# Patient Record
Sex: FEMALE | Race: WHITE | NOT HISPANIC OR LATINO | Employment: OTHER | URBAN - METROPOLITAN AREA
[De-identification: names, ages, dates, MRNs, and addresses within clinical notes are randomized per-mention and may not be internally consistent; named-entity substitution may affect disease eponyms.]

---

## 2017-01-13 RX ORDER — ATORVASTATIN CALCIUM 10 MG/1
TABLET, FILM COATED ORAL
Qty: 90 TABLET | Refills: 1 | Status: SHIPPED | OUTPATIENT
Start: 2017-01-13 | End: 2017-07-08 | Stop reason: SDUPTHER

## 2017-05-17 DIAGNOSIS — E03.9 ACQUIRED HYPOTHYROIDISM: ICD-10-CM

## 2017-05-17 RX ORDER — LEVOTHYROXINE SODIUM 0.07 MG/1
TABLET ORAL
Qty: 90 TABLET | Refills: 3 | Status: SHIPPED | OUTPATIENT
Start: 2017-05-17

## 2017-06-06 RX ORDER — ATORVASTATIN CALCIUM 10 MG/1
TABLET, FILM COATED ORAL
Qty: 90 TABLET | Refills: 1 | OUTPATIENT
Start: 2017-06-06

## 2017-06-27 ENCOUNTER — OFFICE VISIT (OUTPATIENT)
Dept: INTERNAL MEDICINE | Facility: CLINIC | Age: 77
End: 2017-06-27

## 2017-06-27 VITALS
RESPIRATION RATE: 16 BRPM | HEART RATE: 92 BPM | WEIGHT: 114 LBS | SYSTOLIC BLOOD PRESSURE: 140 MMHG | HEIGHT: 62 IN | BODY MASS INDEX: 20.98 KG/M2 | TEMPERATURE: 98.1 F | DIASTOLIC BLOOD PRESSURE: 72 MMHG

## 2017-06-27 DIAGNOSIS — I10 ESSENTIAL HYPERTENSION: ICD-10-CM

## 2017-06-27 DIAGNOSIS — Z79.899 DRUG THERAPY: ICD-10-CM

## 2017-06-27 DIAGNOSIS — K21.9 GASTROESOPHAGEAL REFLUX DISEASE WITHOUT ESOPHAGITIS: Primary | ICD-10-CM

## 2017-06-27 DIAGNOSIS — M81.0 OSTEOPOROSIS: ICD-10-CM

## 2017-06-27 DIAGNOSIS — E03.9 ACQUIRED HYPOTHYROIDISM: ICD-10-CM

## 2017-06-27 DIAGNOSIS — E78.2 MIXED HYPERLIPIDEMIA: ICD-10-CM

## 2017-06-27 LAB
ALBUMIN SERPL-MCNC: 4.5 G/DL (ref 3.2–4.8)
ALBUMIN/GLOB SERPL: 1.8 G/DL (ref 1.5–2.5)
ALP SERPL-CCNC: 83 U/L (ref 25–100)
ALT SERPL W P-5'-P-CCNC: 19 U/L (ref 7–40)
ANION GAP SERPL CALCULATED.3IONS-SCNC: 16 MMOL/L (ref 3–11)
ARTICHOKE IGE QN: 95 MG/DL (ref 0–130)
AST SERPL-CCNC: 28 U/L (ref 0–33)
BILIRUB SERPL-MCNC: 0.4 MG/DL (ref 0.3–1.2)
BUN BLD-MCNC: 13 MG/DL (ref 9–23)
BUN/CREAT SERPL: 16.3 (ref 7–25)
CALCIUM SPEC-SCNC: 10.3 MG/DL (ref 8.7–10.4)
CHLORIDE SERPL-SCNC: 107 MMOL/L (ref 99–109)
CHOLEST SERPL-MCNC: 179 MG/DL (ref 0–200)
CO2 SERPL-SCNC: 19 MMOL/L (ref 20–31)
CREAT BLD-MCNC: 0.8 MG/DL (ref 0.6–1.3)
DEPRECATED RDW RBC AUTO: 47 FL (ref 37–54)
ERYTHROCYTE [DISTWIDTH] IN BLOOD BY AUTOMATED COUNT: 13 % (ref 11.3–14.5)
GFR SERPL CREATININE-BSD FRML MDRD: 70 ML/MIN/1.73
GLOBULIN UR ELPH-MCNC: 2.5 GM/DL
GLUCOSE BLD-MCNC: 66 MG/DL (ref 70–100)
HCT VFR BLD AUTO: 39.1 % (ref 34.5–44)
HDLC SERPL-MCNC: 58 MG/DL (ref 40–60)
HGB BLD-MCNC: 12.9 G/DL (ref 11.5–15.5)
MCH RBC QN AUTO: 33.1 PG (ref 27–31)
MCHC RBC AUTO-ENTMCNC: 33 G/DL (ref 32–36)
MCV RBC AUTO: 100.3 FL (ref 80–99)
PLATELET # BLD AUTO: 299 10*3/MM3 (ref 150–450)
PMV BLD AUTO: 12.3 FL (ref 6–12)
POTASSIUM BLD-SCNC: 4.6 MMOL/L (ref 3.5–5.5)
PROT SERPL-MCNC: 7 G/DL (ref 5.7–8.2)
RBC # BLD AUTO: 3.9 10*6/MM3 (ref 3.89–5.14)
SODIUM BLD-SCNC: 142 MMOL/L (ref 132–146)
T4 FREE SERPL-MCNC: 1.43 NG/DL (ref 0.89–1.76)
TRIGL SERPL-MCNC: 113 MG/DL (ref 0–150)
TSH SERPL DL<=0.05 MIU/L-ACNC: 0.19 MIU/ML (ref 0.35–5.35)
WBC NRBC COR # BLD: 8.19 10*3/MM3 (ref 3.5–10.8)

## 2017-06-27 PROCEDURE — 80061 LIPID PANEL: CPT | Performed by: INTERNAL MEDICINE

## 2017-06-27 PROCEDURE — 36415 COLL VENOUS BLD VENIPUNCTURE: CPT | Performed by: INTERNAL MEDICINE

## 2017-06-27 PROCEDURE — 80053 COMPREHEN METABOLIC PANEL: CPT | Performed by: INTERNAL MEDICINE

## 2017-06-27 PROCEDURE — 99214 OFFICE O/P EST MOD 30 MIN: CPT | Performed by: INTERNAL MEDICINE

## 2017-06-27 PROCEDURE — 85027 COMPLETE CBC AUTOMATED: CPT | Performed by: INTERNAL MEDICINE

## 2017-06-27 PROCEDURE — 84443 ASSAY THYROID STIM HORMONE: CPT | Performed by: INTERNAL MEDICINE

## 2017-06-27 PROCEDURE — 84439 ASSAY OF FREE THYROXINE: CPT | Performed by: INTERNAL MEDICINE

## 2017-06-27 NOTE — PROGRESS NOTES
Subjective   Aura Fischer is a 77 y.o. female.     History of Present Illness   For follow up of  HTN on meds.  She has some sinus headaches but are about the same.  Hypothyroid on replacement.  Hyperlipidemia on meds no muscle aches.  Osteoporosis on meds.  She has been taking for 2 years, has had a bone density last year via GYN.      The following portions of the patient's history were reviewed and updated as appropriate: allergies, current medications, past family history, past medical history, past social history, past surgical history and problem list.    Review of Systems   Constitutional: Negative.  Negative for appetite change, fatigue and fever.   HENT: Positive for ear pain (mild left canal pain for several days.). Negative for congestion, hearing loss, rhinorrhea, sinus pressure, sore throat, tinnitus and trouble swallowing.    Eyes: Negative.  Negative for visual disturbance.   Respiratory: Negative.  Negative for cough, chest tightness, shortness of breath and wheezing.    Cardiovascular: Negative.  Negative for chest pain, palpitations and leg swelling.   Gastrointestinal: Negative.  Negative for abdominal distention, abdominal pain, blood in stool, constipation, diarrhea, nausea and vomiting.   Endocrine: Negative.  Negative for polydipsia, polyphagia and polyuria.   Genitourinary: Negative.  Negative for difficulty urinating, dysuria, flank pain, frequency, menstrual problem, pelvic pain, vaginal bleeding and vaginal discharge.   Musculoskeletal: Negative.  Negative for arthralgias, back pain, gait problem, joint swelling and neck pain.   Skin: Negative for rash.   Allergic/Immunologic: Negative.  Negative for environmental allergies.   Neurological: Positive for headaches. Negative for dizziness, tremors, seizures, weakness and numbness.   Hematological: Negative.  Negative for adenopathy.   Psychiatric/Behavioral: Negative.  Negative for agitation, confusion, dysphoric mood and sleep  disturbance. The patient is not nervous/anxious.        Objective   Physical Exam   Constitutional: She is oriented to person, place, and time. She appears well-developed and well-nourished.   HENT:   Head: Normocephalic and atraumatic.   Right Ear: External ear normal.   Left Ear: External ear normal.   Nose: Nose normal.   Mouth/Throat: Oropharynx is clear and moist.   Ear looks normal on left maybe some slight flakiness in canal.   Eyes: Conjunctivae and EOM are normal. Pupils are equal, round, and reactive to light.   Fundoscopic exam:       The right eye shows no AV nicking and no hemorrhage.        The left eye shows no AV nicking and no hemorrhage.   Neck: Normal range of motion. Neck supple. No thyromegaly present.   Cardiovascular: Normal rate, regular rhythm, normal heart sounds and intact distal pulses.  Exam reveals no gallop and no friction rub.    No murmur heard.  Carotids normal.   Pulmonary/Chest: Effort normal and breath sounds normal. No respiratory distress. She has no wheezes. She has no rales. She exhibits no tenderness. Right breast exhibits no inverted nipple, no mass, no skin change and no tenderness. Left breast exhibits no inverted nipple, no mass, no skin change and no tenderness. Breasts are symmetrical.   Abdominal: Soft. Bowel sounds are normal. She exhibits no distension and no mass. There is no tenderness.   Musculoskeletal: Normal range of motion.   Lymphadenopathy:     She has no cervical adenopathy.   Neurological: She is alert and oriented to person, place, and time. She has normal reflexes. No cranial nerve deficit.   Skin: Skin is warm and dry.   Psychiatric: She has a normal mood and affect. Her behavior is normal. Judgment and thought content normal.   Nursing note and vitals reviewed.      Assessment/Plan   Aura was seen today for follow-up.    Diagnoses and all orders for this visit:    Gastroesophageal reflux disease without esophagitis    Acquired hypothyroidism  -      T4, Free  -     TSH    Osteoporosis    Mixed hyperlipidemia  -     Comprehensive Metabolic Panel  -     Lipid Panel    Essential hypertension    Drug therapy  -     CBC (No Diff)    Will try otc cortaid on q tip for ear.  All problems are stable  Labs as noted.  Same meds.  Recheck 6 months.

## 2017-07-10 RX ORDER — TRANDOLAPRIL TABLETS 2 MG/1
TABLET ORAL
Qty: 90 TABLET | Refills: 5 | Status: SHIPPED | OUTPATIENT
Start: 2017-07-10 | End: 2019-11-19 | Stop reason: HOSPADM

## 2017-07-10 RX ORDER — ATORVASTATIN CALCIUM 10 MG/1
TABLET, FILM COATED ORAL
Qty: 90 TABLET | Refills: 1 | Status: SHIPPED | OUTPATIENT
Start: 2017-07-10 | End: 2019-11-19 | Stop reason: HOSPADM

## 2019-11-13 ENCOUNTER — HOSPITAL ENCOUNTER (INPATIENT)
Facility: HOSPITAL | Age: 79
LOS: 6 days | Discharge: NURSING, REHAB OR LONG-TERM CARE | End: 2019-11-19
Attending: FAMILY MEDICINE | Admitting: INTERNAL MEDICINE

## 2019-11-13 ENCOUNTER — APPOINTMENT (OUTPATIENT)
Dept: GENERAL RADIOLOGY | Facility: HOSPITAL | Age: 79
End: 2019-11-13

## 2019-11-13 ENCOUNTER — APPOINTMENT (OUTPATIENT)
Dept: CT IMAGING | Facility: HOSPITAL | Age: 79
End: 2019-11-13

## 2019-11-13 ENCOUNTER — APPOINTMENT (OUTPATIENT)
Dept: MRI IMAGING | Facility: HOSPITAL | Age: 79
End: 2019-11-13

## 2019-11-13 ENCOUNTER — APPOINTMENT (OUTPATIENT)
Dept: NEUROLOGY | Facility: HOSPITAL | Age: 79
End: 2019-11-13

## 2019-11-13 ENCOUNTER — APPOINTMENT (OUTPATIENT)
Dept: CARDIOLOGY | Facility: HOSPITAL | Age: 79
End: 2019-11-13

## 2019-11-13 DIAGNOSIS — R41.841 COGNITIVE COMMUNICATION DEFICIT: ICD-10-CM

## 2019-11-13 DIAGNOSIS — Z74.09 IMPAIRED MOBILITY AND ADLS: ICD-10-CM

## 2019-11-13 DIAGNOSIS — G93.41 ACUTE METABOLIC ENCEPHALOPATHY: Primary | ICD-10-CM

## 2019-11-13 DIAGNOSIS — Z78.9 IMPAIRED MOBILITY AND ADLS: ICD-10-CM

## 2019-11-13 PROBLEM — D84.9 IMMUNOSUPPRESSED STATUS (HCC): Status: ACTIVE | Noted: 2019-11-13

## 2019-11-13 PROBLEM — N39.0 ACUTE UTI (URINARY TRACT INFECTION): Status: ACTIVE | Noted: 2019-11-13

## 2019-11-13 PROBLEM — G93.40 ENCEPHALOPATHY: Status: ACTIVE | Noted: 2019-11-13

## 2019-11-13 PROBLEM — A41.9 SEPSIS, UNSPECIFIED ORGANISM (HCC): Status: ACTIVE | Noted: 2019-11-13

## 2019-11-13 PROBLEM — E87.20 LACTIC ACIDOSIS: Status: ACTIVE | Noted: 2019-11-13

## 2019-11-13 LAB
ALBUMIN SERPL-MCNC: 4.2 G/DL (ref 3.5–5.2)
ALBUMIN/GLOB SERPL: 1.4 G/DL
ALP SERPL-CCNC: 106 U/L (ref 39–117)
ALT SERPL W P-5'-P-CCNC: 31 U/L (ref 1–33)
ANION GAP SERPL CALCULATED.3IONS-SCNC: 16 MMOL/L (ref 5–15)
APPEARANCE CSF: CLEAR
AST SERPL-CCNC: 29 U/L (ref 1–32)
BACTERIA UR QL AUTO: ABNORMAL /HPF
BH CV ECHO MEAS - AO ROOT AREA (BSA CORRECTED): 1.6
BH CV ECHO MEAS - AO ROOT AREA: 4.8 CM^2
BH CV ECHO MEAS - AO ROOT DIAM: 2.5 CM
BH CV ECHO MEAS - BSA(HAYCOCK): 1.6 M^2
BH CV ECHO MEAS - BSA: 1.6 M^2
BH CV ECHO MEAS - BZI_BMI: 23 KILOGRAMS/M^2
BH CV ECHO MEAS - BZI_METRIC_HEIGHT: 157.5 CM
BH CV ECHO MEAS - BZI_METRIC_WEIGHT: 57.2 KG
BH CV ECHO MEAS - EDV(CUBED): 86.6 ML
BH CV ECHO MEAS - EDV(TEICH): 88.8 ML
BH CV ECHO MEAS - EF(CUBED): 80.6 %
BH CV ECHO MEAS - EF(TEICH): 73.3 %
BH CV ECHO MEAS - ESV(CUBED): 16.8 ML
BH CV ECHO MEAS - ESV(TEICH): 23.7 ML
BH CV ECHO MEAS - FS: 42.1 %
BH CV ECHO MEAS - IVS/LVPW: 0.94
BH CV ECHO MEAS - IVSD: 0.91 CM
BH CV ECHO MEAS - LA DIMENSION: 4 CM
BH CV ECHO MEAS - LA/AO: 1.6
BH CV ECHO MEAS - LAD MAJOR: 5.1 CM
BH CV ECHO MEAS - LAT PEAK E' VEL: 6 CM/SEC
BH CV ECHO MEAS - LATERAL E/E' RATIO: 16.9
BH CV ECHO MEAS - LV MASS(C)D: 136.6 GRAMS
BH CV ECHO MEAS - LV MASS(C)DI: 86.9 GRAMS/M^2
BH CV ECHO MEAS - LVIDD: 4.4 CM
BH CV ECHO MEAS - LVIDS: 2.6 CM
BH CV ECHO MEAS - LVPWD: 0.97 CM
BH CV ECHO MEAS - MED PEAK E' VEL: 5.3 CM/SEC
BH CV ECHO MEAS - MEDIAL E/E' RATIO: 19.4
BH CV ECHO MEAS - MV A MAX VEL: 124.1 CM/SEC
BH CV ECHO MEAS - MV DEC SLOPE: 325 CM/SEC^2
BH CV ECHO MEAS - MV DEC TIME: 0.22 SEC
BH CV ECHO MEAS - MV E MAX VEL: 104.9 CM/SEC
BH CV ECHO MEAS - MV E/A: 0.85
BH CV ECHO MEAS - MV P1/2T MAX VEL: 84.2 CM/SEC
BH CV ECHO MEAS - MV P1/2T: 75.8 MSEC
BH CV ECHO MEAS - MVA P1/2T LCG: 2.6 CM^2
BH CV ECHO MEAS - MVA(P1/2T): 2.9 CM^2
BH CV ECHO MEAS - PA ACC SLOPE: 905.5 CM/SEC^2
BH CV ECHO MEAS - PA ACC TIME: 0.13 SEC
BH CV ECHO MEAS - PA PR(ACCEL): 20.4 MMHG
BH CV ECHO MEAS - RAP SYSTOLE: 3 MMHG
BH CV ECHO MEAS - RV MAX PG: 5.7 MMHG
BH CV ECHO MEAS - RV V1 MAX: 119.4 CM/SEC
BH CV ECHO MEAS - RVSP: 27 MMHG
BH CV ECHO MEAS - SI(CUBED): 44.4 ML/M^2
BH CV ECHO MEAS - SI(TEICH): 41.4 ML/M^2
BH CV ECHO MEAS - SV(CUBED): 69.8 ML
BH CV ECHO MEAS - SV(TEICH): 65.1 ML
BH CV ECHO MEAS - TAPSE (>1.6): 1.6 CM2
BH CV ECHO MEAS - TR MAX PG: 24 MMHG
BH CV ECHO MEAS - TR MAX VEL: 244.3 CM/SEC
BH CV ECHO MEASUREMENTS AVERAGE E/E' RATIO: 18.57
BH CV XLRA - RV BASE: 3.4 CM
BH CV XLRA - RV LENGTH: 5.7 CM
BH CV XLRA - RV MID: 2.9 CM
BH CV XLRA - TDI S': 13 CM/SEC
BILIRUB SERPL-MCNC: 0.7 MG/DL (ref 0.2–1.2)
BILIRUB UR QL STRIP: NEGATIVE
BUN BLD-MCNC: 15 MG/DL (ref 8–23)
BUN/CREAT SERPL: 19 (ref 7–25)
C GATTII+NEOFOR DNA CSF QL NAA+NON-PROBE: NOT DETECTED
CALCIUM SPEC-SCNC: 8.7 MG/DL (ref 8.6–10.5)
CHLORIDE SERPL-SCNC: 99 MMOL/L (ref 98–107)
CHOLEST SERPL-MCNC: 139 MG/DL (ref 0–200)
CLARITY UR: ABNORMAL
CMV DNA CSF QL NAA+PROBE: NOT DETECTED
CO2 SERPL-SCNC: 22 MMOL/L (ref 22–29)
COLOR CSF: COLORLESS
COLOR SPUN CSF: COLORLESS
COLOR UR: ABNORMAL
CREAT BLD-MCNC: 0.79 MG/DL (ref 0.57–1)
D-LACTATE SERPL-SCNC: 1.7 MMOL/L (ref 0.5–2)
DEPRECATED RDW RBC AUTO: 47.3 FL (ref 37–54)
E COLI K1 DNA CSF QL NAA+NON-PROBE: NOT DETECTED
ERYTHROCYTE [DISTWIDTH] IN BLOOD BY AUTOMATED COUNT: 13.2 % (ref 12.3–15.4)
EV RNA CSF QL NAA+PROBE: NOT DETECTED
FLUAV SUBTYP SPEC NAA+PROBE: NOT DETECTED
FLUBV RNA ISLT QL NAA+PROBE: NOT DETECTED
GFR SERPL CREATININE-BSD FRML MDRD: 70 ML/MIN/1.73
GLOBULIN UR ELPH-MCNC: 3 GM/DL
GLUCOSE BLD-MCNC: 184 MG/DL (ref 65–99)
GLUCOSE BLDC GLUCOMTR-MCNC: 107 MG/DL (ref 70–130)
GLUCOSE BLDC GLUCOMTR-MCNC: 172 MG/DL (ref 70–130)
GLUCOSE BLDC GLUCOMTR-MCNC: 178 MG/DL (ref 70–130)
GLUCOSE BLDC GLUCOMTR-MCNC: 186 MG/DL (ref 70–130)
GLUCOSE BLDC GLUCOMTR-MCNC: 197 MG/DL (ref 70–130)
GLUCOSE CSF-MCNC: 92 MG/DL (ref 40–70)
GLUCOSE UR STRIP-MCNC: NEGATIVE MG/DL
GP B STREP DNA SPEC QL NAA+PROBE: NOT DETECTED
HAEM INFLU SEROTYP DNA SPEC NAA+PROBE: NOT DETECTED
HBA1C MFR BLD: 5.9 % (ref 4.8–5.6)
HCT VFR BLD AUTO: 38.3 % (ref 34–46.6)
HDLC SERPL-MCNC: 58 MG/DL (ref 40–60)
HGB BLD-MCNC: 12.4 G/DL (ref 12–15.9)
HGB UR QL STRIP.AUTO: ABNORMAL
HHV6 DNA CSF QL NAA+PROBE: NOT DETECTED
HSV1 DNA CSF QL NAA+PROBE: NOT DETECTED
HSV2 DNA CSF QL NAA+PROBE: NOT DETECTED
HYALINE CASTS UR QL AUTO: ABNORMAL /LPF
KETONES UR QL STRIP: ABNORMAL
L MONOCYTOG RRNA SPEC QL PROBE: NOT DETECTED
LDLC SERPL CALC-MCNC: 67 MG/DL (ref 0–100)
LDLC/HDLC SERPL: 1.15 {RATIO}
LEFT ATRIUM VOLUME INDEX: 29.9 ML/M^2
LEFT ATRIUM VOLUME: 47 ML
LEUKOCYTE ESTERASE UR QL STRIP.AUTO: ABNORMAL
LYMPHOCYTES NFR CSF MANUAL: 3 % (ref 62–100)
MCH RBC QN AUTO: 31.4 PG (ref 26.6–33)
MCHC RBC AUTO-ENTMCNC: 32.4 G/DL (ref 31.5–35.7)
MCV RBC AUTO: 97 FL (ref 79–97)
MONOCYTES NFR CSF MANUAL: 3 % (ref 0–36)
N MEN DNA SPEC QL NAA+PROBE: NOT DETECTED
NEUTROPHILS NFR CSF MICRO: 94 % (ref 0–6)
NITRITE UR QL STRIP: NEGATIVE
PARECHOVIRUS A RNA CSF QL NAA+NON-PROBE: NOT DETECTED
PH UR STRIP.AUTO: 6.5 [PH] (ref 5–8)
PLATELET # BLD AUTO: 293 10*3/MM3 (ref 140–450)
PMV BLD AUTO: 12.5 FL (ref 6–12)
POTASSIUM BLD-SCNC: 3.7 MMOL/L (ref 3.5–5.2)
PROT CSF-MCNC: 43.4 MG/DL (ref 15–45)
PROT SERPL-MCNC: 7.2 G/DL (ref 6–8.5)
PROT UR QL STRIP: ABNORMAL
RBC # BLD AUTO: 3.95 10*6/MM3 (ref 3.77–5.28)
RBC # CSF MANUAL: 27 /MM3 (ref 0–5)
RBC # UR: ABNORMAL /HPF
REF LAB TEST METHOD: ABNORMAL
S PNEUM DNA CSF QL NAA+NON-PROBE: NOT DETECTED
SODIUM BLD-SCNC: 137 MMOL/L (ref 136–145)
SP GR UR STRIP: 1.01 (ref 1–1.03)
SPECIMEN VOL CSF: 8 ML
SQUAMOUS #/AREA URNS HPF: ABNORMAL /HPF
TRIGL SERPL-MCNC: 72 MG/DL (ref 0–150)
TROPONIN T SERPL-MCNC: <0.01 NG/ML (ref 0–0.03)
TSH SERPL DL<=0.05 MIU/L-ACNC: 0.51 UIU/ML (ref 0.27–4.2)
TUBE # CSF: 4
UROBILINOGEN UR QL STRIP: ABNORMAL
VLDLC SERPL-MCNC: 14.4 MG/DL
VZV DNA CSF QL NAA+PROBE: NOT DETECTED
WBC # CSF MANUAL: 49 /MM3 (ref 0–5)
WBC NRBC COR # BLD: 12.97 10*3/MM3 (ref 3.4–10.8)
WBC UR QL AUTO: ABNORMAL /HPF

## 2019-11-13 PROCEDURE — 25010000002 ONDANSETRON PER 1 MG: Performed by: NURSE PRACTITIONER

## 2019-11-13 PROCEDURE — 009U3ZX DRAINAGE OF SPINAL CANAL, PERCUTANEOUS APPROACH, DIAGNOSTIC: ICD-10-PCS | Performed by: PHYSICIAN ASSISTANT

## 2019-11-13 PROCEDURE — 93306 TTE W/DOPPLER COMPLETE: CPT

## 2019-11-13 PROCEDURE — B01B1ZZ FLUOROSCOPY OF SPINAL CORD USING LOW OSMOLAR CONTRAST: ICD-10-PCS | Performed by: PHYSICIAN ASSISTANT

## 2019-11-13 PROCEDURE — 82945 GLUCOSE OTHER FLUID: CPT | Performed by: INTERNAL MEDICINE

## 2019-11-13 PROCEDURE — 83605 ASSAY OF LACTIC ACID: CPT | Performed by: INTERNAL MEDICINE

## 2019-11-13 PROCEDURE — 25010000002 CEFEPIME PER 500 MG: Performed by: INTERNAL MEDICINE

## 2019-11-13 PROCEDURE — 87483 CNS DNA AMP PROBE TYPE 12-25: CPT | Performed by: INTERNAL MEDICINE

## 2019-11-13 PROCEDURE — 99223 1ST HOSP IP/OBS HIGH 75: CPT | Performed by: PSYCHIATRY & NEUROLOGY

## 2019-11-13 PROCEDURE — 87070 CULTURE OTHR SPECIMN AEROBIC: CPT | Performed by: INTERNAL MEDICINE

## 2019-11-13 PROCEDURE — 25010000002 VANCOMYCIN 10 G RECONSTITUTED SOLUTION

## 2019-11-13 PROCEDURE — 87015 SPECIMEN INFECT AGNT CONCNTJ: CPT | Performed by: INTERNAL MEDICINE

## 2019-11-13 PROCEDURE — 0042T HC CT CEREBRAL PERFUSION W/WO CONTRAST: CPT

## 2019-11-13 PROCEDURE — 93306 TTE W/DOPPLER COMPLETE: CPT | Performed by: INTERNAL MEDICINE

## 2019-11-13 PROCEDURE — 89051 BODY FLUID CELL COUNT: CPT | Performed by: INTERNAL MEDICINE

## 2019-11-13 PROCEDURE — 84443 ASSAY THYROID STIM HORMONE: CPT | Performed by: INTERNAL MEDICINE

## 2019-11-13 PROCEDURE — 80053 COMPREHEN METABOLIC PANEL: CPT | Performed by: INTERNAL MEDICINE

## 2019-11-13 PROCEDURE — 0 IOPAMIDOL PER 1 ML: Performed by: INTERNAL MEDICINE

## 2019-11-13 PROCEDURE — 92523 SPEECH SOUND LANG COMPREHEN: CPT

## 2019-11-13 PROCEDURE — 93010 ELECTROCARDIOGRAM REPORT: CPT | Performed by: INTERNAL MEDICINE

## 2019-11-13 PROCEDURE — 87205 SMEAR GRAM STAIN: CPT | Performed by: INTERNAL MEDICINE

## 2019-11-13 PROCEDURE — 84484 ASSAY OF TROPONIN QUANT: CPT | Performed by: INTERNAL MEDICINE

## 2019-11-13 PROCEDURE — 80061 LIPID PANEL: CPT | Performed by: INTERNAL MEDICINE

## 2019-11-13 PROCEDURE — 71045 X-RAY EXAM CHEST 1 VIEW: CPT

## 2019-11-13 PROCEDURE — 99291 CRITICAL CARE FIRST HOUR: CPT | Performed by: INTERNAL MEDICINE

## 2019-11-13 PROCEDURE — 85027 COMPLETE CBC AUTOMATED: CPT | Performed by: INTERNAL MEDICINE

## 2019-11-13 PROCEDURE — 63710000001 INSULIN LISPRO (HUMAN) PER 5 UNITS: Performed by: INTERNAL MEDICINE

## 2019-11-13 PROCEDURE — 82962 GLUCOSE BLOOD TEST: CPT

## 2019-11-13 PROCEDURE — 83036 HEMOGLOBIN GLYCOSYLATED A1C: CPT | Performed by: INTERNAL MEDICINE

## 2019-11-13 PROCEDURE — 87040 BLOOD CULTURE FOR BACTERIA: CPT | Performed by: INTERNAL MEDICINE

## 2019-11-13 PROCEDURE — 25010000002 ACYCLOVIR PER 5 MG: Performed by: INTERNAL MEDICINE

## 2019-11-13 PROCEDURE — 70498 CT ANGIOGRAPHY NECK: CPT

## 2019-11-13 PROCEDURE — 25010000002 VANCOMYCIN PER 500 MG

## 2019-11-13 PROCEDURE — 87502 INFLUENZA DNA AMP PROBE: CPT | Performed by: INTERNAL MEDICINE

## 2019-11-13 PROCEDURE — 77003 FLUOROGUIDE FOR SPINE INJECT: CPT

## 2019-11-13 PROCEDURE — 87086 URINE CULTURE/COLONY COUNT: CPT | Performed by: INTERNAL MEDICINE

## 2019-11-13 PROCEDURE — 70496 CT ANGIOGRAPHY HEAD: CPT

## 2019-11-13 PROCEDURE — 93005 ELECTROCARDIOGRAM TRACING: CPT | Performed by: INTERNAL MEDICINE

## 2019-11-13 PROCEDURE — 81001 URINALYSIS AUTO W/SCOPE: CPT | Performed by: INTERNAL MEDICINE

## 2019-11-13 PROCEDURE — 84157 ASSAY OF PROTEIN OTHER: CPT | Performed by: INTERNAL MEDICINE

## 2019-11-13 PROCEDURE — 70551 MRI BRAIN STEM W/O DYE: CPT

## 2019-11-13 PROCEDURE — 95819 EEG AWAKE AND ASLEEP: CPT

## 2019-11-13 PROCEDURE — 25010000002 AMPICILLIN PER 500 MG: Performed by: INTERNAL MEDICINE

## 2019-11-13 RX ORDER — NICOTINE POLACRILEX 4 MG
15 LOZENGE BUCCAL
Status: DISCONTINUED | OUTPATIENT
Start: 2019-11-13 | End: 2019-11-19 | Stop reason: HOSPADM

## 2019-11-13 RX ORDER — SODIUM CHLORIDE 9 MG/ML
100 INJECTION, SOLUTION INTRAVENOUS CONTINUOUS
Status: DISCONTINUED | OUTPATIENT
Start: 2019-11-13 | End: 2019-11-17

## 2019-11-13 RX ORDER — ASPIRIN 81 MG/1
81 TABLET, CHEWABLE ORAL DAILY
Status: DISCONTINUED | OUTPATIENT
Start: 2019-11-13 | End: 2019-11-19 | Stop reason: HOSPADM

## 2019-11-13 RX ORDER — SODIUM CHLORIDE 0.9 % (FLUSH) 0.9 %
10 SYRINGE (ML) INJECTION EVERY 12 HOURS SCHEDULED
Status: DISCONTINUED | OUTPATIENT
Start: 2019-11-13 | End: 2019-11-19 | Stop reason: HOSPADM

## 2019-11-13 RX ORDER — ATORVASTATIN CALCIUM 40 MG/1
80 TABLET, FILM COATED ORAL NIGHTLY
Status: DISCONTINUED | OUTPATIENT
Start: 2019-11-13 | End: 2019-11-19 | Stop reason: HOSPADM

## 2019-11-13 RX ORDER — LIDOCAINE HYDROCHLORIDE 10 MG/ML
5 INJECTION, SOLUTION INFILTRATION; PERINEURAL ONCE
Status: DISCONTINUED | OUTPATIENT
Start: 2019-11-13 | End: 2019-11-19 | Stop reason: HOSPADM

## 2019-11-13 RX ORDER — DEXTROSE MONOHYDRATE 25 G/50ML
25 INJECTION, SOLUTION INTRAVENOUS
Status: DISCONTINUED | OUTPATIENT
Start: 2019-11-13 | End: 2019-11-19 | Stop reason: HOSPADM

## 2019-11-13 RX ORDER — ONDANSETRON 2 MG/ML
4 INJECTION INTRAMUSCULAR; INTRAVENOUS EVERY 6 HOURS PRN
Status: DISCONTINUED | OUTPATIENT
Start: 2019-11-13 | End: 2019-11-19 | Stop reason: HOSPADM

## 2019-11-13 RX ORDER — ONDANSETRON 4 MG/1
4 TABLET, FILM COATED ORAL ONCE AS NEEDED
Status: DISCONTINUED | OUTPATIENT
Start: 2019-11-13 | End: 2019-11-19 | Stop reason: HOSPADM

## 2019-11-13 RX ORDER — SODIUM CHLORIDE 0.9 % (FLUSH) 0.9 %
10 SYRINGE (ML) INJECTION AS NEEDED
Status: DISCONTINUED | OUTPATIENT
Start: 2019-11-13 | End: 2019-11-19 | Stop reason: HOSPADM

## 2019-11-13 RX ORDER — LEVOTHYROXINE SODIUM 0.07 MG/1
75 TABLET ORAL DAILY
Status: DISCONTINUED | OUTPATIENT
Start: 2019-11-13 | End: 2019-11-19 | Stop reason: HOSPADM

## 2019-11-13 RX ORDER — ACETAMINOPHEN 325 MG/1
650 TABLET ORAL EVERY 4 HOURS PRN
Status: DISCONTINUED | OUTPATIENT
Start: 2019-11-13 | End: 2019-11-19 | Stop reason: HOSPADM

## 2019-11-13 RX ORDER — ACETAMINOPHEN 650 MG/1
650 SUPPOSITORY RECTAL EVERY 4 HOURS PRN
Status: DISCONTINUED | OUTPATIENT
Start: 2019-11-13 | End: 2019-11-19 | Stop reason: HOSPADM

## 2019-11-13 RX ADMIN — SODIUM CHLORIDE 100 ML/HR: 9 INJECTION, SOLUTION INTRAVENOUS at 07:17

## 2019-11-13 RX ADMIN — ACETAMINOPHEN 650 MG: 325 TABLET ORAL at 18:24

## 2019-11-13 RX ADMIN — INSULIN LISPRO 2 UNITS: 100 INJECTION, SOLUTION INTRAVENOUS; SUBCUTANEOUS at 09:42

## 2019-11-13 RX ADMIN — ONDANSETRON 4 MG: 2 INJECTION INTRAMUSCULAR; INTRAVENOUS at 04:59

## 2019-11-13 RX ADMIN — VANCOMYCIN HYDROCHLORIDE 1500 MG: 10 INJECTION, POWDER, LYOPHILIZED, FOR SOLUTION INTRAVENOUS at 08:18

## 2019-11-13 RX ADMIN — ACYCLOVIR SODIUM 500 MG: 50 INJECTION, SOLUTION INTRAVENOUS at 09:42

## 2019-11-13 RX ADMIN — INSULIN LISPRO 2 UNITS: 100 INJECTION, SOLUTION INTRAVENOUS; SUBCUTANEOUS at 22:37

## 2019-11-13 RX ADMIN — ACYCLOVIR SODIUM 500 MG: 50 INJECTION, SOLUTION INTRAVENOUS at 18:24

## 2019-11-13 RX ADMIN — CEFEPIME 2 G: 2 INJECTION, POWDER, FOR SOLUTION INTRAVENOUS at 07:34

## 2019-11-13 RX ADMIN — AMPICILLIN SODIUM 2 G: 2 INJECTION, POWDER, FOR SOLUTION INTRAMUSCULAR; INTRAVENOUS at 07:34

## 2019-11-13 RX ADMIN — INSULIN LISPRO 2 UNITS: 100 INJECTION, SOLUTION INTRAVENOUS; SUBCUTANEOUS at 11:54

## 2019-11-13 RX ADMIN — ASPIRIN 81 MG 81 MG: 81 TABLET ORAL at 08:58

## 2019-11-13 RX ADMIN — CEFEPIME 2 G: 2 INJECTION, POWDER, FOR SOLUTION INTRAVENOUS at 11:52

## 2019-11-13 RX ADMIN — AMPICILLIN SODIUM 2 G: 2 INJECTION, POWDER, FOR SOLUTION INTRAMUSCULAR; INTRAVENOUS at 22:09

## 2019-11-13 RX ADMIN — ACETAMINOPHEN 650 MG: 325 TABLET ORAL at 22:43

## 2019-11-13 RX ADMIN — AMPICILLIN SODIUM 2 G: 2 INJECTION, POWDER, FOR SOLUTION INTRAMUSCULAR; INTRAVENOUS at 16:47

## 2019-11-13 RX ADMIN — AMPICILLIN SODIUM 2 G: 2 INJECTION, POWDER, FOR SOLUTION INTRAMUSCULAR; INTRAVENOUS at 11:52

## 2019-11-13 RX ADMIN — IOPAMIDOL 115 ML: 755 INJECTION, SOLUTION INTRAVENOUS at 08:00

## 2019-11-13 RX ADMIN — VANCOMYCIN HYDROCHLORIDE 750 MG: 750 INJECTION, SOLUTION INTRAVENOUS at 22:57

## 2019-11-13 RX ADMIN — ATORVASTATIN CALCIUM 80 MG: 40 TABLET, FILM COATED ORAL at 22:06

## 2019-11-13 RX ADMIN — AMPICILLIN SODIUM 2 G: 2 INJECTION, POWDER, FOR SOLUTION INTRAMUSCULAR; INTRAVENOUS at 08:58

## 2019-11-13 RX ADMIN — ACETAMINOPHEN 650 MG: 325 TABLET ORAL at 10:09

## 2019-11-13 RX ADMIN — ONDANSETRON 4 MG: 2 INJECTION INTRAMUSCULAR; INTRAVENOUS at 22:53

## 2019-11-13 RX ADMIN — CEFEPIME 2 G: 2 INJECTION, POWDER, FOR SOLUTION INTRAVENOUS at 23:00

## 2019-11-13 RX ADMIN — SODIUM CHLORIDE, PRESERVATIVE FREE 10 ML: 5 INJECTION INTRAVENOUS at 22:38

## 2019-11-13 NOTE — H&P
[facility] Medicine Services  HISTORY AND PHYSICAL    Patient Name: Aura Fischer  : 1940  MRN: [id number]  Primary Care Physician: Jose Manuel Cabrales MD  Date of admission: 2019      Subjective   Subjective     Chief Complaint:  confusion    HPI:  Aura Fischer is a 79 y.o. female who was transferred here from [place] for confusion was last known well on  pm and was found down in home  evening.  Son states he had spoken w/ her the night before and that she was doing well and was in her normal state of health living alone and very independent.  Pt was found unresponsive in bed w/ fecal incontinence.  Per son, pt has not improved at all since that time and has garbled speech and stares off.  Reportedly pt was able to tell nurse her name and answer simple questions but not always correctly.  Fevers started today as far as son knows.  No prior hx of anything like this.      Review of Systems    unable to obtain    All other systems reviewed and are negative.     Personal History     Past Medical History:   Diagnosis Date   • Anemia    • Diverticulosis    • Hyperlipidemia    • Hypertension    • Internal hemorrhoid    • Mood disorder (CMS/HCC)    • Thyroid disease        Past Surgical History:   Procedure Laterality Date   • CHOLECYSTECTOMY     • HEMORRHOIDECTOMY     • PANCREATECTOMY     • SPLENECTOMY         Family History: family history is not on file. Otherwise pertinent FHx was reviewed and unremarkable.     Social History:  reports that she has never smoked. She does not have any smokeless tobacco history on file. She reports that she does not drink alcohol or use drugs.  Social History     Social History Narrative   • Not on file       Medications:  Available home medication information reviewed.  Medications Prior to Admission   Medication Sig Dispense Refill Last Dose   • alendronate (FOSAMAX) 70 MG tablet Take 1 tablet by mouth 1 (one) time per week.    Unknown at Unknown time   • atorvastatin (LIPITOR) 10 MG tablet TAKE 1 TABLET AT BEDTIME 90 tablet 1 Unknown at Unknown time   • FLUoxetine (PROzac) 10 MG capsule Take 1 capsule by mouth daily.   Unknown at Unknown time   • levothyroxine (SYNTHROID, LEVOTHROID) 75 MCG tablet TAKE 1 TABLET EVERY DAY (NEED MD APPOINTMENT) 90 tablet 3 Unknown at Unknown time   • promethazine (PHENERGAN) 25 MG tablet Take  by mouth.   Unknown at Unknown time   • trandolapril (MAVIK) 2 MG tablet TAKE 1 TABLET EVERY DAY 90 tablet 5 Unknown at Unknown time   • vitamin d (RA VITAMIN D-3) 5000 UNITS capsule Take 1 tablet by mouth daily.   Unknown at Unknown time       Allergies   Allergen Reactions   • Sulfa Antibiotics        Objective   Objective     Vital Signs:   Temp:  [100.4 °F (38 °C)-101.2 °F (38.4 °C)] 100.4 °F (38 °C)  Heart Rate:  [87] 87  Resp:  [16] 16  BP: (177)/(83) 177/83   Total (NIH Stroke Scale): 22    Physical Exam    gen; will open eyes to painful stimuli and gazes mostly to the left w/ very mumbled quiet speech  Heent; wandering eyes, pasty mm  Cv; rrr, no mrg  L; ctab, poor inspir effort  Abd; soft, +bs, ?suprapubic ttp  Ext; no cce, 2+ pulses  Skin; pale, cdi, warm  Neuro; pt able to  w/ left>right; keeps right arm contracted for most part; spontaneously moves ble but not purposeful.  No obvious facial .  Tends to gaze to left.  Psych; unable to asses    Results Reviewed:  I have personally reviewed current lab and radiology data.              Invalid input(s):  ALKPHOS  CrCl cannot be calculated (Patient's most recent lab result is older than the maximum 30 days allowed.).  Brief Urine Lab Results     None        Imaging Results (Last 24 Hours)     ** No results found for the last 24 hours. **             Assessment/Plan   Assessment / Plan     Active Hospital Problems    Diagnosis POA   • Encephalopathy [G93.40] Yes   • Acute UTI (urinary tract infection) [N39.0] Yes   • Sepsis, unspecified organism  (CMS/HCC) [A41.9] Yes   • Immunosuppressed status (CMS/HCC) [D89.9] Yes   • Lactic acidosis [E87.2] Yes   • Mixed hyperlipidemia [E78.2] Yes   • Essential hypertension [I10] Yes   • Acquired hypothyroidism [E03.9] Yes     78 y/o immunocompromised (splenectomy) female who was found down nearly 20 hours after last known well;  1. Encephalopathy;  -?infectious vs sz vs stroke  a. Sepsis;  -pt w/ fever, tachycardia, lactic acidosis, leukocytosis  -UA positive but not significant enough for her level of acuity so do not think this is primary culprit  -check blood cultures, cxr; may need LP pending remainder of eval and will place on vanc, cefepime, and ampicillin to cover for meningitis given immunocompromised w/ altered level of consciousness and fever.  b. ?stroke w/ nih16-->22 now  -pt w/ garbled speech and tends to gaze to left w/ ?rue contracted  -stat mri, stat cta h/n, stat ct perfusion  -neurology notified by osh  c. ?sz; pt found incontinent of feces  -plan eeg today; neurology notified prior to transfer  2. Lactic acidosis; ns and repeat.  Related to above likely but ?abd pain.  May  Need CT pending results of above  3. Hypothyroidism; check tsh; cont synthroid  4. Htn; permissive htn for now  5. Hyperlipidemia; high dose statin ordered; flp    DVT prophylaxis:  scd's    CODE STATUS:    Code Status and Medical Interventions:   Ordered at: 11/13/19 0315     Code Status:    CPR     Medical Interventions (Level of Support Prior to Arrest):    Full       Admission Status:  I believe this patient meets  INPATIENT status due to acute encephalopathy w/ sepsis and ?stroke.  I feel patient’s risk for adverse outcomes and need for care warrant INPATIENT evaluation and I predict the patient’s care encounter to likely last beyond 2 midnights.    Electronically signed by Adelita Lua MD, 11/13/19, 3:17 AM.  75 minutes of critical care provided. This time excludes other billable procedures. Time does include preparation of  documents, medical consultations, review of old records, and direct bedside care. Patient was at high risk for life-threatening deterioration due to acute encephalopathy of uncertain etiology w/ sepsis and ?stroke.

## 2019-11-13 NOTE — CONSULTS
Infectious Disease Consult  Aura Fischer  1940  [id number]    Date of Consult: 11/13/2019  Admission Date: 11/13/2019    Requesting Provider: Enoch Davis,*  Evaluating Physician: Jose Kee MD    Chief Complaint: confusion    Reason for Consultation: fever, confusion    History of present illness:   Patient is a 79 y.o.  Yr old female with history of HTN, HLD, thyroid disease.  She was taken to the emergency department and [place] after being found down by her family, nearly unresponsive.  She was last seen well about 20 hours prior and at that time complained of sinusitis and sore throat.  She had not taken any antibiotics.  She had been near her sister who is sick with similar symptoms.  Nobody with known influenza.  No recent travel.  No recent procedures, injections etc.  She was worked up initially in the [place] emergency department and transferred to [facility] on 11/13 for further work-up.  Febrile on arrival here up to 101.  Mental status is improved but she remains significantly confused.  She is oriented to name but answers all other questions including location and date with her son's name.  Son and daughter-in-law are at bedside who provided the history. Influenza PCR negative. WBC 12. UA pending. Started on empiric antibiotics for meningitis. Per family mental status is better in the past 24 hrs.     Review of Systems  Unable to obtain due to confusion.     Past Medical History:   Diagnosis Date   • Anemia    • Diverticulosis    • Hyperlipidemia    • Hypertension    • Internal hemorrhoid    • Mood disorder (CMS/HCC)    • Thyroid disease        Past Surgical History:   Procedure Laterality Date   • BREAST SURGERY     • CHOLECYSTECTOMY     • HEMORRHOIDECTOMY     • HYSTERECTOMY     • PANCREATECTOMY     • SPLENECTOMY         Social History     Socioeconomic History   • Marital status:      Spouse name: Not on file   • Number of children: Not on file   • Years of  education: Not on file   • Highest education level: Not on file   Tobacco Use   • Smoking status: Never Smoker   Substance and Sexual Activity   • Alcohol use: No   • Drug use: No     Family history reviewed: non-contributory    Allergies   Allergen Reactions   • Sulfa Antibiotics        Antibiotics:  Anti-Infectives (From admission, onward)    Ordered     Dose/Rate Route Frequency Start Stop    11/13/19 0702  vancomycin in dextrose 5% 150 mL (VANCOCIN) IVPB 750 mg     Ordering Provider:  Jose Lopez RPH    15 mg/kg × 57.4 kg  over 60 Minutes Intravenous Every 12 Hours 11/13/19 2100 11/16/19 0859    11/13/19 0315  cefepime (MAXIPIME) 2 g/100 mL 0.9% NS (mbp)     Ordering Provider:  Adelita Lua MD    2 g  over 4 Hours Intravenous Every 8 Hours 11/13/19 1200 11/16/19 1159    11/13/19 0815  acyclovir (ZOVIRAX) 500 mg in sodium chloride 0.9 % 100 mL IVPB     Ordering Provider:  Bruno Ocampo MD    10 mg/kg × 50.1 kg (Ideal)  over 60 Minutes Intravenous Once 11/13/19 0900      11/13/19 0612  vancomycin 1500 mg/500 mL 0.9% NS IVPB ([facility])     Ordering Provider:  Jose Lopez RPH    25 mg/kg × 57.4 kg  over 90 Minutes Intravenous Once 11/13/19 0700 11/13/19 0948    11/13/19 0315  cefepime (MAXIPIME) 2 g/100 mL 0.9% NS (mbp)     Ordering Provider:  Adelita Lua MD    2 g  200 mL/hr over 30 Minutes Intravenous Once 11/13/19 0415 11/13/19 0804    11/13/19 0315  ampicillin 2 g/100 mL 0.9% NS (MBP)     Ordering Provider:  Adelita Lua MD    2 g  over 30 Minutes Intravenous Every 4 Hours Scheduled 11/13/19 0330 11/16/19 0359    11/13/19 0315  Pharmacy to dose vancomycin     Ordering Provider:  Adelita Lua MD     Does not apply Continuous PRN 11/13/19 0312 11/16/19 0311          Other Medications:  Current Facility-Administered Medications   Medication Dose Route Frequency Provider Last Rate Last Dose   • [START ON 11/15/2019] ! Vancomycin trough 11/15 at 0830.  Please hold vancomycin dose 11/15 at 0900  until pharmacy can evaluate level   Does not apply Once Jose Lopez, [facility]       • acetaminophen (TYLENOL) tablet 650 mg  650 mg Oral Q4H PRN Day, Adelita PINEDA MD   650 mg at 11/13/19 1009    Or   • acetaminophen (TYLENOL) suppository 650 mg  650 mg Rectal Q4H PRN Day, Adelita PINEDA MD       • acyclovir (ZOVIRAX) 500 mg in sodium chloride 0.9 % 100 mL IVPB  10 mg/kg (Ideal) Intravenous Once Dossett, Bruno PANG MD   500 mg at 11/13/19 0942   • ampicillin 2 g/100 mL 0.9% NS (MBP)  2 g Intravenous Q4H Day, Adelita PINEDA MD   2 g at 11/13/19 0858   • aspirin chewable tablet 81 mg  81 mg Oral Daily Day, Adelita PINEDA MD   81 mg at 11/13/19 0858   • atorvastatin (LIPITOR) tablet 80 mg  80 mg Oral Nightly Day, Adelita PINEDA MD       • cefepime (MAXIPIME) 2 g/100 mL 0.9% NS (mbp)  2 g Intravenous Q8H Day, Adelita PINEDA MD       • dextrose (D50W) 25 g/ 50mL Intravenous Solution 25 g  25 g Intravenous Q15 Min PRN Dossett, Bruno PANG MD       • dextrose (GLUTOSE) oral gel 15 g  15 g Oral Q15 Min PRN Dossett, Bruno PANG MD       • glucagon (human recombinant) (GLUCAGEN DIAGNOSTIC) injection 1 mg  1 mg Subcutaneous Q15 Min PRN Dossetradha, Bruno PANG MD       • insulin lispro (humaLOG) injection 0-7 Units  0-7 Units Subcutaneous Q6H Dossett, Bruno PANG MD   2 Units at 11/13/19 0942   • levothyroxine (SYNTHROID, LEVOTHROID) tablet 75 mcg  75 mcg Oral Daily Day, Adelita PINEDA MD       • ondansetron (ZOFRAN) injection 4 mg  4 mg Intravenous Q6H PRN Susanne Allen APRN   4 mg at 11/13/19 0459   • Pharmacy to dose vancomycin   Does not apply Continuous PRN Day, Adelita PINEDA MD       • sodium chloride 0.9 % flush 10 mL  10 mL Intravenous Q12H Day, Adelita PINEDA MD       • sodium chloride 0.9 % flush 10 mL  10 mL Intravenous PRN Day, Adelita PINEDA MD       • sodium chloride 0.9 % infusion  100 mL/hr Intravenous Continuous Day, Adelita PINEDA  mL/hr at 11/13/19 0717 100 mL/hr at 11/13/19 0717   • vancomycin in dextrose 5% 150 mL (VANCOCIN) IVPB 750 mg  15 mg/kg Intravenous Q12H  "Jose Lopez NAVIN, [facility]           Physical Exam:   Vital Signs   /77 (Patient Position: Lying)   Pulse 97   Temp 98.4 °F (36.9 °C) (Oral)   Resp 18   Ht 157.5 cm (62.01\")   Wt 57.4 kg (126 lb 8 oz)   SpO2 90%   BMI 23.13 kg/m²     GENERAL: Awake and alert, confused, oriented to name only.   HEENT: Normocephalic, atraumatic.  PERRL. EOMI. No conjunctival injection. No icterus. Oropharynx clear without evidence of thrush or exudate or fever blister.   NECK: Supple without nuchal rigidity or meningismus. No mass.  LYMPH: No cervical lymphadenopathy.  HEART: RRR; No murmur.  LUNGS: Clear to auscultation bilaterally without wheezing, rales, rhonchi. Normal respiratory effort. Nonlabored.  ABDOMEN: Soft, mild RLQ pain, nondistended. Positive bowel sounds. No rebound or guarding.  EXT:  No cyanosis, clubbing or edema.  : Without Whitehead catheter.  MSK: FROM without joint effusions noted arms/legs.    SKIN: Warm and dry without cutaneous eruptions on Inspection/palpation.    NEURO: Oriented to person only. No focal deficits on motor/sensory exam at arms/legs.  PSYCHIATRIC: confused but cooperative.     Laboratory Data    Results from last 7 days   Lab Units 11/13/19  0524   WBC 10*3/mm3 12.97*   HEMOGLOBIN g/dL 12.4   HEMATOCRIT % 38.3   PLATELETS 10*3/mm3 293     Results from last 7 days   Lab Units 11/13/19  0524   SODIUM mmol/L 137   POTASSIUM mmol/L 3.7   CHLORIDE mmol/L 99   CO2 mmol/L 22.0   BUN mg/dL 15   CREATININE mg/dL 0.79   GLUCOSE mg/dL 184*   CALCIUM mg/dL 8.7     Estimated Creatinine Clearance: 52.5 mL/min (by C-G formula based on SCr of 0.79 mg/dL).  Results from last 7 days   Lab Units 11/13/19  0524   ALK PHOS U/L 106   BILIRUBIN mg/dL 0.7   ALT (SGPT) U/L 31   AST (SGOT) U/L 29               Microbiology:        11/13 blood cultures pending  Influenza PCR negative        Radiology:  Ct Angiogram Neck    Result Date: 11/13/2019  1. Densely calcified plaque at the bilateral carotid " bifurcations. By NASCET criteria there is 0% diameter stenosis on the right and approximately 30% diameter stenosis on the left. (Degree of estimated stenosis on the left is a change from the preliminary report; please note the degree of stenosis is difficult to accurately estimate given the density of calcified plaque.) 2. Both vertebral arteries are patent and codominant. 3. No intracranial vascular cutoff or focal central stenosis allowing for hypoplastic right A1 vessel. 4. 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 Signer Name: Avelina Freitas MD  Signed: 11/13/2019 8:55 AM  Workstation Name: [id number]  [facility]    Mri Brain Without Contrast    Result Date: 11/13/2019  1. Incomplete study limited to only diffusion-weighted imaging. 2. No gross evidence of acute ischemia or other restricted diffusion. 3. Recommend CT imaging if not already performed. Signer Name: Pascual Sandy MD  Signed: 11/13/2019 5:02 AM  Workstation Name: [facility]  [facility]    Xr Chest 1 View    Result Date: 11/13/2019  No active disease. Cardiomegaly. Signer Name: Pascual Sandy MD  Signed: 11/13/2019 4:11 AM  Workstation Name: [facility]  [facility]    Ct Angiogram Head    Result Date: 11/13/2019  1. Densely calcified plaque at the bilateral carotid bifurcations. By NASCET criteria there is 0% diameter stenosis on the right and approximately 30% diameter stenosis on the left. (Degree of estimated stenosis on the left is a change from the preliminary report; please note the degree of stenosis is difficult to accurately estimate given the density of calcified plaque.) 2. Both vertebral arteries are patent and codominant. 3. No intracranial vascular cutoff or focal central stenosis allowing for hypoplastic right A1 vessel. 4. 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 Signer Name: Avelina Freitas MD   Signed: 11/13/2019 8:55 AM  Workstation Name: [id number]  [facility]    Ct Cerebral Perfusion With & Without Contrast    Result Date: 11/13/2019  No focal area of acute ischemia identified. Signer Name: Rodger Muniz MD  Signed: 11/13/2019 7:16 AM  Workstation Name: [email or web address]  [facility]     I personally reviewed the above CXR      Impression:   1. Fever, sepsis: source not entirely clear yet. complicated by acute severe encephalopathy. Blood cx pending. UA pending. CXR clear. influenza PCR negative. + sick contact with viral URI suggests URI, but usually that does not make patients this confused, so I suspect something else is going on. . No recent procedures, wounds, etc. Meningitis is on differential; lower suspicion for bacterial meningitis but will need LP rule it out. On empiric antibiotics in the meantime.     2. Acute encephalopathy: found down. Likely due to infection. Head imaging negative for CVA so far. Neurology consult pending. Improving over past 24 hrs per family  3. Leukocytosis, no differential on CBC yet  4. RLQ discomfort: mild on palpation. UTI, Diverticulitis, appendicitis?  5. Fecal incontinence when found down: loose stools but no diarrhea per RN  6. 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  7. HTN  8. HLD  9. Hx of thyroid disease: TSH ok    PLAN:    - f/u any pending cultures done in [place]  - f/u pending blood cx done at [facility]  - UA sent. Will add culture     - LP ordered. Seen for cell count, diff, protein, glucose, culture, meningitis/encephalitis panel    - consider CT abdomen/pelvis if UA is benign. To investigate RLQ pain    - empiric antibiotics to cover possible bacterial and viral meningitis. Also cover other potential sources: UTI, etc.    IV vancomycin   IV cefepime   IV ampicillin   IV acyclovir    Pharmacy to assist with vancomycin dosing and medication monitoring to limit risk of toxicity    Will  de-escalate based on pending workup. Discussed with RN and Dr Ocampo.    Jose Kee MD  11/13/2019

## 2019-11-13 NOTE — PLAN OF CARE
Problem: Patient Care Overview  Goal: Plan of Care Review  Outcome: Ongoing (interventions implemented as appropriate)   11/13/19 1993   Coping/Psychosocial   Plan of Care Reviewed With patient   OTHER   Outcome Summary Pt alert to self only, alert most of shift, family visiting today, pt had lots of tests/procedures done. NIH 6 (was 22 on night shift). ID and neuro following now. IV abx infusing all day. VSS with permissive HTN, tele NSR. Pt primafit in place, but can ask for bedpan PRN. Pt placed on THEODORE bed. Pt tolerating regular diet well, SLP following. PT/OT hasnt seen yet r/t patients busy day. Skin intact, bruising noted. Will continue to monitior.    Plan of Care Review   Progress no change

## 2019-11-13 NOTE — POST-PROCEDURE NOTE
Radiology Procedure    Pre-procedure: informed consent obtained from the patient's son     Procedure Performed: lumbar puncture     IV Sedation and/or Anesthesia:  No    Complications: none    Preliminary Findings: pending    Specimen Removed: 6cc clear, colorless CSF    Estimated Blood Loss:  0ml    Post-Procedure Diagnosis: pending    Post-Procedure Plan: encourage fluids, bed rest x 2 hours    Standard Discharge Instructions Given:yes     BERENICE Spear  11/13/19  2:16 PM

## 2019-11-13 NOTE — PLAN OF CARE
Problem: Patient Care Overview  Goal: Plan of Care Review  Outcome: Ongoing (interventions implemented as appropriate)   11/13/19 1030   Coping/Psychosocial   Plan of Care Reviewed With patient;son;other (see comments)  (daughter-in-law)   SLP evaluation completed. Will address cognitive-communication deficit during treatment. Pt passed repeat RN dysphagia screen, consult if any concerns arise. Please see note for further details and recommendations.

## 2019-11-13 NOTE — NURSING NOTE
Per RN April request Pt with low shanthi score, impaired mobility and at risk for skin breakdown.  Low air loss mattress and regular bed frame ordered from [employer] [phone].

## 2019-11-13 NOTE — PROGRESS NOTES
Please refer to the history and physical dated today for full details.  I seen and examined the patient.  She is a relatively healthy 79-year-old female who was last seen normal the evening of 11/11.  Last night on 11/12 she was not answering her phone and so family went over and she was found lethargic in her bed.  Initial work-up at outside emergency room was generally unremarkable other than a low-grade temperature, white blood cell count of 13,000.  Head CT is negative.  Due to concern for stroke she was transferred to [place] for further evaluation.  MRI has been done however was very poor study, but grossly there is no evidence of stroke.  CTA and CT perfusion study done.  Perfusion study was negative, CTA showed left internal carotid artery 50 to 70% stenosis.  Temperature here is 101.2, she is tachycardic, she is also hypertensive.  Son is at bedside and says that she has gradually improved since initial presentation.  She is following commands and has no obvious neuro deficits other than speech at this time.  She makes some sounds but no formed words.  On my exam she did not have any meningeal signs.  At this time presentation seems more metabolic than acute neuro event, but neurology be consulted.  Give consideration to repeat MRI once able to be more cooperative.  Consider EEG.  This time she is also on broad-spectrum antibiotics including empiric coverage for meningitis.  However on exam she has no meningeal signs and she is improving without treatment so that seems less likely as well.  At this time hold off on LP but have a low threshold.  Given the severity of her presentation I am and ask infectious disease to consult to  assist with antibiotic management.    I did discuss with the son at the bedside and she would wish to be a DO NOT RESUSCITATE.    Electronically signed by Bruno Ocampo MD, 11/13/19, 8:03 AM.

## 2019-11-13 NOTE — PROGRESS NOTES
"Pharmacokinetic Consult - Vancomycin Dosing  Aura Fischer is a 79 y.o. female who is receiving vancomycin for meningitis and sepsis    Ordering Provider: hospitalist  Infectious Disease Consult: No  Goal trough: 15-20 mcg/mL    Ht - 157.5 cm (62.01\")  Wt - 57.4 kg (126 lb 8 oz)    Current Antimicrobial Therapy  Ampicillin 2 gm IV q4h  Cefepime 2 gm IV q8h (extended infusion)  Vancomycin 750 mg IV q12h    Allergies  Sulfa antibiotics    Microbiology and Radiology  Microbiology Results (last 10 days)       ** No results found for the last 240 hours. **          Relevant clinical data and objective history reviewed:  Results from last 7 days   Lab Units 11/13/19  0524   BUN mg/dL 15   CREATININE mg/dL 0.79    Estimated Creatinine Clearance: 52.5 mL/min (by C-G formula based on SCr of 0.79 mg/dL).   Intake & Output (last 3 days)         11/10 0701 - 11/11 0700 11/11 0701 - 11/12 0700 11/12 0701 - 11/13 0700           Urine Unmeasured Occurrence   1 x    Stool Unmeasured Occurrence   2 x          Asessment/Plan  1. Will give vancomycin 1500 mg IV once                                                followed by      Vancomycin 750 mg IV q 12 hours    2. Vancomycin trough is scheduled 11/15 at 0830 prior to the 5th dose    Jose Lopez, PharmD, [facility]  11/13/2019  7:00 AM   "

## 2019-11-13 NOTE — CONSULTS
Consult received for DM education. A1c was 5.9%. No history of diabetes per chart review. Please reconsult if educational needs change. Thank you

## 2019-11-13 NOTE — PROGRESS NOTES
Discharge Planning Assessment  [facility]     Patient Name: Aura Fischer  MRN: [id number]  Today's Date: 11/13/2019    Admit Date: 11/13/2019    Discharge Needs Assessment     Row Name 11/13/19 0812       Living Environment    Lives With  alone    Current Living Arrangements  home/apartment/condo    Primary Care Provided by  self    Provides Primary Care For  no one    Family Caregiver if Needed  child(mariaa), adult    Family Caregiver Names  Nicolas Fischer (son) [phone]    Quality of Family Relationships  helpful;supportive;involved    Able to Return to Prior Arrangements  no       Resource/Environmental Concerns    Resource/Environmental Concerns  none    Transportation Concerns  car, none       Transition Planning    Patient/Family Anticipates Transition to  home    Patient/Family Anticipated Services at Transition  none    Transportation Anticipated  family or friend will provide       Discharge Needs Assessment    Readmission Within the Last 30 Days  no previous admission in last 30 days    Concerns to be Addressed  denies needs/concerns at this time    Equipment Currently Used at Home  none    Anticipated Changes Related to Illness  none    Equipment Needed After Discharge  none    Discharge Facility/Level of Care Needs  nursing facility, skilled    Offered/Gave Vendor List  yes        Discharge Plan     Row Name 11/13/19 0813       Plan    Plan  TBD    Patient/Family in Agreement with Plan  yes    Plan Comments  Spoke with patient and son at bedside. Lives alone in [place]. Caregiver is Nicolas Fischer (son) [phone]. Is independent with ADL's. No problems with insurance or medications. Has a no DME at home. Plan is TBD. CM will continue to follow.        Destination      No service coordination in this encounter.      Durable Medical Equipment      No service coordination in this encounter.      Dialysis/Infusion      No service coordination in this encounter.      Home Medical Care      No  service coordination in this encounter.      Therapy      No service coordination in this encounter.      Community Resources      No service coordination in this encounter.          Demographic Summary     Row Name 11/13/19 0811       General Information    Admission Type  inpatient    Arrived From  hospital    Referral Source  admission list    Reason for Consult  discharge planning    Preferred Language  English     Used During This Interaction  no       Contact Information    Permission Granted to Share Info With      Contact Information Obtained for      Contact Information Comments  PCP is Jose Manuel Cabrales MD        Functional Status     Row Name 11/13/19 0811       Functional Status    Usual Activity Tolerance  good    Current Activity Tolerance  good       Functional Status, IADL    Medications  independent    Meal Preparation  independent    Housekeeping  independent    Laundry  independent    Shopping  independent       Mental Status    General Appearance WDL  WDL       Mental Status Summary    Recent Changes in Mental Status/Cognitive Functioning  unable to assess       Employment/    Employment Status  retired        Psychosocial    No documentation.       Abuse/Neglect    No documentation.       Legal    No documentation.       Substance Abuse    No documentation.       Patient Forms    No documentation.           Pascual Lucio RN

## 2019-11-13 NOTE — PLAN OF CARE
Problem: Patient Care Overview  Goal: Plan of Care Review  Outcome: Ongoing (interventions implemented as appropriate)   11/13/19 2620   Coping/Psychosocial   Plan of Care Reviewed With patient;son;family   OTHER   Outcome Summary Pt arrived to floor around 0145. NIH 22. Disoriented x4. Family at bedside. Seizure precautions in place. Pt is incontinent and is on room air. Patient failed bedside dysphagia screening and is NPO. IV in left AC from an outside facility, but there is an order to keep it. A new 18g was started. Patient is awaiting CT/CTA and EEG. Pt NSR on tele. Plan is for patient to stay for more medical testing to rule out stroke. Will continue to monitior.

## 2019-11-13 NOTE — THERAPY EVALUATION
Acute Care - Speech Language Pathology Initial Evaluation  [facility]   Cognitive-Communication Evaluation     Patient Name: Aura Fischer  : 1940  MRN: [id number]  Today's Date: 2019               Admit Date: 2019     Visit Dx:    ICD-10-CM ICD-9-CM   1. Cognitive communication deficit R41.841 799.52     Patient Active Problem List   Diagnosis   • Rectal bleeding   • Healthcare maintenance   • Anemia   • Anxiety disorder   • Gastroesophageal reflux disease without esophagitis   • Mixed hyperlipidemia   • Essential hypertension   • Acquired hypothyroidism   • Recurrent pancreatitis   • Depression   • Osteoporosis   • Encephalopathy   • Acute UTI (urinary tract infection)   • Sepsis, unspecified organism (CMS/HCC)   • Lactic acidosis     Past Medical History:   Diagnosis Date   • Anemia    • Diverticulosis    • Hyperlipidemia    • Hypertension    • Internal hemorrhoid    • Mood disorder (CMS/HCC)    • Thyroid disease      Past Surgical History:   Procedure Laterality Date   • BREAST SURGERY     • CHOLECYSTECTOMY     • HEMORRHOIDECTOMY     • HYSTERECTOMY     • PANCREATECTOMY     • SPLENECTOMY          SLP EVALUATION (last 72 hours)      SLP SLC Evaluation     Row Name 19 1030                   Communication Assessment/Intervention    Document Type  evaluation  -RD        Subjective Information  no complaints  -RD        Patient Observations  alert;cooperative  -RD        Patient/Family Observations  Son and daughter-in-law present  -RD        Patient Effort  adequate  -RD           General Information    Patient Profile Reviewed  yes  -RD        Pertinent History Of Current Problem  Adm w/ Encephalopathy, R/o CVA. Passed repeat RN dysphagia screen. mixed HLD, HTN, UTI, sepsis, GERD, osteoporosis.   -RD        Precautions/Limitations, Vision  WFL with corrective lenses  -RD        Precautions/Limitations, Hearing  WFL;for purposes of eval  -RD        Patient Level of Education  high school  education  -RD        Prior Level of Function-Communication  WFL  -RD        Plans/Goals Discussed with  patient;family;agreed upon  -RD        Barriers to Rehab  none identified  -RD        Patient's Goals for Discharge  patient did not state  -RD        Family Goals for Discharge  patient able to return to previous activities/roles;functional communication  -RD           Pain Assessment    Additional Documentation  Pain Scale: FACES Pre/Post-Treatment (Group)  -RD           Pain Scale: FACES Pre/Post-Treatment    Pain: FACES Scale, Pretreatment  0-->no hurt  -RD        Pain: FACES Scale, Post-Treatment  0-->no hurt  -RD           Comprehension Assessment/Intervention    Comprehension Assessment/Intervention  Auditory Comprehension;Reading Comprehension  -RD           Auditory Comprehension Assessment/Intervention    Auditory Comprehension (Communication)  moderate impairment;severe impairment;unable/difficult to assess  -RD        Able to Identify Objects/Pictures (Communication)  severe impairment;familiar objects  -RD        Answers Questions (Communication)  moderate impairment;severe impairment;simple;yes/no;other (see comments) inconsistent via head nod  -RD        Able to Follow Commands (Communication)  moderate impairment;severe impairment;1-step  -RD        Narrative Discourse  unable/difficult to assess  -RD        Successful Auditory Strategies (Communication)  repetition;visual cues;decrease environmental distractions  -RD        Auditory Comprehension Communication, Comment  Difficult to fully assess 2' pt perseverating on commands and verbalizations. Inconsistently able to head nod for correct simple Y/N questions, but only w/ cues. Able to follow some oral commands w/ a model, but pt perseverating on tongue protrusion when asked further questions  -RD           Reading Comprehension Assessment/Intervention    Reading Comprehension (Communication)  unable/difficult to assess;other (see comments) 2'  severity of communication deficits  -RD           Expression Assessment/Intervention    Expression Assessment/Intervention  verbal expression;graphic expression  -RD           Verbal Expression Assessment/Intervention    Verbal Expression  moderate impairment;severe impairment  -RD        Automatic Speech (Communication)  moderate impairment;response to greeting  -RD        Repetition  unable/difficult to assess  -RD        Phrase Completion  unable/difficult to assess  -RD        Responsive Naming  moderate impairment;severe impairment;simple;perseverations  -RD        Confrontational Naming  severe impairment  -RD        Spontaneous/Functional Words  moderate impairment;severe impairment;perseverations  -RD        Sentence Formulation  severe impairment  -RD        Conversational Discourse/Fluency  moderate impairment;severe impairment;perseverations  -RD        Verbal Expression, Comment  Pt perseverating on verbalizations (word/short phrase responses) as well as oral/physical commands.   -RD           Graphic Expression Assessment/Intervention    Graphic Expression  unable/difficult to assess;other (see comments) 2' severity of communication deficits  -RD           Oral Musculature and Cranial Nerve Assessment    Oral Motor General Assessment  WFL  -RD        Oral Motor, Comment  Pt able to follow some oral commands. No immediate wkns noted, however perseverating on tongue protrusion tasks.   -RD           Motor Speech Assessment/Intervention    Motor Speech Function  unable/difficult to assess  -RD        Motor Speech, Comment  Difficult to fully assess 2' perseverations and unable to assess at conversational level. No immediate dysarthria noted w/ word/short phrase responses during eval.   -RD           Cognitive Assessment Intervention- SLP    Cognitive Function (Cognition)  unable/difficult to assess;other (see comments) 2' perseverations  -RD        Orientation Status (Cognition)  moderate  "impairment;person;unable/difficult to assess;other (see comments) pt perseverating on birthday for all cognitive ?'s  -RD        Cognition, Comment  Difficult to fully assess 2' pt perseverating on birth date \"[birth year]\" and oral commands/head nodding for all questions asked.   -RD           SLP Clinical Impressions    SLP Diagnosis  Moderate-severe communication deficit. Continue to address during tx/tx  -RD        Rehab Potential/Prognosis  good  -RD        SLC Criteria for Skilled Therapy Interventions Met  yes  -RD        Functional Impact  difficulty communicating wants, needs;difficulty communicating in an emergency;difficulty in expressing complex messages;functional impact in ADLs  -RD           Recommendations    Therapy Frequency (SLP SLC)  5 days per week  -RD        Predicted Duration Therapy Intervention (Days)  until discharge  -RD        Anticipated Dischage Disposition  unknown;anticipate therapy at next level of care  -RD           Communication Treatment Objective and Progress Goals (SLP)    Auditory Comprehension Treatment Objectives  Auditory Comprehension Treatment Objectives (Group)  -RD        Verbal Expression Treatment Objectives  Verbal Expression Treatment Objectives (Group)  -RD        Additional Goals  Additional Goals (Group)  -RD           Auditory Comprehension Treatment Objectives    Words/Phrases/Sentences Selection  words/phrases/sentences, SLP goal 1  -RD        Comprehend Questions Selection  comprehend questions, SLP goal 1  -RD        Follow Directions Selection  follow directions, SLP goal 1  -RD           Words/Phrases/Sentences Goal 1 (SLP)    Improve Ability to Comprehend Words/Phrases/Sentences Through: Goal 1 (SLP)  identify objects, field of;identify body part;80%;with minimal cues (75-90%) 2  -RD        Time Frame (Identify Objects and Pictures Goal 1, SLP)  short term goal (STG);by discharge  -RD           Comprehend Questions Goal 1 (SLP)    Improve Ability to " Comprehend Questions Goal 1 (SLP)  simple yes/no questions;simple wh questions;80%;with minimal cues (75-90%)  -RD        Time Frame (Comprehend Questions Goal 1, SLP)  short term goal (STG);by discharge  -RD           Follow Directions Goal 2 (SLP)    Improve Ability to Follow Directions Goal 1 (SLP)  1 step direction with objects;1 step direction without objects;80%;with minimal cues (75-90%)  -RD        Time Frame (Follow Directions Goal 1, SLP)  short term goal (STG);by discharge  -RD           Verbal Expression Treatment Objectives    Word Retrieval Skills Selection  word retrieval, SLP goal 1  -RD           Word Retrieval Skills Goal 1 (SLP)    Improve Word Retrieval Skills By Goal 1 (SLP)  completing automatic speech task, counting;completing automatic speech task, alphabet;completing automatic speech task, days of the week;responsive naming task;simple;answer WH question with one word;80%  -RD        Time Frame (Word Retrieval Goal 1, SLP)  short term goal (STG);by discharge  -RD           Additional Goals    Additional Goal Selection  additional goal, SLP goal 1  -RD           Additional Goal 1 (SLP)    Additional Goal 1, SLP  LTG: Pt will improve cognitive-communication skills to actively participate in care w/ 100% accuracy w/o cues  -RD        Time Frame (Additional Goal 1, SLP)  by discharge  -RD          User Key  (r) = Recorded By, (t) = Taken By, (c) = Cosigned By    Initials Name Effective Dates    Fara Vitale MS CCC-SLP 04/03/18 -              EDUCATION  The patient has been educated in the following areas:     Cognitive Impairment Communication Impairment.    SLP Recommendation and Plan  SLP Diagnosis: Moderate-severe communication deficit. Continue to address during tx/tx     SLC Criteria for Skilled Therapy Interventions Met: yes  Anticipated Dischage Disposition: unknown, anticipate therapy at next level of care     Predicted Duration Therapy Intervention (Days): until  discharge    Plan of Care Reviewed With: patient, son, other (see comments)(daughter-in-law)      SLP GOALS     Row Name 11/13/19 1030             Words/Phrases/Sentences Goal 1 (SLP)    Improve Ability to Comprehend Words/Phrases/Sentences Through: Goal 1 (SLP)  identify objects, field of;identify body part;80%;with minimal cues (75-90%) 2  -RD      Time Frame (Identify Objects and Pictures Goal 1, SLP)  short term goal (STG);by discharge  -RD         Comprehend Questions Goal 1 (SLP)    Improve Ability to Comprehend Questions Goal 1 (SLP)  simple yes/no questions;simple wh questions;80%;with minimal cues (75-90%)  -RD      Time Frame (Comprehend Questions Goal 1, SLP)  short term goal (STG);by discharge  -RD         Follow Directions Goal 2 (SLP)    Improve Ability to Follow Directions Goal 1 (SLP)  1 step direction with objects;1 step direction without objects;80%;with minimal cues (75-90%)  -RD      Time Frame (Follow Directions Goal 1, SLP)  short term goal (STG);by discharge  -RD         Word Retrieval Skills Goal 1 (SLP)    Improve Word Retrieval Skills By Goal 1 (SLP)  completing automatic speech task, counting;completing automatic speech task, alphabet;completing automatic speech task, days of the week;responsive naming task;simple;answer WH question with one word;80%  -RD      Time Frame (Word Retrieval Goal 1, SLP)  short term goal (STG);by discharge  -RD         Additional Goal 1 (SLP)    Additional Goal 1, SLP  LTG: Pt will improve cognitive-communication skills to actively participate in care w/ 100% accuracy w/o cues  -RD      Time Frame (Additional Goal 1, SLP)  by discharge  -RD        User Key  (r) = Recorded By, (t) = Taken By, (c) = Cosigned By    Initials Name Provider Type    Fara Vitale MS CCC-SLP Speech and Language Pathologist                  Time Calculation:     Time Calculation- SLP     Row Name 11/13/19 1536             Time Calculation- SLP    SLP Start Time  1030  -RD       SLP Received On  11/13/19  -JACKIE        User Key  (r) = Recorded By, (t) = Taken By, (c) = Cosigned By    Initials Name Provider Type    Fara Vitale MS CCC-SLP Speech and Language Pathologist          Therapy Charges for Today     Code Description Service Date Service Provider Modifiers Qty    [id number]  ST EVAL SPEECH AND PROD W LANG  4 11/13/2019 Fara Mccarthy MS CCC-SLP GN 1                     Fara Mccarthy MS CCC-SLP  11/13/2019

## 2019-11-13 NOTE — CONSULTS
Referring Provider: Dr. Ocampo  Reason for Consultation: Encephalopathy,     Patient Care Team:  Jose Manuel Cabrales MD as PCP - General  Jose Manuel Cabrales MD as PCP - Family Medicine  HyampomEnoch MD as PCP - Claims Attributed    Chief complaint encephalopathy    Subjective .     History of present illness: 79-yo RH woman with PMH notable for HLD, HTN, diverticulosis transferred here last night from [facility] for confusion.  She was last known well on the evening of 11/11 and found down in her home yesterday evening.  Son had spoken with her on the phone the night before, at which time she was talking normally.  Per report did complain of sore throat and sinus congestion.  At baseline living alone and independent.  Yesterday she was found nearly unresponsive in bed with fecal incontinence and after that was noted to have garbled speech and was staring off.  She was able to say her name and answer some simple questions.  She also developed a fever.  At the outside ER she had a low-grade temperature and white cell count of 13,000 with negative head CT.  On transfer here she underwent limited brain MRI with motion artifact, but diffusion-weighted images showed no stroke.  CTA and CT perfusion overall unremarkable except for 2 mm aneurysm on a tiny left P-comm.  Temperature here was up to 101.2 and she improved to the point of following commands, although she was not speaking.  She was placed on broad-spectrum antibiotics including empiric coverage for meningitis.  No meningeal signs noted at that time.  Son at her bedside now notes significant improvement in her mental status, although she is not at baseline. States yesterday she could not talk and did not recognize him.   Pt denies any pain, and headache in particular. Denies chest or abdo pain, nausea or shortness of breath. Cannot answer regarding vision, including blurred or double vision.  Very limited historian but appears to have no specific  complaints.  Acknowledges no memory of recent events.    Review of Systems  Pertinent items are noted in HPI, all other systems reviewed and negative to the extent that she is an accurate historian    History  Past Medical History:   Diagnosis Date   • Anemia    • Diverticulosis    • Hyperlipidemia    • Hypertension    • Internal hemorrhoid    • Mood disorder (CMS/HCC)    • Thyroid disease    ,   Past Surgical History:   Procedure Laterality Date   • BREAST SURGERY     • CHOLECYSTECTOMY     • HEMORRHOIDECTOMY     • HYSTERECTOMY     • PANCREATECTOMY     • SPLENECTOMY     , History reviewed. No pertinent family history.,   Social History     Tobacco Use   • Smoking status: Never Smoker   Substance Use Topics   • Alcohol use: No   • Drug use: No   ,   Medications Prior to Admission   Medication Sig Dispense Refill Last Dose   • alendronate (FOSAMAX) 70 MG tablet Take 1 tablet by mouth 1 (one) time per week.   Unknown at Unknown time   • atorvastatin (LIPITOR) 10 MG tablet TAKE 1 TABLET AT BEDTIME 90 tablet 1 Unknown at Unknown time   • FLUoxetine (PROzac) 10 MG capsule Take 1 capsule by mouth daily.   Unknown at Unknown time   • levothyroxine (SYNTHROID, LEVOTHROID) 75 MCG tablet TAKE 1 TABLET EVERY DAY (NEED MD APPOINTMENT) 90 tablet 3 Unknown at Unknown time   • promethazine (PHENERGAN) 25 MG tablet Take  by mouth.   Unknown at Unknown time   • trandolapril (MAVIK) 2 MG tablet TAKE 1 TABLET EVERY DAY 90 tablet 5 Unknown at Unknown time   • vitamin d (RA VITAMIN D-3) 5000 UNITS capsule Take 1 tablet by mouth daily.   Unknown at Unknown time   , Scheduled Meds:    [START ON 11/15/2019] Pharmacy Consult  Does not apply Once   acyclovir 10 mg/kg (Ideal) Intravenous Once   ampicillin 2 g Intravenous Q4H   aspirin 81 mg Oral Daily   atorvastatin 80 mg Oral Nightly   cefepime 2 g Intravenous Q8H   insulin lispro 0-7 Units Subcutaneous Q6H   levothyroxine 75 mcg Oral Daily   sodium chloride 10 mL Intravenous Q12H  "  vancomycin 25 mg/kg Intravenous Once   vancomycin 15 mg/kg Intravenous Q12H   , Continuous Infusions:    Pharmacy to dose vancomycin     sodium chloride 100 mL/hr Last Rate: 100 mL/hr (11/13/19 1717)   , PRN Meds:  acetaminophen **OR** acetaminophen  •  dextrose  •  dextrose  •  glucagon (human recombinant)  •  ondansetron  •  Pharmacy to dose vancomycin  •  sodium chloride and Allergies:  Sulfa antibiotics    Objective     Vital Signs   Blood pressure 173/77, pulse 97, temperature 98.4 °F (36.9 °C), temperature source Oral, resp. rate 18, height 157.5 cm (62.01\"), weight 57.4 kg (126 lb 8 oz), SpO2 90 %.    Physical Exam:   Elderly white woman lying in the hospital bed, ill-appearing, sleeping, but wakes to conversation in the room.  Regards and is appropriate.  Speech is fluent but comprehension limited and attention is impaired.  She is oriented to hospital and [place] but not to date.  She names daughter-in-law at bedside but calls her son her son-in-law.  Unable to state where she lives.  No dysarthria.  Pupils 3 mm and reactive, visual fields full to confrontation as far as can be assessed, EOMI, normal facial sensation and movement, palate and shoulders elevate symmetrically and tongue protrudes midline  Motor: No obvious focal or lateralized weakness but patient has limited capacity to follow instructions for details manual muscle testing.  Appears apraxic at least with the right upper extremity when asked to perform finger-nose-finger and unable to cooperate with heel-knee-shin.  No reflex asymmetry, bilateral toes withdrawal versus upgoing on plantar stim  Muscle tone is normal; light touch appears symmetric  Neck is supple, no meningismus; no bruits appreciated  Heart RRR no murmur appreciated  Lungs clear anteriorly, normal respiratory effort, symmetric chest wall expansion  Abdomen soft, NT, ND with positive bowel sounds  Extremities without cyanosis or edema  Skin warm and dry, no rashes " appreciated      Results Review:   I reviewed the patient's new clinical results.  I reviewed the patient's new imaging results and agree with the interpretation.  I reviewed the patient's other test results and agree with the interpretation  Labs reviewed, lipid panel: 139/72/58/67  CMP with glucose 184 otherwise normal  Troponin less than 0.01  White cell count 12.97 H&H 12 and 38, platelets 293  TSH normal at 0.508  Brain MRI, CT perfusion and CTA images reviewed, no evidence of stroke,   2 mm left P-comm aneurysm, no significant stenoses  EEG shows mild to moderate generalized slowing, no epileptiform activity    Assessment/Plan       Mixed hyperlipidemia    Essential hypertension    Acquired hypothyroidism    Encephalopathy    Acute UTI (urinary tract infection)    Sepsis, unspecified organism (CMS/HCC)    Lactic acidosis      Encephalopathy in setting of acute febrile illness-unclear whether systemic or CNS infection.  ID following and has ordered LP.  Agree with plan including coverage with empiric antibiotics for meningitis.  Initial more marked encephalopathy with fecal incontinence raises question of seizure at onset which may have complicated either systemic or CNS infection.  EEG unrevealing but does not show periodic discharges and anticonvulsants not warranted without better evidence of seizure.  Improvement in mentation could be either due to clearing of postictal state or treatment of underlying infection with empiric antibiotics.   Will follow.    I discussed the patients findings and my recommendations with patient and family    Zina Delcid MD  11/13/19  9:34 AM

## 2019-11-14 PROBLEM — G93.41 ACUTE METABOLIC ENCEPHALOPATHY: Status: ACTIVE | Noted: 2019-11-13

## 2019-11-14 LAB
BACTERIA SPEC AEROBE CULT: NO GROWTH
GLUCOSE BLDC GLUCOMTR-MCNC: 104 MG/DL (ref 70–130)
GLUCOSE BLDC GLUCOMTR-MCNC: 124 MG/DL (ref 70–130)
GLUCOSE BLDC GLUCOMTR-MCNC: 133 MG/DL (ref 70–130)
GLUCOSE BLDC GLUCOMTR-MCNC: 152 MG/DL (ref 70–130)

## 2019-11-14 PROCEDURE — 25010000002 CEFEPIME PER 500 MG: Performed by: INTERNAL MEDICINE

## 2019-11-14 PROCEDURE — 25010000002 HYDRALAZINE PER 20 MG: Performed by: NURSE PRACTITIONER

## 2019-11-14 PROCEDURE — 25010000002 ACYCLOVIR PER 5 MG: Performed by: INTERNAL MEDICINE

## 2019-11-14 PROCEDURE — 25010000002 PROMETHAZINE PER 50 MG: Performed by: INTERNAL MEDICINE

## 2019-11-14 PROCEDURE — 25010000002 VANCOMYCIN PER 500 MG

## 2019-11-14 PROCEDURE — 25010000002 ONDANSETRON PER 1 MG: Performed by: NURSE PRACTITIONER

## 2019-11-14 PROCEDURE — 99233 SBSQ HOSP IP/OBS HIGH 50: CPT | Performed by: INTERNAL MEDICINE

## 2019-11-14 PROCEDURE — 99232 SBSQ HOSP IP/OBS MODERATE 35: CPT | Performed by: PSYCHIATRY & NEUROLOGY

## 2019-11-14 PROCEDURE — 25010000002 AMPICILLIN PER 500 MG: Performed by: INTERNAL MEDICINE

## 2019-11-14 PROCEDURE — 97166 OT EVAL MOD COMPLEX 45 MIN: CPT | Performed by: OCCUPATIONAL THERAPIST

## 2019-11-14 PROCEDURE — 25010000002 CEFTRIAXONE PER 250 MG: Performed by: INTERNAL MEDICINE

## 2019-11-14 PROCEDURE — 92507 TX SP LANG VOICE COMM INDIV: CPT

## 2019-11-14 PROCEDURE — 82962 GLUCOSE BLOOD TEST: CPT

## 2019-11-14 PROCEDURE — 25010000002 HYDRALAZINE PER 20 MG: Performed by: INTERNAL MEDICINE

## 2019-11-14 RX ORDER — HYDRALAZINE HYDROCHLORIDE 20 MG/ML
10 INJECTION INTRAMUSCULAR; INTRAVENOUS ONCE
Status: COMPLETED | OUTPATIENT
Start: 2019-11-14 | End: 2019-11-14

## 2019-11-14 RX ORDER — HYDRALAZINE HYDROCHLORIDE 20 MG/ML
10 INJECTION INTRAMUSCULAR; INTRAVENOUS EVERY 6 HOURS PRN
Status: DISCONTINUED | OUTPATIENT
Start: 2019-11-14 | End: 2019-11-19 | Stop reason: HOSPADM

## 2019-11-14 RX ORDER — QUETIAPINE FUMARATE 25 MG/1
25 TABLET, FILM COATED ORAL NIGHTLY PRN
Status: DISCONTINUED | OUTPATIENT
Start: 2019-11-14 | End: 2019-11-19 | Stop reason: HOSPADM

## 2019-11-14 RX ORDER — PROMETHAZINE HYDROCHLORIDE 12.5 MG/1
12.5 SUPPOSITORY RECTAL EVERY 6 HOURS PRN
Status: DISCONTINUED | OUTPATIENT
Start: 2019-11-14 | End: 2019-11-19 | Stop reason: HOSPADM

## 2019-11-14 RX ORDER — PROMETHAZINE HYDROCHLORIDE 12.5 MG/1
12.5 TABLET ORAL EVERY 6 HOURS PRN
Status: DISCONTINUED | OUTPATIENT
Start: 2019-11-14 | End: 2019-11-19 | Stop reason: HOSPADM

## 2019-11-14 RX ORDER — LISINOPRIL 20 MG/1
20 TABLET ORAL
Status: DISCONTINUED | OUTPATIENT
Start: 2019-11-15 | End: 2019-11-15

## 2019-11-14 RX ORDER — PROMETHAZINE HYDROCHLORIDE 25 MG/ML
12.5 INJECTION, SOLUTION INTRAMUSCULAR; INTRAVENOUS EVERY 6 HOURS PRN
Status: DISCONTINUED | OUTPATIENT
Start: 2019-11-14 | End: 2019-11-19 | Stop reason: HOSPADM

## 2019-11-14 RX ADMIN — CEFEPIME 2 G: 2 INJECTION, POWDER, FOR SOLUTION INTRAVENOUS at 12:28

## 2019-11-14 RX ADMIN — HYDRALAZINE HYDROCHLORIDE 10 MG: 20 INJECTION INTRAMUSCULAR; INTRAVENOUS at 01:25

## 2019-11-14 RX ADMIN — CEFEPIME 2 G: 2 INJECTION, POWDER, FOR SOLUTION INTRAVENOUS at 03:26

## 2019-11-14 RX ADMIN — VANCOMYCIN HYDROCHLORIDE 750 MG: 750 INJECTION, SOLUTION INTRAVENOUS at 20:23

## 2019-11-14 RX ADMIN — ACETAMINOPHEN 650 MG: 650 SUPPOSITORY RECTAL at 12:28

## 2019-11-14 RX ADMIN — ONDANSETRON 4 MG: 2 INJECTION INTRAMUSCULAR; INTRAVENOUS at 14:19

## 2019-11-14 RX ADMIN — CEFTRIAXONE 2 G: 2 INJECTION, POWDER, FOR SOLUTION INTRAMUSCULAR; INTRAVENOUS at 18:19

## 2019-11-14 RX ADMIN — AMPICILLIN SODIUM 2 G: 2 INJECTION, POWDER, FOR SOLUTION INTRAMUSCULAR; INTRAVENOUS at 04:35

## 2019-11-14 RX ADMIN — ATORVASTATIN CALCIUM 80 MG: 40 TABLET, FILM COATED ORAL at 20:23

## 2019-11-14 RX ADMIN — ONDANSETRON 4 MG: 2 INJECTION INTRAMUSCULAR; INTRAVENOUS at 04:36

## 2019-11-14 RX ADMIN — AMPICILLIN SODIUM 2 G: 2 INJECTION, POWDER, FOR SOLUTION INTRAMUSCULAR; INTRAVENOUS at 08:48

## 2019-11-14 RX ADMIN — HYDRALAZINE HYDROCHLORIDE 10 MG: 20 INJECTION INTRAMUSCULAR; INTRAVENOUS at 23:13

## 2019-11-14 RX ADMIN — ACETAMINOPHEN 650 MG: 325 TABLET ORAL at 03:35

## 2019-11-14 RX ADMIN — ACYCLOVIR SODIUM 500 MG: 50 INJECTION, SOLUTION INTRAVENOUS at 09:16

## 2019-11-14 RX ADMIN — INSULIN LISPRO 2 UNITS: 100 INJECTION, SOLUTION INTRAVENOUS; SUBCUTANEOUS at 08:49

## 2019-11-14 RX ADMIN — VANCOMYCIN HYDROCHLORIDE 750 MG: 750 INJECTION, SOLUTION INTRAVENOUS at 09:12

## 2019-11-14 RX ADMIN — ACYCLOVIR SODIUM 500 MG: 50 INJECTION, SOLUTION INTRAVENOUS at 03:22

## 2019-11-14 RX ADMIN — AMPICILLIN SODIUM 2 G: 2 INJECTION, POWDER, FOR SOLUTION INTRAMUSCULAR; INTRAVENOUS at 01:58

## 2019-11-14 RX ADMIN — PROMETHAZINE HYDROCHLORIDE 12.5 MG: 25 INJECTION INTRAMUSCULAR; INTRAVENOUS at 16:11

## 2019-11-14 RX ADMIN — SODIUM CHLORIDE, PRESERVATIVE FREE 10 ML: 5 INJECTION INTRAVENOUS at 09:18

## 2019-11-14 RX ADMIN — HYDRALAZINE HYDROCHLORIDE 10 MG: 20 INJECTION INTRAMUSCULAR; INTRAVENOUS at 16:33

## 2019-11-14 NOTE — PLAN OF CARE
Problem: Patient Care Overview  Goal: Plan of Care Review  Outcome: Ongoing (interventions implemented as appropriate)   11/14/19 7799   Coping/Psychosocial   Plan of Care Reviewed With patient;family   SLP treatment completed. Will continue to address cognitive-communication deficits in diagnostic treatment. Please see note for further details and recommendations.

## 2019-11-14 NOTE — PLAN OF CARE
Problem: Patient Care Overview  Goal: Plan of Care Review  Outcome: Ongoing (interventions implemented as appropriate)   11/14/19 0900   Coping/Psychosocial   Plan of Care Reviewed With patient   OTHER   Outcome Summary Pt with initial lethargy, was oriented to self only and following commands to participate. She completed bed mobility with mod assist and max assist UB ADLS and feeding. Pt limited with c/o dizziness with EOB and brief episode of decreased alertness with EOB sitting. Pt assisted back to supine, /100. Pt with mild agitation end of session. Recommend IPR.    Plan of Care Review   Progress improving

## 2019-11-14 NOTE — PROGRESS NOTES
Continued Stay Note  [facility]     Patient Name: Aura Fischer  MRN: [id number]  Today's Date: 11/14/2019    Admit Date: 11/13/2019    Discharge Plan     Row Name 11/14/19 1324       Plan    Plan  TBD    Plan Comments  Spoke with patient at bedside. Patient is more alert today. She is wanting to go home. Son was not in the room at this time. Plan is still TBD. CM will continue to follow.    Final Discharge Disposition Code  30 - still a patient        Discharge Codes    No documentation.             Pascual Lucio RN

## 2019-11-14 NOTE — PROGRESS NOTES
"Neurology       Patient Care Team:  Jose Manuel Cabrales MD as PCP - General  Jose Manuel Cabrales MD as PCP - Family Medicine  Henderson, Enoch Magallon MD as PCP - Claims Attributed    Chief complaint encephalopathy in setting of acute febrile illness      Subjective .     History: Patient reports she feels bad today \"all over.\"  Feels pain all over.  Denies any difficulty with vision.  Sisters at bedside note that she was confused in the night, thinking nurses were trying to harm her and slept poorly.  Nauseated and vomiting intermittently overnight.  Sisters note that she had an upper respiratory illness, but had reported on Monday 11/11 that she was feeling better.    ROS: No fever or shortness of breath    Objective     Vital Signs   Blood pressure 180/84, pulse 93, temperature 98.6 °F (37 °C), temperature source Axillary, resp. rate 17, height 157.5 cm (62.01\"), weight 57.4 kg (126 lb 8 oz), SpO2 99 %.    Physical Exam:              Neuro: Elderly white woman lying in hospital bed with eyes closed, wakes/opens eyes to voice and regards, oriented to [facility] in [place], to the month and the year.  Speech is clear, fluent and non-aphasic.  Follows verbal commands well.  Attention improved.  Names family at bedside correctly, with no bradyphrenia.  Pupils 2.5 mm and reactive, VFF, EOMI, face symmetric   well and equally and wiggles feet bilaterally to command    Results Review:              EEG yesterday with mild to moderate generalized slowing and no epileptiform activity  MRI brain showed no abnormality on diffusion weighted imaging, CT perfusion and CTA without significant stenosis or ischemia.  2 mm left P-comm aneurysm  Labs reviewed, CSF glucose 92, protein 43, Gram stain with few white cells and no organisms, no growth to date  Meningitis/encephalitis panel PCR on CSF all negative  Cell count tube 4 shows 49 white cells, 27 red cells, clear and colorless.  Differential shows 94% " neutrophils, 3% lymphocytes  UA with 40 ketones moderate blood 30 protein small leukocytes 21-30 white cells 3+ bacteria and 3-6 squamous epithelial cells    Assessment/Plan     Febrile illness with encephalopathy- patient on broad-spectrum antibiotics for infection with unknown source.  CSF with normal protein, negative M/E panel, does not appear to be CNS infection.  Acyclovir discontinued.  She has been afebrile over the last 24 hours and despite some delirium last night, encephalopathy seems slightly improved overall.  Concern for ongoing systemic infection as etiology.  On Vanco and cefepime.  Would prefer to avoid the latter in patient with significant encephalopathy on admission due to risk of worsening encephalopathy or seizures.  Discussed with Dr. Kee who will change antibiotic.  Also reportedly slept poorly, with delirium and will therefore start PRN Seroquel at night.    I discussed the patients findings and my recommendations with patient, family and consulting provider    Zina Delcid MD  11/14/19  1:12 PM

## 2019-11-14 NOTE — PROGRESS NOTES
"    [facility] Medicine Services  PROGRESS NOTE    Patient Name: Aura Fischer  : 1940  MRN: [id number]    Date of Admission: 2019  Primary Care Physician: Jose Manuel Cabrales MD    Subjective   Subjective     CC: f/u AMS    HPI: Up in bed saying \"it doesn't matter.\" Doesn't participate in conversation.     Review of Systems  UTO due to AMS.      Objective   Objective     Vital Signs:   Temp:  [97.3 °F (36.3 °C)-98.8 °F (37.1 °C)] 98.6 °F (37 °C)  Heart Rate:  [65-99] 93  Resp:  [17-18] 17  BP: (162-198)/(76-99) 180/84  Total (NIH Stroke Scale): 5     Physical Exam:  Constitutional: No acute distress, awake, alert, up in bed  HENT: NCAT, mucous membranes moist  Respiratory: Clear to auscultation bilaterally, respiratory effort normal   Cardiovascular: RRR, no murmurs, rubs, or gallops, palpable pedal pulses bilaterally  Gastrointestinal: Positive bowel sounds, soft, nontender, nondistended  Musculoskeletal: No bilateral ankle edema  Psychiatric: Pleasant  Neurologic: No spontaneous conversation, inappropriately answers questions, doesn't follow commands.  Skin: No rashes    Results Reviewed:    Results from last 7 days   Lab Units 19  0524   WBC 10*3/mm3 12.97*   HEMOGLOBIN g/dL 12.4   HEMATOCRIT % 38.3   PLATELETS 10*3/mm3 293     Results from last 7 days   Lab Units 19  0524   SODIUM mmol/L 137   POTASSIUM mmol/L 3.7   CHLORIDE mmol/L 99   CO2 mmol/L 22.0   BUN mg/dL 15   CREATININE mg/dL 0.79   GLUCOSE mg/dL 184*   CALCIUM mg/dL 8.7   ALT (SGPT) U/L 31   AST (SGOT) U/L 29   TROPONIN T ng/mL <0.010     Estimated Creatinine Clearance: 52.5 mL/min (by C-G formula based on SCr of 0.79 mg/dL).    Microbiology Results Abnormal     Procedure Component Value - Date/Time    Blood Culture - Blood, Hand, Right [[id number]] Collected:  19 0533    Lab Status:  Preliminary result Specimen:  Blood from Hand, Right Updated:  19 08     Blood Culture No growth at 24 " hours    Blood Culture - Blood, Arm, Right [[id number]] Collected:  11/13/19 0542    Lab Status:  Preliminary result Specimen:  Blood from Arm, Right Updated:  11/14/19 0801     Blood Culture No growth at 24 hours    Culture, CSF - Cerebrospinal Fluid, Lumbar Puncture [[id number]] Collected:  11/13/19 1420    Lab Status:  Preliminary result Specimen:  Cerebrospinal Fluid from Lumbar Puncture Updated:  11/14/19 0711     CSF Culture No growth     Gram Stain Few (2+) WBCs seen      No organisms seen    Meningitis / Encephalitis Panel, PCR - Cerebrospinal Fluid, Lumbar Puncture [[id number]]  (Normal) Collected:  11/13/19 1420    Lab Status:  Final result Specimen:  Cerebrospinal Fluid from Lumbar Puncture Updated:  11/13/19 1628     ESCHERICHIA COLI K1, PCR Not Detected     HAEMOPHILUS INFLUENZAE, PCR Not Detected     LISTERIA MONOCYTOGENES, PCR Not Detected     NEISSERIA MENINGITIDIS, PCR Not Detected     STREPTOCOCCUS AGALACTIAE, PCR Not Detected     STREPTOCOCCUS PNEUMONIAE, PCR Not Detected     CYTOMEGALOVIRUS (CMV), PCR Not Detected     ENTEROVIRUS, PCR Not Detected     HERPES SIMPLEX VIRUS 1 (HSV-1), PCR Not Detected     HERPES SIMPLEX VIRUS 2 (HSV-2), PCR Not Detected     HUMAN PARECHOVIRUS, PCR Not Detected     VARICELLA ZOSTER VIRUS (VZV), PCR Not Detected     CRYPTOCOCCUS NEOFORMANS / GATTII, PCR Not Detected     HUMAN HERPES VIRUS 6 PCR Not Detected    Influenza A & B PCR - Swab, Nasopharynx [[id number]]  (Normal) Collected:  11/13/19 0811    Lab Status:  Final result Specimen:  Swab from Nasopharynx Updated:  11/13/19 1023     Influenza A PCR Not Detected     Influenza B PCR Not Detected        CT head personally reviewed without acute disease. Agree with interpretation.  Imaging Results (Last 24 Hours)     Procedure Component Value Units Date/Time    IR Lumbar Puncture Diag/Thera [[id number]] Collected:  11/13/19 1434     Updated:  11/13/19 1649    Narrative:       EXAMINATION: IR LUMBAR PUNCTURE DIAG/THERA-      INDICATION: fever, confusion, rule out meningitis     TECHNIQUE: 4 seconds of fluoroscopic time was used for this procedure. 1  associated image was saved. Informed consent was obtained from the  patient's son. The patient was placed in the prone position on the  table. The back was prepped and draped in a sterile fashion. 1%  lidocaine was used as a local anesthetic to the skin and subcutaneous  tissues. Under fluoroscopic guidance a 22-gauge spinal needle was  advanced at the L2-L3 level to the CSF space. Approximately 6 cc of  clear and colorless CSF was returned and collected in 4 numbered vials.  Sample vials were labeled and sent to pathology for further analysis.  The needle was removed.     COMPARISON: NONE     FINDINGS: The patient tolerated the procedure well, and no immediate  complications occurred.          Impression:       Successful fluoroscopic guided lumbar puncture as above with  no immediate complications.         This report was finalized on 11/13/2019 4:46 PM by Dr. Angus Correa MD.             Results for orders placed during the hospital encounter of 11/13/19   Adult Transthoracic Echo Complete W/ Cont if Necessary Per Protocol (With Agitated Saline)    Narrative · Normal cardiac chamber sizes and wall thicknesses.  · Global and segmental LV wall motion is normal with an estimated LV   ejection fraction >70%.  · There is mild aortic valve sclerosis. I cannot exclude mild aortic   insufficiency.  · There is mitral annular calcification and mild mitral regurgitation.  · The estimated pulmonary artery pressure is normal.  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 This has the appearance of a fibroelastoma although I   cannot exclude myxoma, organized thrombus,or leaflet calcification from   this study.  · Agitated saline study is negative for intracardiac shunt.          I have reviewed the medications:  Scheduled Meds:  [START ON  11/15/2019] Pharmacy Consult  Does not apply Once   aspirin 81 mg Oral Daily   atorvastatin 80 mg Oral Nightly   cefepime 2 g Intravenous Q8H   insulin lispro 0-7 Units Subcutaneous 4x Daily With Meals & Nightly   levothyroxine 75 mcg Oral Daily   lidocaine 5 mL Subcutaneous Once   sodium chloride 10 mL Intravenous Q12H   vancomycin 15 mg/kg Intravenous Q12H     Continuous Infusions:  hold 1 each    Pharmacy to dose vancomycin     sodium chloride 100 mL/hr Last Rate: 100 mL/hr (11/13/19 0717)     PRN Meds:.•  hold  •  acetaminophen **OR** acetaminophen  •  dextrose  •  dextrose  •  glucagon (human recombinant)  •  hydrALAZINE  •  ondansetron  •  ondansetron  •  Pharmacy to dose vancomycin  •  sodium chloride      Assessment/Plan   Assessment / Plan     Active Hospital Problems    Diagnosis  POA   • Acute metabolic encephalopathy [G93.41]  Yes   • Acute UTI (urinary tract infection) [N39.0]  Yes   • Sepsis, unspecified organism (CMS/HCC) [A41.9]  Yes   • Lactic acidosis [E87.2]  Yes   • Mixed hyperlipidemia [E78.2]  Yes   • Essential hypertension [I10]  Yes   • Acquired hypothyroidism [E03.9]  Yes      Resolved Hospital Problems   No resolved problems to display.        Brief Hospital Course to date:  Aura Fischer is a 79 y.o. female who was found down nearly 20 hours after last known well. She is found to have metabolic encephalopathy likely due to viral illness. This is my first day assessing patient's active medical issues.    A/P:  --Her encephalopathy persists and is most likely due to acute viral illness. Appears to have UTI. Her cultures are largely unremarkable at this time though she does have significant amt of WBC and neutrophils in LP. ID following, will defer deescilation of abx to them.  --Neurology following, also agree that this is likely related to above. There was initial concern for seizure activity, but darby EEG unrevealing. Holding AEDs for now.  --Her CT perfusion, CTA head and neck were  largely unremarkable. May need repeat MRI but unable to cooperate at this time. Initial MRI without ischemia.  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 May need to consider MARGARITO as well once improved.  --Her HTN is uncontrolled, but not reliably taking PO. Will add prn hydralazine in event her BP is contributing to her encephalopathy.  --PT/OT when able.    DVT Prophylaxis:  Mechanical.    Disposition: I expect the patient to be discharged TBD.    CODE STATUS:   Code Status and Medical Interventions:   Ordered at: 11/13/19 0803     Level Of Support Discussed With:    Next of Kin (If No Surrogate)     Code Status:    No CPR     Medical Interventions (Level of Support Prior to Arrest):    Full         Electronically signed by Naheed Lowe II, DO, 11/14/19, 11:49 AM.

## 2019-11-14 NOTE — THERAPY EVALUATION
Acute Care - Occupational Therapy Initial Evaluation  [facility]     Patient Name: Aura Fischer  : 1940  MRN: [id number]  Today's Date: 2019  Onset of Illness/Injury or Date of Surgery: 19  Date of Referral to OT: 19  Referring Physician: Dr. Lua    Admit Date: 2019       ICD-10-CM ICD-9-CM   1. Cognitive communication deficit R41.841 799.52   2. Impaired mobility and ADLs Z74.09 799.89     Patient Active Problem List   Diagnosis   • Rectal bleeding   • Healthcare maintenance   • Anemia   • Anxiety disorder   • Gastroesophageal reflux disease without esophagitis   • Mixed hyperlipidemia   • Essential hypertension   • Acquired hypothyroidism   • Recurrent pancreatitis   • Depression   • Osteoporosis   • Acute metabolic encephalopathy   • Acute UTI (urinary tract infection)   • Sepsis, unspecified organism (CMS/HCC)   • Lactic acidosis     Past Medical History:   Diagnosis Date   • Anemia    • Diverticulosis    • Hyperlipidemia    • Hypertension    • Internal hemorrhoid    • Mood disorder (CMS/HCC)    • Thyroid disease      Past Surgical History:   Procedure Laterality Date   • BREAST SURGERY     • CHOLECYSTECTOMY     • HEMORRHOIDECTOMY     • HYSTERECTOMY     • PANCREATECTOMY     • SPLENECTOMY            OT ASSESSMENT FLOWSHEET (last 12 hours)      Occupational Therapy Evaluation     Row Name 19 0900                   OT Evaluation Time/Intention    Subjective Information  no complaints  -AR        Document Type  evaluation  -AR        Patient Effort  good  -AR        Symptoms Noted During/After Treatment  significant change in vital signs  -AR        Comment  hypertensive , nurse aware  -AR           General Information    Patient Profile Reviewed?  yes  -AR        Onset of Illness/Injury or Date of Surgery  19  -AR        Referring Physician  Dr. Lua  -AR        Patient Observations  lethargic  -AR        Patient/Family Observations  pt supine, no family at bedside   -AR        Prior Level of Function  independent:;gait;transfer;ADL's per chart review  -AR        Equipment Currently Used at Home  -- TBA  -AR        Pertinent History of Current Functional Problem  Pt is a 79 yof found down at home. Pt found to have metabolic encephalopathy d/t viral illness.   -AR        Existing Precautions/Restrictions  fall  -AR        Risks Reviewed  patient:;LOB;nausea/vomiting;increased discomfort;dizziness;change in vital signs;increased drainage;lines disloged  -AR        Benefits Reviewed  patient:;improve function;increase independence;increase strength;increase balance;decrease risk of DVT;increase knowledge  -AR           Relationship/Environment    Primary Source of Support/Comfort  child(mariaa)  -AR        Lives With  alone  -AR           Resource/Environmental Concerns    Current Living Arrangements  home/apartment/condo  -AR           Cognitive Assessment/Interventions    Additional Documentation  Cognitive Assessment/Intervention (Group)  -AR           Cognitive Assessment/Intervention- PT/OT    Affect/Mental Status (Cognitive)  agitated initially cooperative, mild agitation end of session  -AR        Orientation Status (Cognition)  oriented to;person;disoriented to;place;situation;time  -AR        Follows Commands (Cognition)  50-74% accuracy  -AR        Cognitive Function (Cognitive)  attention deficit;executive function deficit;memory deficit;safety deficit  -AR        Executive Function Deficit (Cognition)  moderate deficit  -AR        Memory Deficit (Cognitive)  moderate deficit  -AR        Safety Deficit (Cognitive)  moderate deficit  -AR           Bed Mobility Assessment/Treatment    Bed Mobility Assessment/Treatment  scooting/bridging;supine-sit;sit-supine  -AR        Scooting/Bridging [place] (Bed Mobility)  maximum assist (25% patient effort);2 person assist  -AR        Supine-Sit [place] (Bed Mobility)  moderate assist (50% patient effort);verbal cues  -AR         Sit-Supine [place] (Bed Mobility)  moderate assist (50% patient effort);verbal cues  -AR        Comment (Bed Mobility)  Pt limited with c/o dizziness with EOB sitting, pt with brief episode of decreased LOC for approx. 5 minutes, returned to supine per pt request. RN notified.  -AR           Transfer Assessment/Treatment    Comment (Transfers)  Deferred d/t dizziness  -AR           ADL Assessment/Intervention    BADL Assessment/Intervention  upper body dressing;lower body dressing;feeding  -AR           Upper Body Dressing Assessment/Training    Upper Body Dressing [place] Level  doff;don;front opening garment;maximum assist (25% patient effort)  -AR        Upper Body Dressing Position  supine  -AR           Lower Body Dressing Assessment/Training    Lower Body Dressing [place] Level  don;socks;dependent (less than 25% patient effort)  -AR        Lower Body Dressing Position  supine  -AR           Self-Feeding Assessment/Training    [place] Level (Feeding)  liquids to mouth;dependent (less than 25% patient effort)  -AR        Position (Self-Feeding)  supine  -AR           BADL Safety/Performance    Impairments, BADL Safety/Performance  balance;cognition;trunk/postural control  -AR        Cognitive Impairments, BADL Safety/Performance  attention;impulsivity;judgment  -AR           General ROM    GENERAL ROM COMMENTS  WFL   -AR           MMT (Manual Muscle Testing)    General MMT Comments  Grossly WFL, unable to formally assess d/t cognitive status  -AR           Motor Assessment/Interventions    Additional Documentation  Balance (Group);Balance Interventions (Group);Fine Motor Testing & Training (Group)  -AR           Balance    Balance  static sitting balance;static standing balance  -AR           Static Sitting Balance    Level of [place] (Unsupported Sitting, Static Balance)  minimal assist, 75% patient effort progressed CGA  -AR        Sitting Position (Unsupported Sitting, Static  Balance)  sitting on edge of bed  -AR        Time Able to Maintain Position (Unsupported Sitting, Static Balance)  3 to 4 minutes  -AR           Fine Motor Testing & Training    Comment, Fine Motor Coordination  B opposition intact  -AR           Sensory Assessment/Intervention    Sensory General Assessment  other (see comments) unable to accurately assess  -AR        Additional Documentation  Vision Assessment/Intervention (Group)  -AR           Vision Assessment/Intervention    Visual Impairment/Limitations  WFL  -AR           Positioning and Restraints    Pre-Treatment Position  in bed  -AR        Post Treatment Position  bed  -AR           Pain Assessment    Additional Documentation  Pain Scale: Numbers Pre/Post-Treatment (Group)  -AR           Pain Scale: Numbers Pre/Post-Treatment    Pain Scale: Numbers, Pretreatment  0/10 - no pain  -AR        Pain Scale: Numbers, Post-Treatment  0/10 - no pain  -AR           Plan of Care Review    Plan of Care Reviewed With  patient  -AR           Clinical Impression (OT)    Date of Referral to OT  11/13/19  -AR        OT Diagnosis  decreased independence with ADLS  -AR        Patient/Family Goals Statement (OT Eval)  none stated  -AR        Criteria for Skilled Therapeutic Interventions Met (OT Eval)  yes;treatment indicated  -AR        Rehab Potential (OT Eval)  good, to achieve stated therapy goals  -AR        Therapy Frequency (OT Eval)  daily  -AR        Anticipated Discharge Disposition (OT)  inpatient rehabilitation facility  -AR           Vital Signs    Pre Systolic BP Rehab  183  -AR        Pre Treatment Diastolic BP  88  -AR        Post Systolic BP Rehab  203  -AR        Post Treatment Diastolic BP  100  -AR        Pretreatment Heart Rate (beats/min)  92  -AR        Posttreatment Heart Rate (beats/min)  98  -AR        Pre SpO2 (%)  96  -AR        O2 Delivery Pre Treatment  room air  -AR        Post SpO2 (%)  98  -AR        O2 Delivery Post Treatment  room air  -AR         Pre Patient Position  Supine  -AR        Intra Patient Position  Sitting  -AR        Post Patient Position  Supine  -AR           OT Goals    Transfer Goal Selection (OT)  transfer, OT goal 1  -AR        Dressing Goal Selection (OT)  dressing, OT goal 1  -AR        Self-Feeding Goal Selection (OT)  self feeding, OT goal 1  -AR        Balance Goal Selection (OT)  balance, OT goal 1  -AR        Additional Documentation  Balance Goal Selection (OT) (Row);Self-Feeding Goal Selection (OT) (Row)  -AR           Dressing Goal 1 (OT)    Activity/Assistive Device (Dressing Goal 1, OT)  upper body dressing  -AR        [place]/Cues Needed (Dressing Goal 1, OT)  moderate assist (50-74% patient effort)  -AR        Time Frame (Dressing Goal 1, OT)  short term goal (STG);1 week  -AR        Progress/Outcome (Dressing Goal 1, OT)  goal ongoing  -AR           Self-Feeding Goal 1 (OT)    Activity/Assistive Device (Self-Feeding Goal 1, OT)  self-feeding skills, all  -AR        [place] Level/Cues Needed (Self-Feeding Goal 1, OT)  minimum assist (75% or more patient effort);verbal cues required  -AR        Time Frame (Self-Feeding Goal 1, OT)  short term goal (STG);5 days  -AR        Progress/Outcomes (Self-Feeding Goal 1, OT)  goal ongoing  -AR           Balance Goal 1 (OT)    Activity/Assistive Device (Balance Goal 1, OT)  sitting, static  -AR        [place] Level/Cues Needed (Balance Goal 1, OT)  supervision required  -AR        Time Frame (Balance Goal 1, OT)  short term goal (STG);1 week  -AR        Progress/Outcomes (Balance Goal 1, OT)  goal ongoing  -AR           OT Cognitive Goals    Orientation Goal Selection (OT)  orientation, OT goal 1  -AR           Orientation Goal 1 (OT)    Activity (Orientation Goal 1, OT)  oriented x 4  -AR        [place]/Cues/Accuracy (Orientation Goal 1, OT)  minimal cues for use of strategies  -AR        Time Frame (Orientation Goal 1, OT)  short term goal (STG);1 week  -AR         Progress/Outcome (Orientation Goal 1, OT)  goal ongoing  -AR           Living Environment    Home Accessibility  -- pt unable to provide info  -AR          User Key  (r) = Recorded By, (t) = Taken By, (c) = Cosigned By    Initials Name Effective Dates    Heidi Ramirez OT 06/22/15 -          Occupational Therapy Education     Title: PT OT SLP Therapies (Done)     Topic: Occupational Therapy (Done)     Point: ADL training (Done)     Description: Instruct learner(s) on proper safety adaptation and remediation techniques during self care or transfers.   Instruct in proper use of assistive devices.    Learning Progress Summary           Patient Acceptance, E,D, VU,NR by AR at 11/14/2019  9:00 AM                   Point: Home exercise program (Done)     Description: Instruct learner(s) on appropriate technique for monitoring, assisting and/or progressing therapeutic exercises/activities.    Learning Progress Summary           Patient Acceptance, E,D, VU,NR by AR at 11/14/2019  9:00 AM                   Point: Precautions (Done)     Description: Instruct learner(s) on prescribed precautions during self-care and functional transfers.    Learning Progress Summary           Patient Acceptance, E,D, VU,NR by AR at 11/14/2019  9:00 AM                   Point: Body mechanics (Done)     Description: Instruct learner(s) on proper positioning and spine alignment during self-care, functional mobility activities and/or exercises.    Learning Progress Summary           Patient Acceptance, E,D, VU,NR by AR at 11/14/2019  9:00 AM                               User Key     Initials Effective Dates Name Provider Type Discipline    AR 06/22/15 -  Heidi Encarnacion OT Occupational Therapist OT                  OT Recommendation and Plan  Outcome Summary/Treatment Plan (OT)  Anticipated Discharge Disposition (OT): inpatient rehabilitation facility  Therapy Frequency (OT Eval): daily  Plan of Care Review  Plan of Care  Reviewed With: patient  Plan of Care Reviewed With: patient  Outcome Summary: Pt with initial lethargy, was oriented to self only and following commands to participate. She completed bed mobility with mod assist and max assist UB ADLS and feeding. Pt limited with c/o dizziness with EOB and brief episode of decreased alertness with EOB sitting. Pt assisted back to supine, /100. Pt with mild agitation end of session. Recommend IPR.     Outcome Measures     Row Name 11/14/19 0900             How much help from another is currently needed...    Putting on and taking off regular lower body clothing?  2  -AR      Bathing (including washing, rinsing, and drying)  2  -AR      Toileting (which includes using toilet bed pan or urinal)  2  -AR      Putting on and taking off regular upper body clothing  2  -AR      Taking care of personal grooming (such as brushing teeth)  2  -AR      Eating meals  2  -AR      AM-PAC 6 Clicks Score (OT)  12  -AR         Functional Assessment    Outcome Measure Options  AM-PAC 6 Clicks Daily Activity (OT)  -AR        User Key  (r) = Recorded By, (t) = Taken By, (c) = Cosigned By    Initials Name Provider Type    Heidi Ramirez OT Occupational Therapist          Time Calculation:   Time Calculation- OT     Row Name 11/14/19 0900             Time Calculation- OT    OT Start Time  0900  -AR      OT Received On  11/14/19  -AR      OT Goal Re-Cert Due Date  11/24/19  -AR        User Key  (r) = Recorded By, (t) = Taken By, (c) = Cosigned By    Initials Name Provider Type    Heidi Ramirez OT Occupational Therapist        Therapy Charges for Today     Code Description Service Date Service Provider Modifiers Qty    [id number]  OT EVAL MOD COMPLEXITY 4 11/14/2019 Heidi Encarnacion OT GO 1               Heidi Encarnacion OT  11/14/2019

## 2019-11-14 NOTE — PROGRESS NOTES
"Infectious Disease Progress Note  Aura Fischer  1940  [id number]    Date of Consult: 11/13/2019  Admission Date: 11/13/2019    Requesting Provider: Enoch Davis,*  Evaluating Physician: Jose Kee MD    Chief Complaint: confusion    Reason for Consultation: fever, confusion    History of present illness:   Patient is a 79 y.o.  Yr old female with history of HTN, HLD, thyroid disease.  She was taken to the emergency department and [place] after being found down by her family, nearly unresponsive.  She was last seen well about 20 hours prior and at that time complained of sinusitis and sore throat.  She had not taken any antibiotics.  She had been near her sister who is sick with similar symptoms.  Nobody with known influenza.  No recent travel.  No recent procedures, injections etc.  She was worked up initially in the [place] emergency department and transferred to [facility] on 11/13 for further work-up.  Febrile on arrival here up to 101.  Mental status is improved but she remains significantly confused.  She is oriented to name but answers all other questions including location and date with her son's name.  Son and daughter-in-law are at bedside who provided the history. Influenza PCR negative. WBC 12. UA pending. Started on empiric antibiotics for meningitis. Per family mental status is better in the past 24 hrs.     11/14: Afebrile overnight.  Stable confusion.  She answers all questions with her name and is not oriented.  She complains of \"pain all over.\"    Review of Systems  Unable to obtain due to confusion.         Allergies   Allergen Reactions   • Sulfa Antibiotics        Antibiotics:  Anti-Infectives (From admission, onward)    Ordered     Dose/Rate Route Frequency Start Stop    11/13/19 0702  vancomycin in dextrose 5% 150 mL (VANCOCIN) IVPB 750 mg     Ordering Provider:  Jose Lopez, [facility]    15 mg/kg × 57.4 kg  over 60 Minutes Intravenous Every 12 Hours 11/13/19 2100 " 11/16/19 0859    11/13/19 0315  cefepime (MAXIPIME) 2 g/100 mL 0.9% NS (mbp)     Ordering Provider:  Adelita Lua MD    2 g  over 4 Hours Intravenous Every 8 Hours 11/13/19 1200 11/16/19 1159    11/13/19 0815  acyclovir (ZOVIRAX) 500 mg in sodium chloride 0.9 % 100 mL IVPB     Ordering Provider:  Bruno Ocampo MD    10 mg/kg × 50.1 kg (Ideal)  over 60 Minutes Intravenous Once 11/13/19 0900 11/13/19 1042    11/13/19 0612  vancomycin 1500 mg/500 mL 0.9% NS IVPB ([facility])     Ordering Provider:  Jose Lopez RPH    25 mg/kg × 57.4 kg  over 90 Minutes Intravenous Once 11/13/19 0700 11/13/19 0948    11/13/19 0315  cefepime (MAXIPIME) 2 g/100 mL 0.9% NS (mbp)     Ordering Provider:  Adelita Lua MD    2 g  200 mL/hr over 30 Minutes Intravenous Once 11/13/19 0415 11/13/19 0804    11/13/19 0315  Pharmacy to dose vancomycin     Ordering Provider:  Adelita Lua MD     Does not apply Continuous PRN 11/13/19 0312 11/16/19 0311          Other Medications:  Current Facility-Administered Medications   Medication Dose Route Frequency Provider Last Rate Last Dose   • ! Hold anticoagulants 24hr prior to lumbar puncture  1 each Does not apply Continuous PRN Jose Kee MD       • [START ON 11/15/2019] ! Vancomycin trough 11/15 at 0830.  Please hold vancomycin dose 11/15 at 0900 until pharmacy can evaluate level   Does not apply Once Jose Lopez RPH       • acetaminophen (TYLENOL) tablet 650 mg  650 mg Oral Q4H PRN Day, Adelita PINEDA MD   650 mg at 11/14/19 0849    Or   • acetaminophen (TYLENOL) suppository 650 mg  650 mg Rectal Q4H PRN Day, Adelita PINEDA MD       • aspirin chewable tablet 81 mg  81 mg Oral Daily Day, Adelita PINEDA MD   81 mg at 11/14/19 0849   • atorvastatin (LIPITOR) tablet 80 mg  80 mg Oral Nightly Day, Adelita PINEDA MD   80 mg at 11/13/19 2206   • cefepime (MAXIPIME) 2 g/100 mL 0.9% NS (mbp)  2 g Intravenous Q8H Day, Adelita PINEDA MD   2 g at 11/14/19 0326   • dextrose (D50W) 25 g/ 50mL Intravenous  "Solution 25 g  25 g Intravenous Q15 Min PRN Bruno Ocampo MD       • dextrose (GLUTOSE) oral gel 15 g  15 g Oral Q15 Min PRN Bruno Ocampo MD       • glucagon (human recombinant) (GLUCAGEN DIAGNOSTIC) injection 1 mg  1 mg Subcutaneous Q15 Min PRN Bruno Ocampo MD       • hydrALAZINE (APRESOLINE) injection 10 mg  10 mg Intravenous Q6H PRN Naheed Lowe II, DO       • insulin lispro (humaLOG) injection 0-7 Units  0-7 Units Subcutaneous 4x Daily With Meals & Nightly DossetBruno garcia MD   2 Units at 11/14/19 0849   • levothyroxine (SYNTHROID, LEVOTHROID) tablet 75 mcg  75 mcg Oral Daily Day, Adelita PINEDA MD       • lidocaine (XYLOCAINE) 1 % injection 5 mL  5 mL Subcutaneous Once Vidal Correa MD       • ondansetron (ZOFRAN) injection 4 mg  4 mg Intravenous Q6H PRN Susanne Allen APRN   4 mg at 11/14/19 0436   • ondansetron (ZOFRAN) tablet 4 mg  4 mg Oral Once PRN Jose Kee MD       • Pharmacy to dose vancomycin   Does not apply Continuous PRN Day, Adelita PINEDA MD       • sodium chloride 0.9 % flush 10 mL  10 mL Intravenous Q12H Day, Adelita PINEDA MD   10 mL at 11/14/19 0918   • sodium chloride 0.9 % flush 10 mL  10 mL Intravenous PRN Day, Adelita PINEDA MD       • sodium chloride 0.9 % infusion  100 mL/hr Intravenous Continuous Day, Adelita PINEDA  mL/hr at 11/13/19 0717 100 mL/hr at 11/13/19 0717   • vancomycin in dextrose 5% 150 mL (VANCOCIN) IVPB 750 mg  15 mg/kg Intravenous Q12H Jose Lopez, [facility]   750 mg at 11/14/19 0912       Physical Exam:   Vital Signs   /84 (BP Location: Right arm, Patient Position: Lying)   Pulse 93   Temp 98.6 °F (37 °C) (Axillary)   Resp 17   Ht 157.5 cm (62.01\")   Wt 57.4 kg (126 lb 8 oz)   SpO2 99%   BMI 23.13 kg/m²     GENERAL: Awake and alert, confused, oriented to name only.   HEENT: Normocephalic, atraumatic.  PERRL. EOMI. No conjunctival injection. No icterus.  NECK: Supple without nuchal rigidity or meningismus. No mass.  LYMPH: No cervical " lymphadenopathy.  HEART: RRR; No murmur.  LUNGS: Clear to auscultation bilaterally without wheezing, rales, rhonchi. Normal respiratory effort. Nonlabored.  ABDOMEN: Soft, non-tender today, nondistended. Positive bowel sounds. No rebound or guarding.  EXT:  No cyanosis, clubbing or edema.  : Without Whitehead catheter.  MSK: FROM without joint effusions noted arms/legs.    SKIN: Warm and dry without cutaneous eruptions on Inspection/palpation.    NEURO: Oriented to person only. No focal deficits on motor/sensory exam at arms/legs.  PSYCHIATRIC: confused but cooperative.     Laboratory Data    Results from last 7 days   Lab Units 11/13/19  0524   WBC 10*3/mm3 12.97*   HEMOGLOBIN g/dL 12.4   HEMATOCRIT % 38.3   PLATELETS 10*3/mm3 293     Results from last 7 days   Lab Units 11/13/19  0524   SODIUM mmol/L 137   POTASSIUM mmol/L 3.7   CHLORIDE mmol/L 99   CO2 mmol/L 22.0   BUN mg/dL 15   CREATININE mg/dL 0.79   GLUCOSE mg/dL 184*   CALCIUM mg/dL 8.7     Estimated Creatinine Clearance: 52.5 mL/min (by C-G formula based on SCr of 0.79 mg/dL).  Results from last 7 days   Lab Units 11/13/19  0524   ALK PHOS U/L 106   BILIRUBIN mg/dL 0.7   ALT (SGPT) U/L 31   AST (SGOT) U/L 29               Microbiology:   11/13 blood cultures pending, NGTD  Influenza PCR negative   UA positive, culture pending  CSF culture with Gram stain negative, culture pending  Meningitis/encephalitis panel is negative    Radiology:  Ct Angiogram Neck    Result Date: 11/13/2019  1. Densely calcified plaque at the bilateral carotid bifurcations. By NASCET criteria there is 0% diameter stenosis on the right and approximately 30% diameter stenosis on the left. (Degree of estimated stenosis on the left is a change from the preliminary report; please note the degree of stenosis is difficult to accurately estimate given the density of calcified plaque.) 2. Both vertebral arteries are patent and codominant. 3. No intracranial vascular cutoff or focal central  stenosis allowing for hypoplastic right A1 vessel. 4. 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 Signer Name: Avelina Freitas MD  Signed: 11/13/2019 8:55 AM  Workstation Name: [id number]  Radiology [facility]    Mri Brain Without Contrast    Result Date: 11/13/2019  1. Incomplete study limited to only diffusion-weighted imaging. 2. No gross evidence of acute ischemia or other restricted diffusion. 3. Recommend CT imaging if not already performed. Signer Name: Pascual Sandy MD  Signed: 11/13/2019 5:02 AM  Workstation Name: [facility]    Xr Chest 1 View    Result Date: 11/13/2019  No active disease. Cardiomegaly. Signer Name: Pascual Sandy MD  Signed: 11/13/2019 4:11 AM  Workstation Name: [facility]    Ir Lumbar Puncture Diag/thera    Result Date: 11/13/2019  Successful fluoroscopic guided lumbar puncture as above with no immediate complications.    This report was finalized on 11/13/2019 4:46 PM by Dr. Angus Correa MD.      Ct Angiogram Head    Result Date: 11/13/2019  1. Densely calcified plaque at the bilateral carotid bifurcations. By NASCET criteria there is 0% diameter stenosis on the right and approximately 30% diameter stenosis on the left. (Degree of estimated stenosis on the left is a change from the preliminary report; please note the degree of stenosis is difficult to accurately estimate given the density of calcified plaque.) 2. Both vertebral arteries are patent and codominant. 3. No intracranial vascular cutoff or focal central stenosis allowing for hypoplastic right A1 vessel. 4. 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 Signer Name: Avelina Freitas MD  Signed: 11/13/2019 8:55 AM  Workstation Name: [id number]  Radiology [facility]    Ct Cerebral Perfusion With & Without Contrast    Result Date: 11/13/2019  No focal area of acute ischemia  identified. 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 Fever, sepsis: source still not entirely clear yet, although significant pyuria on UA makes severe UTI more likely. Urine culture pending. She has a mild pleocytosis on CSF with neutrophilic predominance but normal protein and elevated glucose so not classic for bacterial meningitis. Biofire negative. Seems more likely to be reactive to some other acute process. Blood cx NGTD. CXR clear. Influenza PCR negative. + sick contact with viral URI suggests URI, but usually that does not make patients this confused, so I suspect something else is going on. No recent procedures, wounds, etc.     2. Acute encephalopathy: found down. Head imaging negative for CVA so far, although CVA not entirely ruled out. EEG negative. Neurology following. No improvement over past 24 hrs   3. Leukocytosis, no differential on CBC yet  4. RLQ discomfort: improved UTI, Diverticulitis, appendicitis?  5. MV abnormality: seen on TTE. Fibroelastoma? Will have to look more closely for endocarditis if blood cx turn positive   6. Fecal incontinence when found down: loose stools but no diarrhea per RN  7. 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  8. HTN  9. HLD  10. Hx of thyroid disease: TSH ok    PLAN:    - f/u any pending cultures done in [place]. Will attempt to call again tomorrow.   - f/u pending blood cx and urine cx at [facility]  - trend CBC w/diff and BMP    - no evidence of HSV. Stop acyclovir  - biofire negative for listeria. Stop ampicillin  - continue IV vancomycin for now. Pharmacy to assist with vancomycin dosing and medication monitoring to limit risk of toxicity  - continue IV coverage for possible UTI. Discussed with Dr Delcid: will switch to ceftriaxone to avoid possibility  for lowering seizure threshold and possible encephalopathy side effect.      Complex case.  I will follow closely.  Discussed with family at bedside.     Jose Kee MD  11/14/2019

## 2019-11-14 NOTE — THERAPY TREATMENT NOTE
Acute Care - Speech Language Pathology Treatment Note  [facility]     Patient Name: Aura Fischer  : 1940  MRN: [id number]  Today's Date: 2019         Admit Date: 2019    Visit Dx:      ICD-10-CM ICD-9-CM   1. Acute metabolic encephalopathy G93.41 348.31   2. Cognitive communication deficit R41.841 799.52   3. Impaired mobility and ADLs Z74.09 799.89     Patient Active Problem List   Diagnosis   • Rectal bleeding   • Healthcare maintenance   • Anemia   • Anxiety disorder   • Gastroesophageal reflux disease without esophagitis   • Mixed hyperlipidemia   • Essential hypertension   • Acquired hypothyroidism   • Recurrent pancreatitis   • Depression   • Osteoporosis   • Acute metabolic encephalopathy   • Acute UTI (urinary tract infection)   • Sepsis, unspecified organism (CMS/HCC)   • Lactic acidosis        Therapy Treatment  Rehabilitation Treatment Summary     Row Name 19 1530             Treatment Time/Intention    Discipline  speech language pathologist  (Pended)   -CW      Document Type  therapy note (daily note)  (Pended)   -CW      Subjective Information  no complaints  (Pended)   -CW      Mode of Treatment  speech-language pathology  (Pended)   -CW      Patient/Family Observations  Sisters present  (Pended)   -CW      Care Plan Review  evaluation/treatment results reviewed;care plan/treatment goals reviewed;risks/benefits reviewed;current/potential barriers reviewed;patient/other agree to care plan  (Pended)   -CW      Care Plan Review, Other Participant(s)  family  (Pended)   -CW      Therapy Frequency (SLP SLC)  5 days per week  (Pended)   -CW      Patient Effort  good  (Pended)   -CW      Recorded by [CW] Kenisha Lopez, Speech Therapy Student 19 1555      Row Name 19 1538             Pain Assessment    Additional Documentation  Pain Scale: Numbers Pre/Post-Treatment (Group)  (Pended)   -CW      Recorded by [CW] Kenisha Lopez, Speech Therapy Student 19  1555      Row Name 11/14/19 1530             Pain Scale: Numbers Pre/Post-Treatment    Pain Scale: Numbers, Pretreatment  0/10 - no pain  (Pended)   -CW      Pain Scale: Numbers, Post-Treatment  0/10 - no pain  (Pended)   -CW      Recorded by [CW] Kenisha Lopez, Speech Therapy Student 11/14/19 1558      Row Name 11/14/19 1530             Outcome Summary/Treatment Plan (SLP)    Daily Summary of Progress (SLP)  progress toward functional goals is good  (Pended)   -CW      Plan for Continued Treatment (SLP)  Pt met all current goals. RN reported she was paranoid and less cooperative today, but participated well in treatment. Pt and family report pt still feeling confused w/ some word-finding difficulties. Will update goals appropriately and continue to follow-up in dx/tx. Will monitor cognition and add goals as appropriate.   (Pended)   -CW2      Anticipated Dischage Disposition  unknown;anticipate therapy at next level of care  (Pended)   -CW      Recorded by [CW] Kenisha Lopez, Speech Therapy Student 11/14/19 1558  [CW2] Kenisha Lopez, Speech Therapy Student 11/14/19 1612        User Key  (r) = Recorded By, (t) = Taken By, (c) = Cosigned By    Initials Name Effective Dates Discipline     Kenisha Lopez, Speech Therapy Student 08/19/19 -  SLP          EDUCATION  The patient has been educated in the following areas:   Cognitive Impairment Communication Impairment.    SLP Recommendation and Plan  Daily Summary of Progress (SLP): (P) progress toward functional goals is good     Plan for Continued Treatment (SLP): (P) Pt met all current goals. RN reported she was paranoid and less cooperative today, but participated well in treatment. Pt and family report pt still feeling confused w/ some word-finding difficulties. Will update goals appropriately and continue to follow-up in dx/tx. Will monitor cognition and add goals as appropriate.   Anticipated Dischage Disposition: (P) unknown, anticipate therapy at  next level of care             SLP GOALS     Row Name 11/14/19 1530 11/13/19 1030          Words/Phrases/Sentences Goal 1 (SLP)    Improve Ability to Comprehend Words/Phrases/Sentences Through: Goal 1 (SLP)  identify objects, field of;identify body part;80%;with minimal cues (75-90%)  (Pended)   -CW  identify objects, field of;identify body part;80%;with minimal cues (75-90%) 2  -RD     Time Frame (Identify Objects and Pictures Goal 1, SLP)  short term goal (STG);by discharge  (Pended)   -CW  short term goal (STG);by discharge  -RD     Progress (Ability to Contruct Words/Phrases/Sentences Goal 1, SLP)  100%;independently (over 90% accuracy)  (Pended)   -CW  --     Progress/Outcomes (Identify Objects and Pictures Goal 1, SLP)  goal met  (Pended)   -CW  --        Comprehend Questions Goal 1 (SLP)    Improve Ability to Comprehend Questions Goal 1 (SLP)  complex yes/no questions;complex wh questions;80%;with minimal cues (75-90%)  (Pended)   -CW  simple yes/no questions;simple wh questions;80%;with minimal cues (75-90%)  -RD     Time Frame (Comprehend Questions Goal 1, SLP)  short term goal (STG);by discharge  (Pended)   -CW  short term goal (STG);by discharge  -RD     Barriers (Comprehend Questions Goal 1, SLP)  Pt met goals for simple Y/N and Wh questions.   (Pended)   -CW  --        Follow Directions Goal 2 (SLP)    Improve Ability to Follow Directions Goal 1 (SLP)  1 step direction with objects;1 step direction without objects;80%;with minimal cues (75-90%)  (Pended)   -CW  1 step direction with objects;1 step direction without objects;80%;with minimal cues (75-90%)  -RD     Time Frame (Follow Directions Goal 1, SLP)  short term goal (STG);by discharge  (Pended)   -CW  short term goal (STG);by discharge  -RD     Progress/Outcomes (Follow Directions Goal 1, SLP)  goal ongoing  (Pended)   -CW  --        Word Retrieval Skills Goal 1 (SLP)    Improve Word Retrieval Skills By Goal 1 (SLP)  complex;responsive naming  task;completing a divergent task;completing a convergent task;with minimal cues (75-90%)  (Pended)   -CW  completing automatic speech task, counting;completing automatic speech task, alphabet;completing automatic speech task, days of the week;responsive naming task;simple;answer WH question with one word;80%  -RD     Time Frame (Word Retrieval Goal 1, SLP)  short term goal (STG);by discharge  (Pended)   -CW  short term goal (STG);by discharge  -RD     Barriers (Word Retrieval Goal 1, SLP)  Met goal for simple responsive naming, automatic speech task, and answering question w/ one word w/ 100% accuracy independently.   (Pended)   -CW  --        Additional Goal 1 (SLP)    Additional Goal 1, SLP  LTG: Pt will improve cognitive-communication skills to actively participate in care w/ 100% accuracy w/o cues  (Pended)   -CW  LTG: Pt will improve cognitive-communication skills to actively participate in care w/ 100% accuracy w/o cues  -RD     Time Frame (Additional Goal 1, SLP)  by discharge  (Pended)   -CW  by discharge  -RD     Progress/Outcomes (Additional Goal 1, SLP)  good progress toward goal  (Pended)   -CW  --       User Key  (r) = Recorded By, (t) = Taken By, (c) = Cosigned By    Initials Name Provider Type    Fara Vitale MS CCC-SLP Speech and Language Pathologist    Kenisha Cardoso, Speech Therapy Student Speech Therapy Student              Time Calculation:     Time Calculation- SLP     Row Name 11/14/19 1608             Time Calculation- SLP    SLP Start Time  1530  (Pended)   -CW      SLP Received On  11/14/19  (Pended)   -CW        User Key  (r) = Recorded By, (t) = Taken By, (c) = Cosigned By    Initials Name Provider Type    Kenisha Cardoso, Speech Therapy Student Speech Therapy Student          Therapy Charges for Today     Code Description Service Date Service Provider Modifiers Qty    [id number]  ST TREATMENT SPEECH 3 11/14/2019 Kenisha Lopez, Speech Therapy Student GN 1                      Kenisha Lopez, Speech Therapy Student  11/14/2019

## 2019-11-14 NOTE — PLAN OF CARE
Problem: Patient Care Overview  Goal: Plan of Care Review  Outcome: Ongoing (interventions implemented as appropriate)   11/14/19 0602   Coping/Psychosocial   Plan of Care Reviewed With patient   OTHER   Outcome Summary Pt remains very confused and getting agitated. Pt has a cough, lungs are clear and equal bilaterally. Pt is nauseas and is vomitting intermittently. Pt pulled out right AC IV and a 20G was placed in her right forearm. Pt remains NSR on tele. Will continue to monitor.    Plan of Care Review   Progress no change

## 2019-11-14 NOTE — PLAN OF CARE
Problem: Patient Care Overview  Goal: Plan of Care Review  Outcome: Ongoing (interventions implemented as appropriate)   11/14/19 3704   Coping/Psychosocial   Plan of Care Reviewed With patient   OTHER   Outcome Summary pt has been confused-at times pleasantly and this morning was agitated more (pulling off telemetry); Has since calmed; family at bedside; decreased appetite; IV abx going; has vomited a few times-IV zofran and phenergen given; bp elevated-prn hydralazine given. turned q2; purewick in place.    Plan of Care Review   Progress no change     Goal: Individualization and Mutuality  Outcome: Ongoing (interventions implemented as appropriate)    Goal: Discharge Needs Assessment  Outcome: Ongoing (interventions implemented as appropriate)    Goal: Interprofessional Rounds/Family Conf  Outcome: Ongoing (interventions implemented as appropriate)      Problem: Fall Risk (Adult)  Goal: Identify Related Risk Factors and Signs and Symptoms  Outcome: Ongoing (interventions implemented as appropriate)    Goal: Absence of Fall  Outcome: Ongoing (interventions implemented as appropriate)      Problem: Skin Injury Risk (Adult)  Goal: Identify Related Risk Factors and Signs and Symptoms  Outcome: Ongoing (interventions implemented as appropriate)    Goal: Skin Health and Integrity  Outcome: Ongoing (interventions implemented as appropriate)      Problem: Confusion, Acute (Adult)  Goal: Identify Related Risk Factors and Signs and Symptoms  Outcome: Ongoing (interventions implemented as appropriate)    Goal: Cognitive/Functional Impairments Minimized  Outcome: Ongoing (interventions implemented as appropriate)    Goal: Safety  Outcome: Ongoing (interventions implemented as appropriate)

## 2019-11-15 ENCOUNTER — APPOINTMENT (OUTPATIENT)
Dept: ULTRASOUND IMAGING | Facility: HOSPITAL | Age: 79
End: 2019-11-15

## 2019-11-15 LAB
ANION GAP SERPL CALCULATED.3IONS-SCNC: 16 MMOL/L (ref 5–15)
BASOPHILS # BLD AUTO: 0.02 10*3/MM3 (ref 0–0.2)
BASOPHILS NFR BLD AUTO: 0.2 % (ref 0–1.5)
BUN BLD-MCNC: 12 MG/DL (ref 8–23)
BUN/CREAT SERPL: 16.2 (ref 7–25)
CALCIUM SPEC-SCNC: 7.9 MG/DL (ref 8.6–10.5)
CHLORIDE SERPL-SCNC: 101 MMOL/L (ref 98–107)
CO2 SERPL-SCNC: 20 MMOL/L (ref 22–29)
CREAT BLD-MCNC: 0.74 MG/DL (ref 0.57–1)
DEPRECATED RDW RBC AUTO: 47.7 FL (ref 37–54)
EOSINOPHIL # BLD AUTO: 0.01 10*3/MM3 (ref 0–0.4)
EOSINOPHIL NFR BLD AUTO: 0.1 % (ref 0.3–6.2)
ERYTHROCYTE [DISTWIDTH] IN BLOOD BY AUTOMATED COUNT: 13.7 % (ref 12.3–15.4)
GFR SERPL CREATININE-BSD FRML MDRD: 76 ML/MIN/1.73
GLUCOSE BLD-MCNC: 178 MG/DL (ref 65–99)
GLUCOSE BLDC GLUCOMTR-MCNC: 105 MG/DL (ref 70–130)
GLUCOSE BLDC GLUCOMTR-MCNC: 109 MG/DL (ref 70–130)
GLUCOSE BLDC GLUCOMTR-MCNC: 120 MG/DL (ref 70–130)
GLUCOSE BLDC GLUCOMTR-MCNC: 128 MG/DL (ref 70–130)
HCT VFR BLD AUTO: 39.1 % (ref 34–46.6)
HGB BLD-MCNC: 12.9 G/DL (ref 12–15.9)
IMM GRANULOCYTES # BLD AUTO: 0.07 10*3/MM3 (ref 0–0.05)
IMM GRANULOCYTES NFR BLD AUTO: 0.5 % (ref 0–0.5)
LYMPHOCYTES # BLD AUTO: 1.28 10*3/MM3 (ref 0.7–3.1)
LYMPHOCYTES NFR BLD AUTO: 9.7 % (ref 19.6–45.3)
MCH RBC QN AUTO: 31.4 PG (ref 26.6–33)
MCHC RBC AUTO-ENTMCNC: 33 G/DL (ref 31.5–35.7)
MCV RBC AUTO: 95.1 FL (ref 79–97)
MONOCYTES # BLD AUTO: 1.46 10*3/MM3 (ref 0.1–0.9)
MONOCYTES NFR BLD AUTO: 11.1 % (ref 5–12)
NEUTROPHILS # BLD AUTO: 10.37 10*3/MM3 (ref 1.7–7)
NEUTROPHILS NFR BLD AUTO: 78.4 % (ref 42.7–76)
NRBC BLD AUTO-RTO: 0 /100 WBC (ref 0–0.2)
PLAT MORPH BLD: NORMAL
PLATELET # BLD AUTO: 337 10*3/MM3 (ref 140–450)
PMV BLD AUTO: 12.1 FL (ref 6–12)
POTASSIUM BLD-SCNC: 2.9 MMOL/L (ref 3.5–5.2)
RBC # BLD AUTO: 4.11 10*6/MM3 (ref 3.77–5.28)
RBC MORPH BLD: NORMAL
SODIUM BLD-SCNC: 137 MMOL/L (ref 136–145)
WBC MORPH BLD: NORMAL
WBC NRBC COR # BLD: 13.21 10*3/MM3 (ref 3.4–10.8)

## 2019-11-15 PROCEDURE — 99232 SBSQ HOSP IP/OBS MODERATE 35: CPT | Performed by: INTERNAL MEDICINE

## 2019-11-15 PROCEDURE — 85007 BL SMEAR W/DIFF WBC COUNT: CPT | Performed by: INTERNAL MEDICINE

## 2019-11-15 PROCEDURE — 92507 TX SP LANG VOICE COMM INDIV: CPT

## 2019-11-15 PROCEDURE — 99232 SBSQ HOSP IP/OBS MODERATE 35: CPT | Performed by: PSYCHIATRY & NEUROLOGY

## 2019-11-15 PROCEDURE — 80048 BASIC METABOLIC PNL TOTAL CA: CPT

## 2019-11-15 PROCEDURE — 85025 COMPLETE CBC W/AUTO DIFF WBC: CPT | Performed by: INTERNAL MEDICINE

## 2019-11-15 PROCEDURE — 97162 PT EVAL MOD COMPLEX 30 MIN: CPT

## 2019-11-15 PROCEDURE — 84132 ASSAY OF SERUM POTASSIUM: CPT | Performed by: INTERNAL MEDICINE

## 2019-11-15 PROCEDURE — 25010000002 CEFTRIAXONE PER 250 MG: Performed by: INTERNAL MEDICINE

## 2019-11-15 PROCEDURE — 97535 SELF CARE MNGMENT TRAINING: CPT

## 2019-11-15 PROCEDURE — 25010000002 HYDRALAZINE PER 20 MG: Performed by: INTERNAL MEDICINE

## 2019-11-15 PROCEDURE — 76775 US EXAM ABDO BACK WALL LIM: CPT

## 2019-11-15 PROCEDURE — 82962 GLUCOSE BLOOD TEST: CPT

## 2019-11-15 RX ORDER — FAMOTIDINE 20 MG/1
20 TABLET, FILM COATED ORAL
Status: DISCONTINUED | OUTPATIENT
Start: 2019-11-15 | End: 2019-11-19 | Stop reason: HOSPADM

## 2019-11-15 RX ORDER — POTASSIUM CHLORIDE 7.45 MG/ML
10 INJECTION INTRAVENOUS
Status: DISCONTINUED | OUTPATIENT
Start: 2019-11-15 | End: 2019-11-19 | Stop reason: HOSPADM

## 2019-11-15 RX ORDER — LISINOPRIL 20 MG/1
20 TABLET ORAL EVERY 12 HOURS SCHEDULED
Status: DISCONTINUED | OUTPATIENT
Start: 2019-11-15 | End: 2019-11-19 | Stop reason: HOSPADM

## 2019-11-15 RX ORDER — PANTOPRAZOLE SODIUM 40 MG/1
40 TABLET, DELAYED RELEASE ORAL
Status: DISCONTINUED | OUTPATIENT
Start: 2019-11-15 | End: 2019-11-19 | Stop reason: HOSPADM

## 2019-11-15 RX ORDER — POTASSIUM CHLORIDE 1.5 G/1.77G
40 POWDER, FOR SOLUTION ORAL AS NEEDED
Status: DISCONTINUED | OUTPATIENT
Start: 2019-11-15 | End: 2019-11-19 | Stop reason: HOSPADM

## 2019-11-15 RX ORDER — MAGNESIUM SULFATE HEPTAHYDRATE 40 MG/ML
2 INJECTION, SOLUTION INTRAVENOUS AS NEEDED
Status: DISCONTINUED | OUTPATIENT
Start: 2019-11-15 | End: 2019-11-19 | Stop reason: HOSPADM

## 2019-11-15 RX ORDER — SACCHAROMYCES BOULARDII 250 MG
250 CAPSULE ORAL DAILY
Status: DISCONTINUED | OUTPATIENT
Start: 2019-11-15 | End: 2019-11-19 | Stop reason: HOSPADM

## 2019-11-15 RX ORDER — POTASSIUM CHLORIDE 750 MG/1
40 CAPSULE, EXTENDED RELEASE ORAL AS NEEDED
Status: DISCONTINUED | OUTPATIENT
Start: 2019-11-15 | End: 2019-11-19 | Stop reason: HOSPADM

## 2019-11-15 RX ORDER — MAGNESIUM SULFATE HEPTAHYDRATE 40 MG/ML
4 INJECTION, SOLUTION INTRAVENOUS AS NEEDED
Status: DISCONTINUED | OUTPATIENT
Start: 2019-11-15 | End: 2019-11-19 | Stop reason: HOSPADM

## 2019-11-15 RX ORDER — FLUOXETINE 10 MG/1
10 CAPSULE ORAL DAILY
Status: DISCONTINUED | OUTPATIENT
Start: 2019-11-15 | End: 2019-11-19 | Stop reason: HOSPADM

## 2019-11-15 RX ADMIN — PANTOPRAZOLE SODIUM 40 MG: 40 TABLET, DELAYED RELEASE ORAL at 10:54

## 2019-11-15 RX ADMIN — SODIUM CHLORIDE, PRESERVATIVE FREE 10 ML: 5 INJECTION INTRAVENOUS at 09:54

## 2019-11-15 RX ADMIN — ACETAMINOPHEN 650 MG: 325 TABLET ORAL at 08:31

## 2019-11-15 RX ADMIN — LEVOTHYROXINE SODIUM 75 MCG: 75 TABLET ORAL at 05:06

## 2019-11-15 RX ADMIN — FAMOTIDINE 20 MG: 20 TABLET ORAL at 16:46

## 2019-11-15 RX ADMIN — ASPIRIN 81 MG 81 MG: 81 TABLET ORAL at 08:31

## 2019-11-15 RX ADMIN — HYDRALAZINE HYDROCHLORIDE 10 MG: 20 INJECTION INTRAMUSCULAR; INTRAVENOUS at 12:12

## 2019-11-15 RX ADMIN — Medication 250 MG: at 10:54

## 2019-11-15 RX ADMIN — POTASSIUM CHLORIDE 40 MEQ: 1.5 POWDER, FOR SOLUTION ORAL at 12:08

## 2019-11-15 RX ADMIN — HYDRALAZINE HYDROCHLORIDE 10 MG: 20 INJECTION INTRAMUSCULAR; INTRAVENOUS at 21:49

## 2019-11-15 RX ADMIN — ATORVASTATIN CALCIUM 80 MG: 40 TABLET, FILM COATED ORAL at 20:17

## 2019-11-15 RX ADMIN — POTASSIUM CHLORIDE 40 MEQ: 750 CAPSULE, EXTENDED RELEASE ORAL at 12:27

## 2019-11-15 RX ADMIN — FLUOXETINE 10 MG: 10 CAPSULE ORAL at 16:46

## 2019-11-15 RX ADMIN — POTASSIUM CHLORIDE 40 MEQ: 750 CAPSULE, EXTENDED RELEASE ORAL at 20:17

## 2019-11-15 RX ADMIN — LISINOPRIL 20 MG: 20 TABLET ORAL at 08:31

## 2019-11-15 RX ADMIN — POTASSIUM CHLORIDE 40 MEQ: 750 CAPSULE, EXTENDED RELEASE ORAL at 16:46

## 2019-11-15 RX ADMIN — LIDOCAINE HYDROCHLORIDE: 20 SOLUTION ORAL; TOPICAL at 12:08

## 2019-11-15 RX ADMIN — LISINOPRIL 20 MG: 20 TABLET ORAL at 20:17

## 2019-11-15 RX ADMIN — CEFTRIAXONE 2 G: 2 INJECTION, POWDER, FOR SOLUTION INTRAMUSCULAR; INTRAVENOUS at 08:31

## 2019-11-15 RX ADMIN — ACETAMINOPHEN 650 MG: 325 TABLET ORAL at 16:46

## 2019-11-15 NOTE — DISCHARGE PLACEMENT REQUEST
"aCitlin Veliz (79 y.o. Female)   Valentin Lucio, RN Case Manager  [phone]  Patient needs inpatient rehab    Date of Birth Social Security Number Address Home Phone MRN    1940  [facility] [address] [phone] [id number]    Alevism Marital Status          Orthodox        Admission Date Admission Type Admitting Provider Attending Provider Department, Room/Bed    11/13/19 Urgent Naheed Lowe II, DO Naheed Lowe II,  [facility] 3G, S367/1    Discharge Date Discharge Disposition Discharge Destination                       Attending Provider:  Naheed Lowe II, DO    Allergies:  Sulfa Antibiotics    Isolation:  None   Infection:  None   Code Status:  No CPR    Ht:  157.5 cm (62.01\")   Wt:  57.4 kg (126 lb 8 oz)    Admission Cmt:  None   Principal Problem:  None                Active Insurance as of 11/13/2019     Primary Coverage     Payor Plan Insurance Group Employer/Plan Group    MEDICARE MEDICARE A & B      Payor Plan Address Payor Plan Phone Number Payor Plan Fax Number Effective Dates    [address] [phone]  3/1/2006 - None Entered    [facility] [id number]       Subscriber Name Subscriber Birth Date Member ID       CAITLIN VELIZ 1940 [id number]           Secondary Coverage     Payor Plan Insurance Group Employer/Plan Group    [place]      Payor Plan Address Payor Plan Phone Number Payor Plan Fax Number Effective Dates    ATTN CLAIMS DEPT [phone]  1/1/2016 - None Entered    [address] [place] [id number]       Subscriber Name Subscriber Birth Date Member ID       CAITLIN VELIZ 1940 [id number]                 Emergency Contacts      (Rel.) Home Phone Work Phone Mobile Phone    Nicolas Veliz (Son) [phone] -- --    Gabriella Mijares (Sister) -- -- [phone]               History & Physical      Day, Adelita PINEDA MD at 11/13/19 0317              [facility] " Hospital Medicine Services  HISTORY AND PHYSICAL    Patient Name: Aura Fischer  : 1940  MRN: [id number]  Primary Care Physician: Jose Manuel Cabrales MD  Date of admission: 2019      Subjective   Subjective     Chief Complaint:  confusion    HPI:  Aura Fischer is a 79 y.o. female who was transferred here from [place] for confusion was last known well on  pm and was found down in home  evening.  Son states he had spoken w/ her the night before and that she was doing well and was in her normal state of health living alone and very independent.  Pt was found unresponsive in bed w/ fecal incontinence.  Per son, pt has not improved at all since that time and has garbled speech and stares off.  Reportedly pt was able to tell nurse her name and answer simple questions but not always correctly.  Fevers started today as far as son knows.  No prior hx of anything like this.      Review of Systems    unable to obtain    All other systems reviewed and are negative.     Personal History     Past Medical History:   Diagnosis Date   • Anemia    • Diverticulosis    • Hyperlipidemia    • Hypertension    • Internal hemorrhoid    • Mood disorder (CMS/HCC)    • Thyroid disease        Past Surgical History:   Procedure Laterality Date   • CHOLECYSTECTOMY     • HEMORRHOIDECTOMY     • PANCREATECTOMY     • SPLENECTOMY         Family History: family history is not on file. Otherwise pertinent FHx was reviewed and unremarkable.     Social History:  reports that she has never smoked. She does not have any smokeless tobacco history on file. She reports that she does not drink alcohol or use drugs.  Social History     Social History Narrative   • Not on file       Medications:  Available home medication information reviewed.  Medications Prior to Admission   Medication Sig Dispense Refill Last Dose   • alendronate (FOSAMAX) 70 MG tablet Take 1 tablet by mouth 1 (one) time per week.   Unknown at Unknown time   •  atorvastatin (LIPITOR) 10 MG tablet TAKE 1 TABLET AT BEDTIME 90 tablet 1 Unknown at Unknown time   • FLUoxetine (PROzac) 10 MG capsule Take 1 capsule by mouth daily.   Unknown at Unknown time   • levothyroxine (SYNTHROID, LEVOTHROID) 75 MCG tablet TAKE 1 TABLET EVERY DAY (NEED MD APPOINTMENT) 90 tablet 3 Unknown at Unknown time   • promethazine (PHENERGAN) 25 MG tablet Take  by mouth.   Unknown at Unknown time   • trandolapril (MAVIK) 2 MG tablet TAKE 1 TABLET EVERY DAY 90 tablet 5 Unknown at Unknown time   • vitamin d (RA VITAMIN D-3) 5000 UNITS capsule Take 1 tablet by mouth daily.   Unknown at Unknown time       Allergies   Allergen Reactions   • Sulfa Antibiotics        Objective   Objective     Vital Signs:   Temp:  [100.4 °F (38 °C)-101.2 °F (38.4 °C)] 100.4 °F (38 °C)  Heart Rate:  [87] 87  Resp:  [16] 16  BP: (177)/(83) 177/83   Total (NIH Stroke Scale): 22    Physical Exam    gen; will open eyes to painful stimuli and gazes mostly to the left w/ very mumbled quiet speech  Heent; wandering eyes, pasty mm  Cv; rrr, no mrg  L; ctab, poor inspir effort  Abd; soft, +bs, ?suprapubic ttp  Ext; no cce, 2+ pulses  Skin; pale, cdi, warm  Neuro; pt able to  w/ left>right; keeps right arm contracted for most part; spontaneously moves ble but not purposeful.  No obvious facial .  Tends to gaze to left.  Psych; unable to asses    Results Reviewed:  I have personally reviewed current lab and radiology data.              Invalid input(s):  ALKPHOS  CrCl cannot be calculated (Patient's most recent lab result is older than the maximum 30 days allowed.).  Brief Urine Lab Results     None        Imaging Results (Last 24 Hours)     ** No results found for the last 24 hours. **             Assessment/Plan   Assessment / Plan     Active Hospital Problems    Diagnosis POA   • Encephalopathy [G93.40] Yes   • Acute UTI (urinary tract infection) [N39.0] Yes   • Sepsis, unspecified organism (CMS/HCC) [A41.9] Yes   •  Immunosuppressed status (CMS/HCC) [D89.9] Yes   • Lactic acidosis [E87.2] Yes   • Mixed hyperlipidemia [E78.2] Yes   • Essential hypertension [I10] Yes   • Acquired hypothyroidism [E03.9] Yes     78 y/o immunocompromised (splenectomy) female who was found down nearly 20 hours after last known well;  1. Encephalopathy;  -?infectious vs sz vs stroke  a. Sepsis;  -pt w/ fever, tachycardia, lactic acidosis, leukocytosis  -UA positive but not significant enough for her level of acuity so do not think this is primary culprit  -check blood cultures, cxr; may need LP pending remainder of eval and will place on vanc, cefepime, and ampicillin to cover for meningitis given immunocompromised w/ altered level of consciousness and fever.  b. ?stroke w/ nih16-->22 now  -pt w/ garbled speech and tends to gaze to left w/ ?rue contracted  -stat mri, stat cta h/n, stat ct perfusion  -neurology notified by osh  c. ?sz; pt found incontinent of feces  -plan eeg today; neurology notified prior to transfer  2. Lactic acidosis; ns and repeat.  Related to above likely but ?abd pain.  May  Need CT pending results of above  3. Hypothyroidism; check tsh; cont synthroid  4. Htn; permissive htn for now  5. Hyperlipidemia; high dose statin ordered; flp    DVT prophylaxis:  scd's    CODE STATUS:    Code Status and Medical Interventions:   Ordered at: 11/13/19 0315     Code Status:    CPR     Medical Interventions (Level of Support Prior to Arrest):    Full       Admission Status:  I believe this patient meets  INPATIENT status due to acute encephalopathy w/ sepsis and ?stroke.  I feel patient’s risk for adverse outcomes and need for care warrant INPATIENT evaluation and I predict the patient’s care encounter to likely last beyond 2 midnights.    Electronically signed by Adelita Lua MD, 11/13/19, 3:17 AM.  75 minutes of critical care provided. This time excludes other billable procedures. Time does include preparation of documents, medical  "consultations, review of old records, and direct bedside care. Patient was at high risk for life-threatening deterioration due to acute encephalopathy of uncertain etiology w/ sepsis and ?stroke.     Electronically signed by Adelita Lua MD at 11/13/19 0341       Vital Signs (last day)     Date/Time   Temp   Temp src   Pulse   Resp   BP   Patient Position   SpO2    11/15/19 0756   97.9 (36.6)   Axillary   112   16   187/92  (Abnormal)    Lying   --    11/15/19 0407   98.3 (36.8)   Oral   112   18   170/77   Lying   96    11/14/19 2335   --   --   --   --   134/71   Lying   96    11/14/19 2313   97.9 (36.6)   Oral   109   17   179/82   Lying   --    11/14/19 1931   98.6 (37)   Oral   98   17   177/76   Lying   97    11/14/19 1830   --   --   --   --   156/74   --   98    11/14/19 1623   98 (36.7)   Oral   90   17   202/97  (Abnormal)    Lying   97    11/14/19 1136   98.6 (37)   Axillary   93   17   180/84   Lying   99    11/14/19 1127   --   --   91   --   175/97   --   --    11/14/19 0749   98.1 (36.7)   Axillary   96   17   183/88  (Abnormal)    --   97    11/14/19 0320   97.3 (36.3)   Oral   99   17   175/82   Lying   --    11/14/19 0125   --   --   65   --   --   --   --              Hospital Medications (active)       Dose Frequency Start End    ! Hold anticoagulants 24hr prior to lumbar puncture 1 each Continuous PRN 11/13/2019     Sig - Route: 1 each Continuous As Needed (Lumbar Puncture). - Does not apply    ! Vancomycin trough 11/15 at 0830.  Please hold vancomycin dose 11/15 at 0900 until pharmacy can evaluate level  Once 11/15/2019     Sig - Route: 1 (One) Time. - Does not apply    acetaminophen (TYLENOL) suppository 650 mg 650 mg Every 4 Hours PRN 11/13/2019     Sig - Route: Insert 1 suppository into the rectum Every 4 (Four) Hours As Needed for Mild Pain  or Fever (Temperature greater than or equal to 37 C). - Rectal    Linked Group 1:  \"Or\" Linked Group Details        acetaminophen (TYLENOL) tablet " "650 mg 650 mg Every 4 Hours PRN 11/13/2019     Sig - Route: Take 2 tablets by mouth Every 4 (Four) Hours As Needed for Mild Pain  or Fever (Temperature greater than or equal to 37 C). - Oral    Linked Group 1:  \"Or\" Linked Group Details        aspirin chewable tablet 81 mg 81 mg Daily 11/13/2019     Sig - Route: Chew 1 tablet Daily. - Oral    atorvastatin (LIPITOR) tablet 80 mg 80 mg Nightly 11/13/2019     Sig - Route: Take 2 tablets by mouth Every Night. - Oral    cefTRIAXone (ROCEPHIN) 2 g/100 mL 0.9% NS VTB (JOSLYN) 2 g Every 24 Hours Scheduled 11/14/2019 11/21/2019    Sig - Route: Infuse 100 mL into a venous catheter Daily. - Intravenous    dextrose (D50W) 25 g/ 50mL Intravenous Solution 25 g 25 g Every 15 Minutes PRN 11/13/2019     Sig - Route: Infuse 50 mL into a venous catheter Every 15 (Fifteen) Minutes As Needed for Low Blood Sugar (Blood Sugar Less Than 70). - Intravenous    dextrose (GLUTOSE) oral gel 15 g 15 g Every 15 Minutes PRN 11/13/2019     Sig - Route: Take 15 application by mouth Every 15 (Fifteen) Minutes As Needed for Low Blood Sugar (Blood sugar less than 70). - Oral    glucagon (human recombinant) (GLUCAGEN DIAGNOSTIC) injection 1 mg 1 mg Every 15 Minutes PRN 11/13/2019     Sig - Route: Inject 1 mg under the skin into the appropriate area as directed Every 15 (Fifteen) Minutes As Needed for Low Blood Sugar (Blood Glucose Less Than 70). - Subcutaneous    hydrALAZINE (APRESOLINE) injection 10 mg 10 mg Every 6 Hours PRN 11/14/2019     Sig - Route: Infuse 0.5 mL into a venous catheter Every 6 (Six) Hours As Needed for High Blood Pressure. - Intravenous    insulin lispro (humaLOG) injection 0-7 Units 0-7 Units 4 Times Daily With Meals & Nightly 11/13/2019     Sig - Route: Inject 0-7 Units under the skin into the appropriate area as directed 4 (Four) Times a Day With Meals & at Bedtime. - Subcutaneous    levothyroxine (SYNTHROID, LEVOTHROID) tablet 75 mcg 75 mcg Daily 11/13/2019     Sig - Route: Take 1 " "tablet by mouth Daily. - Oral    lidocaine (XYLOCAINE) 1 % injection 5 mL 5 mL Once 11/13/2019     Sig - Route: Inject 5 mL under the skin into the appropriate area as directed 1 (One) Time. - Subcutaneous    Cosign for Ordering: Accepted by Vidal Correa MD on 11/14/2019 10:56 AM    lisinopril (PRINIVIL,ZESTRIL) tablet 20 mg 20 mg Every 24 Hours Scheduled 11/15/2019     Sig - Route: Take 1 tablet by mouth Daily. - Oral    ondansetron (ZOFRAN) injection 4 mg 4 mg Every 6 Hours PRN 11/13/2019     Sig - Route: Infuse 2 mL into a venous catheter Every 6 (Six) Hours As Needed for Nausea or Vomiting. - Intravenous    ondansetron (ZOFRAN) tablet 4 mg 4 mg Once As Needed 11/13/2019     Sig - Route: Take 1 tablet by mouth 1 (One) Time As Needed for Nausea or Vomiting. - Oral    Pharmacy to dose vancomycin  Continuous PRN 11/13/2019 11/16/2019    Sig - Route: Continuous As Needed for Consult. - Does not apply    promethazine (PHENERGAN) injection 12.5 mg 12.5 mg Every 6 Hours PRN 11/14/2019     Sig - Route: Inject 0.5 mL into the appropriate muscle as directed by prescriber Every 6 (Six) Hours As Needed for Nausea or Vomiting. - Intramuscular    Linked Group 2:  \"Or\" Linked Group Details        promethazine (PHENERGAN) suppository 12.5 mg 12.5 mg Every 6 Hours PRN 11/14/2019     Sig - Route: Insert 1 suppository into the rectum Every 6 (Six) Hours As Needed for Nausea or Vomiting. - Rectal    Linked Group 2:  \"Or\" Linked Group Details        promethazine (PHENERGAN) tablet 12.5 mg 12.5 mg Every 6 Hours PRN 11/14/2019     Sig - Route: Take 1 tablet by mouth Every 6 (Six) Hours As Needed for Nausea or Vomiting. - Oral    Linked Group 2:  \"Or\" Linked Group Details        QUEtiapine (SEROquel) tablet 25 mg 25 mg Nightly PRN 11/14/2019     Sig - Route: Take 1 tablet by mouth At Night As Needed (Insomnia, agitation, psychosis). - Oral    sodium chloride 0.9 % flush 10 mL 10 mL Every 12 Hours Scheduled 11/13/2019     Sig - " "Route: Infuse 10 mL into a venous catheter Every 12 (Twelve) Hours. - Intravenous    sodium chloride 0.9 % flush 10 mL 10 mL As Needed 11/13/2019     Sig - Route: Infuse 10 mL into a venous catheter As Needed for Line Care. - Intravenous    sodium chloride 0.9 % infusion 100 mL/hr Continuous 11/13/2019     Sig - Route: Infuse 100 mL/hr into a venous catheter Continuous. - Intravenous    vancomycin in dextrose 5% 150 mL (VANCOCIN) IVPB 750 mg 15 mg/kg × 57.4 kg Every 12 Hours 11/13/2019 11/16/2019    Sig - Route: Infuse 150 mL into a venous catheter Every 12 (Twelve) Hours. - Intravenous    acyclovir (ZOVIRAX) 500 mg in sodium chloride 0.9 % 100 mL IVPB (Discontinued) 10 mg/kg × 50.1 kg (Ideal) Every 8 Hours 11/13/2019 11/14/2019    Sig - Route: Infuse 500 mg into a venous catheter Every 8 (Eight) Hours. - Intravenous    ampicillin 2 g/100 mL 0.9% NS (MBP) (Discontinued) 2 g Every 4 Hours Scheduled 11/13/2019 11/14/2019    Sig - Route: Infuse 100 mL into a venous catheter Every 4 (Four) Hours. - Intravenous    cefepime (MAXIPIME) 2 g/100 mL 0.9% NS (mbp) (Discontinued) 2 g Every 8 Hours 11/13/2019 11/14/2019    Sig - Route: Infuse 100 mL into a venous catheter Every 8 (Eight) Hours. - Intravenous             Physician Progress Notes (last 24 hours) (Notes from 11/14/19 0803 through 11/15/19 0803)      Zina Delcid MD at 11/14/19 1312        Neurology       Patient Care Team:  Jose Manuel Cabrales MD as PCP - General  Jose Manuel Cabrales MD as PCP - [facility] Medicine  FlorissantEnoch MD as PCP - Claims Attributed    Chief complaint encephalopathy in setting of acute febrile illness      Subjective .     History: Patient reports she feels bad today \"all over.\"  Feels pain all over.  Denies any difficulty with vision.  Sisters at bedside note that she was confused in the night, thinking nurses were trying to harm her and slept poorly.  Nauseated and vomiting intermittently overnight.  Sisters note that " "she had an upper respiratory illness, but had reported on Monday 11/11 that she was feeling better.    ROS: No fever or shortness of breath    Objective     Vital Signs   Blood pressure 180/84, pulse 93, temperature 98.6 °F (37 °C), temperature source Axillary, resp. rate 17, height 157.5 cm (62.01\"), weight 57.4 kg (126 lb 8 oz), SpO2 99 %.    Physical Exam:              Neuro: Elderly white woman lying in hospital bed with eyes closed, wakes/opens eyes to voice and regards, oriented to [facility], to the month and the year.  Speech is clear, fluent and non-aphasic.  Follows verbal commands well.  Attention improved.  Names family at bedside correctly, with no bradyphrenia.  Pupils 2.5 mm and reactive, VFF, EOMI, face symmetric   well and equally and wiggles feet bilaterally to command    Results Review:              EEG yesterday with mild to moderate generalized slowing and no epileptiform activity  MRI brain showed no abnormality on diffusion weighted imaging, CT perfusion and CTA without significant stenosis or ischemia.  2 mm left P-comm aneurysm  Labs reviewed, CSF glucose 92, protein 43, Gram stain with few white cells and no organisms, no growth to date  Meningitis/encephalitis panel PCR on CSF all negative  Cell count tube 4 shows 49 white cells, 27 red cells, clear and colorless.  Differential shows 94% neutrophils, 3% lymphocytes  UA with 40 ketones moderate blood 30 protein small leukocytes 21-30 white cells 3+ bacteria and 3-6 squamous epithelial cells    Assessment/Plan     Febrile illness with encephalopathy- patient on broad-spectrum antibiotics for infection with unknown source.  CSF with normal protein, negative M/E panel, does not appear to be CNS infection.  Acyclovir discontinued.  She has been afebrile over the last 24 hours and despite some delirium last night, encephalopathy seems slightly improved overall.  Concern for ongoing systemic infection as etiology.  " On Vanco and cefepime.  Would prefer to avoid the latter in patient with significant encephalopathy on admission due to risk of worsening encephalopathy or seizures.  Discussed with Dr. Kee who will change antibiotic.  Also reportedly slept poorly, with delirium and will therefore start PRN Seroquel at night.    I discussed the patients findings and my recommendations with patient, family and consulting provider    Zina Delcid MD  11/14/19  1:12 PM        Electronically signed by Zina Delcid MD at 11/14/19 5110     Jose Kee MD at 11/14/19 1149          Infectious Disease Progress Note  Aura Fischer  1940  [id number]    Date of Consult: 11/13/2019  Admission Date: 11/13/2019    Requesting Provider: Enoch Davis,*  Evaluating Physician: Jose Kee MD    Chief Complaint: confusion    Reason for Consultation: fever, confusion    History of present illness:   Patient is a 79 y.o.  Yr old female with history of HTN, HLD, thyroid disease.  She was taken to the emergency department and [place] after being found down by her family, nearly unresponsive.  She was last seen well about 20 hours prior and at that time complained of sinusitis and sore throat.  She had not taken any antibiotics.  She had been near her sister who is sick with similar symptoms.  Nobody with known influenza.  No recent travel.  No recent procedures, injections etc.  She was worked up initially in the [place] emergency department and transferred to [facility] on 11/13 for further work-up.  Febrile on arrival here up to 101.  Mental status is improved but she remains significantly confused.  She is oriented to name but answers all other questions including location and date with her son's name.  Son and daughter-in-law are at bedside who provided the history. Influenza PCR negative. WBC 12. UA pending. Started on empiric antibiotics for meningitis. Per family mental status is better  "in the past 24 hrs.     11/14: Afebrile overnight.  Stable confusion.  She answers all questions with her name and is not oriented.  She complains of \"pain all over.\"    Review of Systems  Unable to obtain due to confusion.         Allergies   Allergen Reactions   • Sulfa Antibiotics        Antibiotics:  Anti-Infectives (From admission, onward)    Ordered     Dose/Rate Route Frequency Start Stop    11/13/19 0702  vancomycin in dextrose 5% 150 mL (VANCOCIN) IVPB 750 mg     Ordering Provider:  Jose Lopez RPH    15 mg/kg × 57.4 kg  over 60 Minutes Intravenous Every 12 Hours 11/13/19 2100 11/16/19 0859    11/13/19 0315  cefepime (MAXIPIME) 2 g/100 mL 0.9% NS (mbp)     Ordering Provider:  Adelita Lua MD    2 g  over 4 Hours Intravenous Every 8 Hours 11/13/19 1200 11/16/19 1159    11/13/19 0815  acyclovir (ZOVIRAX) 500 mg in sodium chloride 0.9 % 100 mL IVPB     Ordering Provider:  Bruno Ocampo MD    10 mg/kg × 50.1 kg (Ideal)  over 60 Minutes Intravenous Once 11/13/19 0900 11/13/19 1042    11/13/19 0612  vancomycin 1500 mg/500 mL 0.9% NS IVPB ([facility])     Ordering Provider:  Jose Lopez RPH    25 mg/kg × 57.4 kg  over 90 Minutes Intravenous Once 11/13/19 0700 11/13/19 0948    11/13/19 0315  cefepime (MAXIPIME) 2 g/100 mL 0.9% NS (mbp)     Ordering Provider:  Adelita Lua MD    2 g  200 mL/hr over 30 Minutes Intravenous Once 11/13/19 0415 11/13/19 0804    11/13/19 0315  Pharmacy to dose vancomycin     Ordering Provider:  Adelita Lua MD     Does not apply Continuous PRN 11/13/19 0312 11/16/19 0311          Other Medications:  Current Facility-Administered Medications   Medication Dose Route Frequency Provider Last Rate Last Dose   • ! Hold anticoagulants 24hr prior to lumbar puncture  1 each Does not apply Continuous PRN Jose Kee MD       • [START ON 11/15/2019] ! Vancomycin trough 11/15 at 0830.  Please hold vancomycin dose 11/15 at 0900 until pharmacy can evaluate level   Does not " apply Once Jose Lopez, [facility]       • acetaminophen (TYLENOL) tablet 650 mg  650 mg Oral Q4H PRN Day, Adelita PINEDA MD   650 mg at 11/14/19 0849    Or   • acetaminophen (TYLENOL) suppository 650 mg  650 mg Rectal Q4H PRN Day, Adelita PINEDA MD       • aspirin chewable tablet 81 mg  81 mg Oral Daily Day, Adelita PINEDA MD   81 mg at 11/14/19 0849   • atorvastatin (LIPITOR) tablet 80 mg  80 mg Oral Nightly Day, Adelita PINEDA MD   80 mg at 11/13/19 2206   • cefepime (MAXIPIME) 2 g/100 mL 0.9% NS (mbp)  2 g Intravenous Q8H Day, Adelita PINEDA MD   2 g at 11/14/19 0326   • dextrose (D50W) 25 g/ 50mL Intravenous Solution 25 g  25 g Intravenous Q15 Min PRN Dossetradha, Bruno PANG MD       • dextrose (GLUTOSE) oral gel 15 g  15 g Oral Q15 Min PRN DossetBruno garcia MD       • glucagon (human recombinant) (GLUCAGEN DIAGNOSTIC) injection 1 mg  1 mg Subcutaneous Q15 Min PRN Bruno Ocampo MD       • hydrALAZINE (APRESOLINE) injection 10 mg  10 mg Intravenous Q6H PRN Naheed Lowe II, DO       • insulin lispro (humaLOG) injection 0-7 Units  0-7 Units Subcutaneous 4x Daily With Meals & Nightly DossetBruno garcia MD   2 Units at 11/14/19 0849   • levothyroxine (SYNTHROID, LEVOTHROID) tablet 75 mcg  75 mcg Oral Daily Day, Adelita PINEDA MD       • lidocaine (XYLOCAINE) 1 % injection 5 mL  5 mL Subcutaneous Once Vidal Correa MD       • ondansetron (ZOFRAN) injection 4 mg  4 mg Intravenous Q6H PRN Susanne Allen APRN   4 mg at 11/14/19 0436   • ondansetron (ZOFRAN) tablet 4 mg  4 mg Oral Once PRN Jose Kee MD       • Pharmacy to dose vancomycin   Does not apply Continuous PRN Day, Adelita PINEDA MD       • sodium chloride 0.9 % flush 10 mL  10 mL Intravenous Q12H Day, Adelita PINEDA MD   10 mL at 11/14/19 0918   • sodium chloride 0.9 % flush 10 mL  10 mL Intravenous PRN Day, Adelita PINEDA MD       • sodium chloride 0.9 % infusion  100 mL/hr Intravenous Continuous Day, Adelita PINEDA  mL/hr at 11/13/19 0717 100 mL/hr at 11/13/19 0717   • vancomycin in dextrose  "5% 150 mL (VANCOCIN) IVPB 750 mg  15 mg/kg Intravenous Q12H Jose Lopez, RPH   750 mg at 11/14/19 0912       Physical Exam:   Vital Signs   /84 (BP Location: Right arm, Patient Position: Lying)   Pulse 93   Temp 98.6 °F (37 °C) (Axillary)   Resp 17   Ht 157.5 cm (62.01\")   Wt 57.4 kg (126 lb 8 oz)   SpO2 99%   BMI 23.13 kg/m²      GENERAL: Awake and alert, confused, oriented to name only.   HEENT: Normocephalic, atraumatic.  PERRL. EOMI. No conjunctival injection. No icterus.  NECK: Supple without nuchal rigidity or meningismus. No mass.  LYMPH: No cervical lymphadenopathy.  HEART: RRR; No murmur.  LUNGS: Clear to auscultation bilaterally without wheezing, rales, rhonchi. Normal respiratory effort. Nonlabored.  ABDOMEN: Soft, non-tender today, nondistended. Positive bowel sounds. No rebound or guarding.  EXT:  No cyanosis, clubbing or edema.  : Without Whitehead catheter.  MSK: FROM without joint effusions noted arms/legs.    SKIN: Warm and dry without cutaneous eruptions on Inspection/palpation.    NEURO: Oriented to person only. No focal deficits on motor/sensory exam at arms/legs.  PSYCHIATRIC: confused but cooperative.     Laboratory Data    Results from last 7 days   Lab Units 11/13/19  0524   WBC 10*3/mm3 12.97*   HEMOGLOBIN g/dL 12.4   HEMATOCRIT % 38.3   PLATELETS 10*3/mm3 293     Results from last 7 days   Lab Units 11/13/19  0524   SODIUM mmol/L 137   POTASSIUM mmol/L 3.7   CHLORIDE mmol/L 99   CO2 mmol/L 22.0   BUN mg/dL 15   CREATININE mg/dL 0.79   GLUCOSE mg/dL 184*   CALCIUM mg/dL 8.7     Estimated Creatinine Clearance: 52.5 mL/min (by C-G formula based on SCr of 0.79 mg/dL).  Results from last 7 days   Lab Units 11/13/19  0524   ALK PHOS U/L 106   BILIRUBIN mg/dL 0.7   ALT (SGPT) U/L 31   AST (SGOT) U/L 29               Microbiology:   11/13 blood cultures pending, NGTD  Influenza PCR negative   UA positive, culture pending  CSF culture with Gram stain negative, culture " pending  Meningitis/encephalitis panel is negative    Radiology:  Ct Angiogram Neck    Result Date: 11/13/2019  1. Densely calcified plaque at the bilateral carotid bifurcations. By NASCET criteria there is 0% diameter stenosis on the right and approximately 30% diameter stenosis on the left. (Degree of estimated stenosis on the left is a change from the preliminary report; please note the degree of stenosis is difficult to accurately estimate given the density of calcified plaque.) 2. Both vertebral arteries are patent and codominant. 3. No intracranial vascular cutoff or focal central stenosis allowing for hypoplastic right A1 vessel. 4. 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 Signer Name: Avelina Freitas MD  Signed: 11/13/2019 8:55 AM  Workstation Name: [id number]  Radiology [facility]    Mri Brain Without Contrast    Result Date: 11/13/2019  1. Incomplete study limited to only diffusion-weighted imaging. 2. No gross evidence of acute ischemia or other restricted diffusion. 3. Recommend CT imaging if not already performed. Signer Name: Pascual Sandy MD  Signed: 11/13/2019 5:02 AM  Workstation Name: [facility]  Radiology [facility]    Xr Chest 1 View    Result Date: 11/13/2019  No active disease. Cardiomegaly. Signer Name: Pascual Sandy MD  Signed: 11/13/2019 4:11 AM  Workstation Name: [facility]  [facility]    Ir Lumbar Puncture Diag/thera    Result Date: 11/13/2019  Successful fluoroscopic guided lumbar puncture as above with no immediate complications.    This report was finalized on 11/13/2019 4:46 PM by Dr. Angus Correa MD.      Ct Angiogram Head    Result Date: 11/13/2019  1. Densely calcified plaque at the bilateral carotid bifurcations. By NASCET criteria there is 0% diameter stenosis on the right and approximately 30% diameter stenosis on the left. (Degree of estimated stenosis on the left is a change from the  preliminary report; please note the degree of stenosis is difficult to accurately estimate given the density of calcified plaque.) 2. Both vertebral arteries are patent and codominant. 3. No intracranial vascular cutoff or focal central stenosis allowing for hypoplastic right A1 vessel. 4. 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 Signer Name: Avelina Freitas MD  Signed: 11/13/2019 8:55 AM  Workstation Name: [id number]  [facility]    Ct Cerebral Perfusion With & Without Contrast    Result Date: 11/13/2019  No focal area of acute ischemia identified. 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 Fever, sepsis: source still not entirely clear yet, although significant pyuria on UA makes severe UTI more likely. Urine culture pending. She has a mild pleocytosis on CSF with neutrophilic predominance but normal protein and elevated glucose so not classic for bacterial meningitis. Biofire negative. Seems more likely to be reactive to some other acute process. Blood cx NGTD. CXR clear. Influenza PCR negative. + sick contact with viral URI suggests URI, but usually that does not make patients this confused, so I suspect something else is going on. No recent procedures, wounds, etc.     2. Acute encephalopathy: found down. Head imaging negative for CVA so far, although CVA not entirely ruled out. EEG negative. Neurology following. No improvement over past 24 hrs   3. Leukocytosis, no differential on CBC yet  4. RLQ discomfort: improved UTI, Diverticulitis, appendicitis?  5. MV abnormality: seen on TTE. Fibroelastoma? Will have to look more closely for endocarditis if blood cx turn positive   6. Fecal incontinence when found down: loose stools but no diarrhea per RN  7. Hx of splenectomy:  "increased risk for encapsulated organisms.  8. HTN  9. HLD  10. Hx of thyroid disease: TSH ok    PLAN:    - f/u any pending cultures done in [place]. Will attempt to call again tomorrow.   - f/u pending blood cx and urine cx at [facility]  - trend CBC w/diff and BMP    - no evidence of HSV. Stop acyclovir  - biofire negative for listeria. Stop ampicillin  - continue IV vancomycin for now. Pharmacy to assist with vancomycin dosing and medication monitoring to limit risk of toxicity  - continue IV coverage for possible UTI. Discussed with Dr Delcid: will switch to ceftriaxone to avoid possibility for lowering seizure threshold and possible encephalopathy side effect.      Complex case.  I will follow closely.  Discussed with family at bedside.     Jose Kee MD  2019      Electronically signed by Jose Kee MD at 19 1643     Naheed Lowe II, DO at 19 0849              [facility]  PROGRESS NOTE    Patient Name: Aura Fischer  : 1940  MRN: [id number]    Date of Admission: 2019  Primary Care Physician: Jose Manuel Cabrales MD    Subjective   Subjective     CC: f/u AMS    HPI: Up in bed saying \"it doesn't matter.\" Doesn't participate in conversation.     Review of Systems  UTO due to AMS.      Objective   Objective     Vital Signs:   Temp:  [97.3 °F (36.3 °C)-98.8 °F (37.1 °C)] 98.6 °F (37 °C)  Heart Rate:  [65-99] 93  Resp:  [17-18] 17  BP: (162-198)/(76-99) 180/84  Total (NIH Stroke Scale): 5     Physical Exam:  Constitutional: No acute distress, awake, alert, up in bed  HENT: NCAT, mucous membranes moist  Respiratory: Clear to auscultation bilaterally, respiratory effort normal   Cardiovascular: RRR, no murmurs, rubs, or gallops, palpable pedal pulses bilaterally  Gastrointestinal: Positive bowel sounds, soft, nontender, nondistended  Musculoskeletal: No bilateral ankle edema  Psychiatric: Pleasant  Neurologic: No spontaneous " conversation, inappropriately answers questions, doesn't follow commands.  Skin: No rashes    Results Reviewed:    Results from last 7 days   Lab Units 11/13/19  0524   WBC 10*3/mm3 12.97*   HEMOGLOBIN g/dL 12.4   HEMATOCRIT % 38.3   PLATELETS 10*3/mm3 293     Results from last 7 days   Lab Units 11/13/19  0524   SODIUM mmol/L 137   POTASSIUM mmol/L 3.7   CHLORIDE mmol/L 99   CO2 mmol/L 22.0   BUN mg/dL 15   CREATININE mg/dL 0.79   GLUCOSE mg/dL 184*   CALCIUM mg/dL 8.7   ALT (SGPT) U/L 31   AST (SGOT) U/L 29   TROPONIN T ng/mL <0.010     Estimated Creatinine Clearance: 52.5 mL/min (by C-G formula based on SCr of 0.79 mg/dL).    Microbiology Results Abnormal     Procedure Component Value - Date/Time    Blood Culture - Blood, Hand, Right [[id number]] Collected:  11/13/19 0533    Lab Status:  Preliminary result Specimen:  Blood from Hand, Right Updated:  11/14/19 0801     Blood Culture No growth at 24 hours    Blood Culture - Blood, Arm, Right [[id number]] Collected:  11/13/19 0542    Lab Status:  Preliminary result Specimen:  Blood from Arm, Right Updated:  11/14/19 0801     Blood Culture No growth at 24 hours    Culture, CSF - Cerebrospinal Fluid, Lumbar Puncture [[id number]] Collected:  11/13/19 1420    Lab Status:  Preliminary result Specimen:  Cerebrospinal Fluid from Lumbar Puncture Updated:  11/14/19 0711     CSF Culture No growth     Gram Stain Few (2+) WBCs seen      No organisms seen    Meningitis / Encephalitis Panel, PCR - Cerebrospinal Fluid, Lumbar Puncture [[id number]]  (Normal) Collected:  11/13/19 1420    Lab Status:  Final result Specimen:  Cerebrospinal Fluid from Lumbar Puncture Updated:  11/13/19 1628     ESCHERICHIA COLI K1, PCR Not Detected     HAEMOPHILUS INFLUENZAE, PCR Not Detected     LISTERIA MONOCYTOGENES, PCR Not Detected     NEISSERIA MENINGITIDIS, PCR Not Detected     STREPTOCOCCUS AGALACTIAE, PCR Not Detected     STREPTOCOCCUS PNEUMONIAE, PCR Not Detected     CYTOMEGALOVIRUS (CMV), PCR Not  Detected     ENTEROVIRUS, PCR Not Detected     HERPES SIMPLEX VIRUS 1 (HSV-1), PCR Not Detected     HERPES SIMPLEX VIRUS 2 (HSV-2), PCR Not Detected     HUMAN PARECHOVIRUS, PCR Not Detected     VARICELLA ZOSTER VIRUS (VZV), PCR Not Detected     CRYPTOCOCCUS NEOFORMANS / GATTII, PCR Not Detected     HUMAN HERPES VIRUS 6 PCR Not Detected    Influenza A & B PCR - Swab, Nasopharynx [[id number]]  (Normal) Collected:  11/13/19 0811    Lab Status:  Final result Specimen:  Swab from Nasopharynx Updated:  11/13/19 1023     Influenza A PCR Not Detected     Influenza B PCR Not Detected        CT head personally reviewed without acute disease. Agree with interpretation.  Imaging Results (Last 24 Hours)     Procedure Component Value Units Date/Time    IR Lumbar Puncture Diag/Thera [[id number]] Collected:  11/13/19 1434     Updated:  11/13/19 1649    Narrative:       EXAMINATION: IR LUMBAR PUNCTURE DIAG/THERA-     INDICATION: fever, confusion, rule out meningitis     TECHNIQUE: 4 seconds of fluoroscopic time was used for this procedure. 1  associated image was saved. Informed consent was obtained from the  patient's son. The patient was placed in the prone position on the  table. The back was prepped and draped in a sterile fashion. 1%  lidocaine was used as a local anesthetic to the skin and subcutaneous  tissues. Under fluoroscopic guidance a 22-gauge spinal needle was  advanced at the L2-L3 level to the CSF space. Approximately 6 cc of  clear and colorless CSF was returned and collected in 4 numbered vials.  Sample vials were labeled and sent to pathology for further analysis.  The needle was removed.     COMPARISON: NONE     FINDINGS: The patient tolerated the procedure well, and no immediate  complications occurred.          Impression:       Successful fluoroscopic guided lumbar puncture as above with  no immediate complications.         This report was finalized on 11/13/2019 4:46 PM by Dr. Angus Correa MD.              Results for orders placed during the hospital encounter of 11/13/19   Adult Transthoracic Echo Complete W/ Cont if Necessary Per Protocol (With Agitated Saline)    Narrative · Normal cardiac chamber sizes and wall thicknesses.  · Global and segmental LV wall motion is normal with an estimated LV   ejection fraction >70%.  · There is mild aortic valve sclerosis. I cannot exclude mild aortic   insufficiency.  · There is mitral annular calcification and mild mitral regurgitation.  · The estimated pulmonary artery pressure is normal.  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 This has the appearance of a fibroelastoma although I   cannot exclude myxoma, organized thrombus,or leaflet calcification from   this study.  · Agitated saline study is negative for intracardiac shunt.          I have reviewed the medications:  Scheduled Meds:  [START ON 11/15/2019] Pharmacy Consult  Does not apply Once   aspirin 81 mg Oral Daily   atorvastatin 80 mg Oral Nightly   cefepime 2 g Intravenous Q8H   insulin lispro 0-7 Units Subcutaneous 4x Daily With Meals & Nightly   levothyroxine 75 mcg Oral Daily   lidocaine 5 mL Subcutaneous Once   sodium chloride 10 mL Intravenous Q12H   vancomycin 15 mg/kg Intravenous Q12H     Continuous Infusions:  hold 1 each    Pharmacy to dose vancomycin     sodium chloride 100 mL/hr Last Rate: 100 mL/hr (11/13/19 0717)     PRN Meds:.•  hold  •  acetaminophen **OR** acetaminophen  •  dextrose  •  dextrose  •  glucagon (human recombinant)  •  hydrALAZINE  •  ondansetron  •  ondansetron  •  Pharmacy to dose vancomycin  •  sodium chloride      Assessment/Plan   Assessment / Plan     Active Hospital Problems    Diagnosis  POA   • Acute metabolic encephalopathy [G93.41]  Yes   • Acute UTI (urinary tract infection) [N39.0]  Yes   • Sepsis, unspecified organism (CMS/HCC) [A41.9]  Yes   • Lactic acidosis [E87.2]  Yes   • Mixed hyperlipidemia [E78.2]  Yes    • Essential hypertension [I10]  Yes   • Acquired hypothyroidism [E03.9]  Yes      Resolved Hospital Problems   No resolved problems to display.        Brief Hospital Course to date:  Aura Fischer is a 79 y.o. female who was found down nearly 20 hours after last known well. She is found to have metabolic encephalopathy likely due to viral illness. This is my first day assessing patient's active medical issues.    A/P:  --Her encephalopathy persists and is most likely due to acute viral illness. Appears to have UTI. Her cultures are largely unremarkable at this time though she does have significant amt of WBC and neutrophils in LP. ID following, will defer deescilation of abx to them.  --Neurology following, also agree that this is likely related to above. There was initial concern for seizure activity, but darby EEG unrevealing. Holding AEDs for now.  --Her CT perfusion, CTA head and neck were largely unremarkable. May need repeat MRI but unable to cooperate at this time. Initial MRI without ischemia.  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 May need to consider MARGARITO as well once improved.  --Her HTN is uncontrolled, but not reliably taking PO. Will add prn hydralazine in event her BP is contributing to her encephalopathy.  --PT/OT when able.    DVT Prophylaxis:  Mechanical.    Disposition: I expect the patient to be discharged TBD.    CODE STATUS:   Code Status and Medical Interventions:   Ordered at: 11/13/19 0803     Level Of Support Discussed With:    Next of Kin (If No Surrogate)     Code Status:    No CPR     Medical Interventions (Level of Support Prior to Arrest):    Full         Electronically signed by Naheed Lowe II, DO, 11/14/19, 11:49 AM.      Electronically signed by Naheed Lowe II, DO at 11/14/19 1307       Physical Therapy Notes (last 24 hours) (Notes from 11/14/19 0803 through 11/15/19 0803)     No notes of this type exist for this encounter.           Occupational Therapy Notes  (last 24 hours) (Notes from 19 0804 through 11/15/19 0804)      Heidi Encarnacion, OT at 19 0900          Acute Care - Occupational Therapy Initial Evaluation   [place]     Patient Name: Aura Fischer  : 1940  MRN: [id number]  Today's Date: 2019  Onset of Illness/Injury or Date of Surgery: 19  Date of Referral to OT: 19  Referring Physician: Dr. Lua    Admit Date: 2019       ICD-10-CM ICD-9-CM   1. Cognitive communication deficit R41.841 799.52   2. Impaired mobility and ADLs Z74.09 799.89     Patient Active Problem List   Diagnosis   • Rectal bleeding   • Healthcare maintenance   • Anemia   • Anxiety disorder   • Gastroesophageal reflux disease without esophagitis   • Mixed hyperlipidemia   • Essential hypertension   • Acquired hypothyroidism   • Recurrent pancreatitis   • Depression   • Osteoporosis   • Acute metabolic encephalopathy   • Acute UTI (urinary tract infection)   • Sepsis, unspecified organism (CMS/HCC)   • Lactic acidosis     Past Medical History:   Diagnosis Date   • Anemia    • Diverticulosis    • Hyperlipidemia    • Hypertension    • Internal hemorrhoid    • Mood disorder (CMS/HCC)    • Thyroid disease      Past Surgical History:   Procedure Laterality Date   • BREAST SURGERY     • CHOLECYSTECTOMY     • HEMORRHOIDECTOMY     • HYSTERECTOMY     • PANCREATECTOMY     • SPLENECTOMY            OT ASSESSMENT FLOWSHEET (last 12 hours)      Occupational Therapy Evaluation     Row Name 19 0900                   OT Evaluation Time/Intention    Subjective Information  no complaints  -AR        Document Type  evaluation  -AR        Patient Effort  good  -AR        Symptoms Noted During/After Treatment  significant change in vital signs  -AR        Comment  hypertensive , nurse aware  -AR           General Information    Patient Profile Reviewed?  yes  -AR        Onset of Illness/Injury or Date of Surgery  19  -AR        Referring Physician  Dr. Lua   -AR        Patient Observations  lethargic  -AR        Patient/Family Observations  pt supine, no family at bedside  -AR        Prior Level of Function  independent:;gait;transfer;ADL's per chart review  -AR        Equipment Currently Used at Home  -- TBA  -AR        Pertinent History of Current Functional Problem  Pt is a 79 yof found down at home. Pt found to have metabolic encephalopathy d/t viral illness.   -AR        Existing Precautions/Restrictions  fall  -AR        Risks Reviewed  patient:;LOB;nausea/vomiting;increased discomfort;dizziness;change in vital signs;increased drainage;lines disloged  -AR        Benefits Reviewed  patient:;improve function;increase independence;increase strength;increase balance;decrease risk of DVT;increase knowledge  -AR           Relationship/Environment    Primary Source of Support/Comfort  child(mariaa)  -AR        Lives With  alone  -AR           Resource/Environmental Concerns    Current Living Arrangements  home/apartment/condo  -AR           Cognitive Assessment/Interventions    Additional Documentation  Cognitive Assessment/Intervention (Group)  -AR           Cognitive Assessment/Intervention- PT/OT    Affect/Mental Status (Cognitive)  agitated initially cooperative, mild agitation end of session  -AR        Orientation Status (Cognition)  oriented to;person;disoriented to;place;situation;time  -AR        Follows Commands (Cognition)  50-74% accuracy  -AR        Cognitive Function (Cognitive)  attention deficit;executive function deficit;memory deficit;safety deficit  -AR        Executive Function Deficit (Cognition)  moderate deficit  -AR        Memory Deficit (Cognitive)  moderate deficit  -AR        Safety Deficit (Cognitive)  moderate deficit  -AR           Bed Mobility Assessment/Treatment    Bed Mobility Assessment/Treatment  scooting/bridging;supine-sit;sit-supine  -AR        Scooting/Bridging [place] (Bed Mobility)  maximum assist (25% patient effort);2 person  assist  -AR        Supine-Sit [place] (Bed Mobility)  moderate assist (50% patient effort);verbal cues  -AR        Sit-Supine [place] (Bed Mobility)  moderate assist (50% patient effort);verbal cues  -AR        Comment (Bed Mobility)  Pt limited with c/o dizziness with EOB sitting, pt with brief episode of decreased LOC for approx. 5 minutes, returned to supine per pt request. RN notified.  -AR           Transfer Assessment/Treatment    Comment (Transfers)  Deferred d/t dizziness  -AR           ADL Assessment/Intervention    BADL Assessment/Intervention  upper body dressing;lower body dressing;feeding  -AR           Upper Body Dressing Assessment/Training    Upper Body Dressing [place] Level  doff;don;front opening garment;maximum assist (25% patient effort)  -AR        Upper Body Dressing Position  supine  -AR           Lower Body Dressing Assessment/Training    Lower Body Dressing [place] Level  don;socks;dependent (less than 25% patient effort)  -AR        Lower Body Dressing Position  supine  -AR           Self-Feeding Assessment/Training    [place] Level (Feeding)  liquids to mouth;dependent (less than 25% patient effort)  -AR        Position (Self-Feeding)  supine  -AR           BADL Safety/Performance    Impairments, BADL Safety/Performance  balance;cognition;trunk/postural control  -AR        Cognitive Impairments, BADL Safety/Performance  attention;impulsivity;judgment  -AR           General ROM    GENERAL ROM COMMENTS  WFL   -AR           MMT (Manual Muscle Testing)    General MMT Comments  Grossly WFL, unable to formally assess d/t cognitive status  -AR           Motor Assessment/Interventions    Additional Documentation  Balance (Group);Balance Interventions (Group);Fine Motor Testing & Training (Group)  -AR           Balance    Balance  static sitting balance;static standing balance  -AR           Static Sitting Balance    Level of [place] (Unsupported Sitting, Static  Balance)  minimal assist, 75% patient effort progressed CGA  -AR        Sitting Position (Unsupported Sitting, Static Balance)  sitting on edge of bed  -AR        Time Able to Maintain Position (Unsupported Sitting, Static Balance)  3 to 4 minutes  -AR           Fine Motor Testing & Training    Comment, Fine Motor Coordination  B opposition intact  -AR           Sensory Assessment/Intervention    Sensory General Assessment  other (see comments) unable to accurately assess  -AR        Additional Documentation  Vision Assessment/Intervention (Group)  -AR           Vision Assessment/Intervention    Visual Impairment/Limitations  WFL  -AR           Positioning and Restraints    Pre-Treatment Position  in bed  -AR        Post Treatment Position  bed  -AR           Pain Assessment    Additional Documentation  Pain Scale: Numbers Pre/Post-Treatment (Group)  -AR           Pain Scale: Numbers Pre/Post-Treatment    Pain Scale: Numbers, Pretreatment  0/10 - no pain  -AR        Pain Scale: Numbers, Post-Treatment  0/10 - no pain  -AR           Plan of Care Review    Plan of Care Reviewed With  patient  -AR           Clinical Impression (OT)    Date of Referral to OT  11/13/19  -AR        OT Diagnosis  decreased independence with ADLS  -AR        Patient/Family Goals Statement (OT Eval)  none stated  -AR        Criteria for Skilled Therapeutic Interventions Met (OT Eval)  yes;treatment indicated  -AR        Rehab Potential (OT Eval)  good, to achieve stated therapy goals  -AR        Therapy Frequency (OT Eval)  daily  -AR        Anticipated Discharge Disposition (OT)  inpatient rehabilitation facility  -AR           Vital Signs    Pre Systolic BP Rehab  183  -AR        Pre Treatment Diastolic BP  88  -AR        Post Systolic BP Rehab  203  -AR        Post Treatment Diastolic BP  100  -AR        Pretreatment Heart Rate (beats/min)  92  -AR        Posttreatment Heart Rate (beats/min)  98  -AR        Pre SpO2 (%)  96  -AR        O2  Delivery Pre Treatment  room air  -AR        Post SpO2 (%)  98  -AR        O2 Delivery Post Treatment  room air  -AR        Pre Patient Position  Supine  -AR        Intra Patient Position  Sitting  -AR        Post Patient Position  Supine  -AR           OT Goals    Transfer Goal Selection (OT)  transfer, OT goal 1  -AR        Dressing Goal Selection (OT)  dressing, OT goal 1  -AR        Self-Feeding Goal Selection (OT)  self feeding, OT goal 1  -AR        Balance Goal Selection (OT)  balance, OT goal 1  -AR        Additional Documentation  Balance Goal Selection (OT) (Row);Self-Feeding Goal Selection (OT) (Row)  -AR           Dressing Goal 1 (OT)    Activity/Assistive Device (Dressing Goal 1, OT)  upper body dressing  -AR        [place]/Cues Needed (Dressing Goal 1, OT)  moderate assist (50-74% patient effort)  -AR        Time Frame (Dressing Goal 1, OT)  short term goal (STG);1 week  -AR        Progress/Outcome (Dressing Goal 1, OT)  goal ongoing  -AR           Self-Feeding Goal 1 (OT)    Activity/Assistive Device (Self-Feeding Goal 1, OT)  self-feeding skills, all  -AR        [place] Level/Cues Needed (Self-Feeding Goal 1, OT)  minimum assist (75% or more patient effort);verbal cues required  -AR        Time Frame (Self-Feeding Goal 1, OT)  short term goal (STG);5 days  -AR        Progress/Outcomes (Self-Feeding Goal 1, OT)  goal ongoing  -AR           Balance Goal 1 (OT)    Activity/Assistive Device (Balance Goal 1, OT)  sitting, static  -AR        [place]ougon Level/Cues Needed (Balance Goal 1, OT)  supervision required  -AR        Time Frame (Balance Goal 1, OT)  short term goal (STG);1 week  -AR        Progress/Outcomes (Balance Goal 1, OT)  goal ongoing  -AR           OT Cognitive Goals    Orientation Goal Selection (OT)  orientation, OT goal 1  -AR           Orientation Goal 1 (OT)    Activity (Orientation Goal 1, OT)  oriented x 4  -AR        [place]/Cues/Accuracy (Orientation Goal 1, OT)   minimal cues for use of strategies  -AR        Time Frame (Orientation Goal 1, OT)  short term goal (STG);1 week  -AR        Progress/Outcome (Orientation Goal 1, OT)  goal ongoing  -AR           Living Environment    Home Accessibility  -- pt unable to provide info  -AR          User Key  (r) = Recorded By, (t) = Taken By, (c) = Cosigned By    Initials Name Effective Dates    Heidi Ramirez OT 06/22/15 -          Occupational Therapy Education     Title: PT OT SLP Therapies (Done)     Topic: Occupational Therapy (Done)     Point: ADL training (Done)     Description: Instruct learner(s) on proper safety adaptation and remediation techniques during self care or transfers.   Instruct in proper use of assistive devices.    Learning Progress Summary           Patient Acceptance, E,D, VU,NR by AR at 11/14/2019  9:00 AM                   Point: Home exercise program (Done)     Description: Instruct learner(s) on appropriate technique for monitoring, assisting and/or progressing therapeutic exercises/activities.    Learning Progress Summary           Patient Acceptance, E,D, VU,NR by AR at 11/14/2019  9:00 AM                   Point: Precautions (Done)     Description: Instruct learner(s) on prescribed precautions during self-care and functional transfers.    Learning Progress Summary           Patient Acceptance, E,D, VU,NR by AR at 11/14/2019  9:00 AM                   Point: Body mechanics (Done)     Description: Instruct learner(s) on proper positioning and spine alignment during self-care, functional mobility activities and/or exercises.    Learning Progress Summary           Patient Acceptance, E,D, VU,NR by AR at 11/14/2019  9:00 AM                               User Key     Initials Effective Dates Name Provider Type Discipline    AR 06/22/15 -  Heidi Encarnacion, OT Occupational Therapist OT                  OT Recommendation and Plan  Outcome Summary/Treatment Plan (OT)  Anticipated Discharge  Disposition (OT): inpatient rehabilitation facility  Therapy Frequency (OT Eval): daily  Plan of Care Review  Plan of Care Reviewed With: patient  Plan of Care Reviewed With: patient  Outcome Summary: Pt with initial lethargy, was oriented to self only and following commands to participate. She completed bed mobility with mod assist and max assist UB ADLS and feeding. Pt limited with c/o dizziness with EOB and brief episode of decreased alertness with EOB sitting. Pt assisted back to supine, /100. Pt with mild agitation end of session. Recommend IPR.     Outcome Measures     Row Name 11/14/19 0900             How much help from another is currently needed...    Putting on and taking off regular lower body clothing?  2  -AR      Bathing (including washing, rinsing, and drying)  2  -AR      Toileting (which includes using toilet bed pan or urinal)  2  -AR      Putting on and taking off regular upper body clothing  2  -AR      Taking care of personal grooming (such as brushing teeth)  2  -AR      Eating meals  2  -AR      AM-PAC 6 Clicks Score (OT)  12  -AR         Functional Assessment    Outcome Measure Options  AM-PAC 6 Clicks Daily Activity (OT)  -AR        User Key  (r) = Recorded By, (t) = Taken By, (c) = Cosigned By    Initials Name Provider Type    Heidi Ramirez OT Occupational Therapist          Time Calculation:   Time Calculation- OT     Row Name 11/14/19 0900             Time Calculation- OT    OT Start Time  0900  -AR      OT Received On  11/14/19  -AR      OT Goal Re-Cert Due Date  11/24/19  -AR        User Key  (r) = Recorded By, (t) = Taken By, (c) = Cosigned By    Initials Name Provider Type    Heidi Ramirez OT Occupational Therapist        Therapy Charges for Today     Code Description Service Date Service Provider Modifiers Qty    [id number]  OT EVAL MOD COMPLEXITY 4 11/14/2019 Heidi Encarnacion OT GO 1               Heidi Encarnacion OT  11/14/2019    Electronically  signed by Heidi Encarnacion OT at 11/14/19 1339     Heidi Encarnacion OT at 11/14/19 0900          Problem: Patient Care Overview  Goal: Plan of Care Review  Outcome: Ongoing (interventions implemented as appropriate)   11/14/19 0900   Coping/Psychosocial   Plan of Care Reviewed With patient   OTHER   Outcome Summary Pt with initial lethargy, was oriented to self only and following commands to participate. She completed bed mobility with mod assist and max assist UB ADLS and feeding. Pt limited with c/o dizziness with EOB and brief episode of decreased alertness with EOB sitting. Pt assisted back to supine, /100. Pt with mild agitation end of session. Recommend IPR.    Plan of Care Review   Progress improving           Electronically signed by Heidi Encarnacion OT at 11/14/19 6514

## 2019-11-15 NOTE — PROGRESS NOTES
[facility]  PROGRESS NOTE    Patient Name: Aura Fischer  : 1940  MRN: [id number]    Date of Admission: 2019  Primary Care Physician: Jose Manuel Cabrales MD    Subjective   Subjective     CC: f/u AMS    HPI: Up in bed awake. Doing very well. Feels she is back to normal aside from weakness. No complaints.    Review of Systems  Gen- No fevers, chills  CV- No chest pain, palpitations  Resp- No cough, dyspnea  GI- No N/V/D, abd pain    Objective   Objective     Vital Signs:   Temp:  [97.9 °F (36.6 °C)-98.6 °F (37 °C)] 97.9 °F (36.6 °C)  Heart Rate:  [] 112  Resp:  [16-18] 16  BP: (134-202)/(71-97) 176/88  Total (NIH Stroke Scale): 5     Physical Exam:  Constitutional: No acute distress, awake, alert, looks much better  HENT: NCAT, mucous membranes moist  Respiratory: Clear to auscultation bilaterally, respiratory effort normal   Cardiovascular: RRR, no murmurs, rubs, or gallops, palpable pedal pulses bilaterally  Gastrointestinal: Positive bowel sounds, soft, nontender, nondistended  Musculoskeletal: No bilateral ankle edema  Psychiatric: Appropriate affect, cooperative  Neurologic: Oriented x 3, strength symmetric in all extremities, Cranial Nerves grossly intact to confrontation, speech clear  Skin: No rashes    Results Reviewed:    Results from last 7 days   Lab Units 11/15/19  0844 19  0524   WBC 10*3/mm3 13.21* 12.97*   HEMOGLOBIN g/dL 12.9 12.4   HEMATOCRIT % 39.1 38.3   PLATELETS 10*3/mm3 337 293     Results from last 7 days   Lab Units 11/15/19  0844 19  0524   SODIUM mmol/L 137 137   POTASSIUM mmol/L 2.9* 3.7   CHLORIDE mmol/L 101 99   CO2 mmol/L 20.0* 22.0   BUN mg/dL 12 15   CREATININE mg/dL 0.74 0.79   GLUCOSE mg/dL 178* 184*   CALCIUM mg/dL 7.9* 8.7   ALT (SGPT) U/L  --  31   AST (SGOT) U/L  --  29   TROPONIN T ng/mL  --  <0.010     Estimated Creatinine Clearance: 52.5 mL/min (by C-G formula based on SCr of 0.74  mg/dL).    Microbiology Results Abnormal     Procedure Component Value - Date/Time    Culture, CSF - Cerebrospinal Fluid, Lumbar Puncture [[id number]] Collected:  11/13/19 1420    Lab Status:  Preliminary result Specimen:  Cerebrospinal Fluid from Lumbar Puncture Updated:  11/15/19 0929     CSF Culture No growth at 2 days     Gram Stain Few (2+) WBCs seen      No organisms seen    Blood Culture - Blood, Hand, Right [[id number]] Collected:  11/13/19 0533    Lab Status:  Preliminary result Specimen:  Blood from Hand, Right Updated:  11/15/19 0801     Blood Culture No growth at 2 days    Blood Culture - Blood, Arm, Right [[id number]] Collected:  11/13/19 0542    Lab Status:  Preliminary result Specimen:  Blood from Arm, Right Updated:  11/15/19 0801     Blood Culture No growth at 2 days    Urine Culture - Urine, Urine, Clean Catch [[id number]]  (Normal) Collected:  11/13/19 0810    Lab Status:  Final result Specimen:  Urine, Clean Catch Updated:  11/14/19 2142     Urine Culture No growth    Meningitis / Encephalitis Panel, PCR - Cerebrospinal Fluid, Lumbar Puncture [[id number]]  (Normal) Collected:  11/13/19 1420    Lab Status:  Final result Specimen:  Cerebrospinal Fluid from Lumbar Puncture Updated:  11/13/19 1628     ESCHERICHIA COLI K1, PCR Not Detected     HAEMOPHILUS INFLUENZAE, PCR Not Detected     LISTERIA MONOCYTOGENES, PCR Not Detected     NEISSERIA MENINGITIDIS, PCR Not Detected     STREPTOCOCCUS AGALACTIAE, PCR Not Detected     STREPTOCOCCUS PNEUMONIAE, PCR Not Detected     CYTOMEGALOVIRUS (CMV), PCR Not Detected     ENTEROVIRUS, PCR Not Detected     HERPES SIMPLEX VIRUS 1 (HSV-1), PCR Not Detected     HERPES SIMPLEX VIRUS 2 (HSV-2), PCR Not Detected     HUMAN PARECHOVIRUS, PCR Not Detected     VARICELLA ZOSTER VIRUS (VZV), PCR Not Detected     CRYPTOCOCCUS NEOFORMANS / GATTII, PCR Not Detected     HUMAN HERPES VIRUS 6 PCR Not Detected    Influenza A & B PCR - Swab, Nasopharynx [[id number]]  (Normal) Collected:   11/13/19 0811    Lab Status:  Final result Specimen:  Swab from Nasopharynx Updated:  11/13/19 1023     Influenza A PCR Not Detected     Influenza B PCR Not Detected            Results for orders placed during the hospital encounter of 11/13/19   Adult Transthoracic Echo Complete W/ Cont if Necessary Per Protocol (With Agitated Saline)    Narrative · Normal cardiac chamber sizes and wall thicknesses.  · Global and segmental LV wall motion is normal with an estimated LV   ejection fraction >70%.  · There is mild aortic valve sclerosis. I cannot exclude mild aortic   insufficiency.  · There is mitral annular calcification and mild mitral regurgitation.  · The estimated pulmonary artery pressure is normal.  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 This has the appearance of a fibroelastoma although I   cannot exclude myxoma, organized thrombus,or leaflet calcification from   this study.  · Agitated saline study is negative for intracardiac shunt.          I have reviewed the medications:  Scheduled Meds:  GI cocktail  Oral Once   aspirin 81 mg Oral Daily   atorvastatin 80 mg Oral Nightly   cefTRIAXone 2 g Intravenous Q24H   famotidine 20 mg Oral BID AC   insulin lispro 0-7 Units Subcutaneous 4x Daily With Meals & Nightly   levothyroxine 75 mcg Oral Daily   lidocaine 5 mL Subcutaneous Once   lisinopril 20 mg Oral Q12H   pantoprazole 40 mg Oral Q AM   saccharomyces boulardii 250 mg Oral Daily   sodium chloride 10 mL Intravenous Q12H     Continuous Infusions:  hold 1 each    sodium chloride 100 mL/hr Last Rate: 100 mL/hr (11/13/19 0717)     PRN Meds:.•  hold  •  acetaminophen **OR** acetaminophen  •  dextrose  •  dextrose  •  glucagon (human recombinant)  •  hydrALAZINE  •  magnesium sulfate **OR** magnesium sulfate **OR** magnesium sulfate  •  ondansetron  •  ondansetron  •  potassium chloride **OR** potassium chloride **OR** potassium chloride  •  promethazine  **OR** promethazine **OR** promethazine  •  QUEtiapine  •  sodium chloride      Assessment/Plan   Assessment / Plan     Active Hospital Problems    Diagnosis  POA   • Acute metabolic encephalopathy [G93.41]  Yes   • Acute UTI (urinary tract infection) [N39.0]  Yes   • Sepsis, unspecified organism (CMS/HCC) [A41.9]  Yes   • Lactic acidosis [E87.2]  Yes   • Mixed hyperlipidemia [E78.2]  Yes   • Essential hypertension [I10]  Yes   • Acquired hypothyroidism [E03.9]  Yes      Resolved Hospital Problems   No resolved problems to display.        Brief Hospital Course to date:  Aura Fischer is a 79 y.o. female who was found down nearly 20 hours after last known well. She is found to have metabolic encephalopathy likely due to viral illness. This is my first day assessing patient's active medical issues.     A/P:  --Her encephalopathy has resolved and was most likely due to acute viral illness and UTI. Continue IV rocephin for now with plan for PO cephalosporin at d/c.  --Neurology following, d/w her today. Felt to be due to above, and now that resolved will s/o.   --Her CT perfusion, CTA head and neck were largely unremarkable. May need repeat MRI but unable to cooperate at this time. Initial MRI without ischemia.  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 Now that above has resolved will order MARGARITO for further clarification.  --Reviewed BP for past 24 hours. Her HTN is uncontrolled. Now taking PO. Increase ace inhibitor. Will add prn hydralazine in event her BP is contributing to her encephalopathy.  --Add GI cocktail, pepcid, PPI for discomfort.  --CM following. Planning for rehab at d/c.     DVT Prophylaxis:  Mechanical.     Disposition: I expect the patient to be discharged to rehab in 1-3 days.    CODE STATUS:   Code Status and Medical Interventions:   Ordered at: 11/13/19 0803     Level Of Support Discussed With:    Next of Kin (If No Surrogate)     Code Status:    No CPR     Medical Interventions (Level of  Support Prior to Arrest):    Full         Electronically signed by Naheed Lowe II, , 11/15/19, 11:35 AM.

## 2019-11-15 NOTE — PLAN OF CARE
Problem: Patient Care Overview  Goal: Plan of Care Review  Outcome: Ongoing (interventions implemented as appropriate)   11/15/19 2400   Coping/Psychosocial   Plan of Care Reviewed With patient;friend   Plan of Care Review   Progress improving   SLP treatment completed. Will continue to address cognitive-communication deficits. Please see note for further details and recommendations.

## 2019-11-15 NOTE — PROGRESS NOTES
"Neurology       Patient Care Team:  Jose Manuel Cabrales MD as PCP - General  Jose Manuel Cabrales MD as PCP - [facility]  [place], Enoch Magallon MD as PCP - Claims Attributed    Chief complaint encephalopathy      Subjective .     History: Feels a lot better today, better than she has in a week.  Hazy memory of the past few days but recalls seeing me yesterday afternoon.  Denies any headache or vision trouble.  Last night he was disoriented to time and situation, speech was clear but illogical at times    ROS: No fever or chest pain    Objective     Vital Signs   Blood pressure 176/88, pulse 112, temperature 97.9 °F (36.6 °C), temperature source Axillary, resp. rate 16, height 157.5 cm (62.01\"), weight 57.4 kg (126 lb 8 oz), SpO2 96 %.    Physical Exam:              Neuro: Well-developed elderly white woman lying comfortably in hospital bed, alert, fully oriented, fluent and appropriate with no dysarthria  VFF EOMI face symmetric diminished hearing (chronic)  Moves all extremities well/symmetrically, mildly generally weak    Results Review:              CBC with White cell count 13, H&H 12.9 and 39, platelets 337  CSF culture no growth at 2 days    Assessment/Plan     Febrile illness with encephalopathy- patient at or near baseline mental status.  Now on ceftriaxone only and doing well.  Appears to have been a metabolic encephalopathy due to UTI.  Will sign off, please call if needed.    I discussed the patients findings and my recommendations with patient and primary care team    Zina Delcid MD  11/15/19  9:32 AM      "

## 2019-11-15 NOTE — PLAN OF CARE
Problem: Patient Care Overview  Goal: Plan of Care Review  Outcome: Ongoing (interventions implemented as appropriate)   11/15/19 1412   Coping/Psychosocial   Plan of Care Reviewed With patient   OTHER   Outcome Summary Pt with improvement in function and ability to participate this session. Pt completes sit to stand with CGA and ambulates with CGA and RW. Pt completed all toileting with supervision for unruly care and Corine for clothing mangement. Pt required Corine to compelte LBD. Pt stood at sink with RW to compelte grooming with CGA from OT and verbal cues for task sequencing. Pt limited by hypertension. Continue IP OT per POC.    Plan of Care Review   Progress improving

## 2019-11-15 NOTE — PLAN OF CARE
Problem: Patient Care Overview  Goal: Plan of Care Review   11/14/19 7989   Coping/Psychosocial   Plan of Care Reviewed With patient   OTHER   Outcome Summary A&Ox2 (disoriented to time/situation). Speech clear/often illogical. Bed mobility moderately impaired. /71 currently after Hydralazine administration. BP closely monitored. Frequent T&R by nursing staff in place. Call light in reach.   Plan of Care Review   Progress no change

## 2019-11-15 NOTE — THERAPY TREATMENT NOTE
Acute Care - Speech Language Pathology Treatment Note  [facility]     Patient Name: Aura Fischer  : 1940  MRN: [id number]  Today's Date: 11/15/2019         Admit Date: 2019    Visit Dx:      ICD-10-CM ICD-9-CM   1. Acute metabolic encephalopathy G93.41 348.31   2. Cognitive communication deficit R41.841 799.52   3. Impaired mobility and ADLs Z74.09 799.89     Patient Active Problem List   Diagnosis   • Rectal bleeding   • Healthcare maintenance   • Anemia   • Anxiety disorder   • Gastroesophageal reflux disease without esophagitis   • Mixed hyperlipidemia   • Essential hypertension   • Acquired hypothyroidism   • Recurrent pancreatitis   • Depression   • Osteoporosis   • Acute metabolic encephalopathy   • Acute UTI (urinary tract infection)   • Sepsis, unspecified organism (CMS/HCC)   • Lactic acidosis        Therapy Treatment  Rehabilitation Treatment Summary     Row Name 11/15/19 0955             Treatment Time/Intention    Discipline  speech language pathologist  (Pended)   -KK      Document Type  therapy note (daily note)  (Pended)   -KK      Subjective Information  no complaints  (Pended)   -KK      Mode of Treatment  speech-language pathology  (Pended)   -KK      Patient/Family Observations   present  (Pended)   -KK      Care Plan Review  care plan/treatment goals reviewed;patient/other agree to care plan  (Pended)   -KK      Therapy Frequency (SLP SLC)  5 days per week  (Pended)   -KK      Patient Effort  good  (Pended)   -KK      Recorded by [KK] Christine Da Silva, Speech Therapy Student 11/15/19 1036      Row Name 11/15/19 0955             Outcome Summary/Treatment Plan (SLP)    Daily Summary of Progress (SLP)  progress toward functional goals is good  (Pended)   -KK      Plan for Continued Treatment (SLP)  Pt and family friend report significant improvement. Still dem word finding difficulty. Will adjust goals and continue to follow up.  (Pended)   -KK      Anticipated Dischage  Disposition  inpatient rehabilitation facility;anticipate therapy at next level of care  (Pended)   -KK      Recorded by [KK] Christine Da Silva, Speech Therapy Student 11/15/19 1036        User Key  (r) = Recorded By, (t) = Taken By, (c) = Cosigned By    Initials Name Effective Dates Discipline    KK Christine Da Silva, Speech Therapy Student 07/26/19 -  SLP          EDUCATION  The patient has been educated in the following areas:   Cognitive Impairment Communication Impairment.    SLP Recommendation and Plan  Daily Summary of Progress (SLP): (P) progress toward functional goals is good     Plan for Continued Treatment (SLP): (P) Pt and family friend report significant improvement. Still dem word finding difficulty. Will adjust goals and continue to follow up.  Anticipated Dischage Disposition: (P) inpatient rehabilitation facility, anticipate therapy at next level of care             SLP GOALS     Row Name 11/15/19 0955 11/14/19 1530 11/13/19 1030       Words/Phrases/Sentences Goal 1 (SLP)    Improve Ability to Comprehend Words/Phrases/Sentences Through: Goal 1 (SLP)  --  identify objects, field of;identify body part;80%;with minimal cues (75-90%)  -AC (r) CW (t) AC (c)  identify objects, field of;identify body part;80%;with minimal cues (75-90%) 2  -RD    Time Frame (Identify Objects and Pictures Goal 1, SLP)  --  short term goal (STG);by discharge  -AC (r) CW (t) AC (c)  short term goal (STG);by discharge  -RD    Progress (Ability to Contruct Words/Phrases/Sentences Goal 1, SLP)  --  100%;independently (over 90% accuracy)  -AC (r) CW (t) AC (c)  --    Progress/Outcomes (Identify Objects and Pictures Goal 1, SLP)  --  goal met  -AC (r) CW (t) AC (c)  --       Comprehend Questions Goal 1 (SLP)    Improve Ability to Comprehend Questions Goal 1 (SLP)  complex yes/no questions;complex wh questions;80%;with minimal cues (75-90%)  (Pended)   -KK  complex yes/no questions;complex wh questions;80%;with minimal cues (75-90%)  -AC  (r) CW (t) AC (c)  simple yes/no questions;simple wh questions;80%;with minimal cues (75-90%)  -RD    Time Frame (Comprehend Questions Goal 1, SLP)  short term goal (STG);by discharge  (Pended)   -KK  short term goal (STG);by discharge  -AC (r) CW (t) AC (c)  short term goal (STG);by discharge  -RD    Barriers (Comprehend Questions Goal 1, SLP)  --  Pt met goals for simple Y/N and Wh questions.   -AC (r) CW (t) AC (c)  --    Progress (Ability to Comprehend Questions Goal 1, SLP)  70%;with minimal cues (75-90%)  (Pended)   -KK  --  --    Progress/Outcomes (Comprehend Questions Goal 1, SLP)  continuing progress toward goal  (Pended)   -KK  --  --       Follow Directions Goal 2 (SLP)    Improve Ability to Follow Directions Goal 1 (SLP)  2 step commands;80%;with minimal cues (75-90%)  (Pended)   -KK  1 step direction with objects;1 step direction without objects;80%;with minimal cues (75-90%)  -AC (r) CW (t) AC (c)  1 step direction with objects;1 step direction without objects;80%;with minimal cues (75-90%)  -RD    Time Frame (Follow Directions Goal 1, SLP)  short term goal (STG);by discharge  (Pended)   -KK  short term goal (STG);by discharge  -AC (r) CW (t) AC (c)  short term goal (STG);by discharge  -RD    Barriers (Improve Ability to Follow Directions Goal 1, SLP)  Pt met 1 step goal, req cues for 2 step.  (Pended)   -KK  --  --    Progress/Outcomes (Follow Directions Goal 1, SLP)  goal revised this date  (Pended)   -KK  goal ongoing  -AC (r) CW (t) AC (c)  --       Word Retrieval Skills Goal 1 (SLP)    Improve Word Retrieval Skills By Goal 1 (SLP)  complex;responsive naming task;completing a divergent task;completing a convergent task;with minimal cues (75-90%)  (Pended)   -KK  complex;responsive naming task;completing a divergent task;completing a convergent task;with minimal cues (75-90%)  -AC (r) CW (t) AC (c)  completing automatic speech task, counting;completing automatic speech task, alphabet;completing  automatic speech task, days of the week;responsive naming task;simple;answer WH question with one word;80%  -RD    Time Frame (Word Retrieval Goal 1, SLP)  short term goal (STG);by discharge  (Pended)   -KK  short term goal (STG);by discharge  -AC (r) CW (t) AC (c)  short term goal (STG);by discharge  -RD    Barriers (Word Retrieval Goal 1, SLP)  --  Met goal for simple responsive naming, automatic speech task, and answering question w/ one word w/ 100% accuracy independently.   -AC (r) CW (t) AC (c)  --    Progress (Word Retrieval Skills Goal 1, SLP)  60%;with minimal cues (75-90%)  (Pended)   -KK  --  --    Progress/Outcomes (Word Retrieval Goal 1, SLP)  continuing progress toward goal  (Pended)   -KK  --  --       Additional Goal 1 (SLP)    Additional Goal 1, SLP  LTG: Pt will improve cognitive-communication skills to actively participate in care w/ 100% accuracy w/o cues  (Pended)   -KK  LTG: Pt will improve cognitive-communication skills to actively participate in care w/ 100% accuracy w/o cues  -AC (r) CW (t) AC (c)  LTG: Pt will improve cognitive-communication skills to actively participate in care w/ 100% accuracy w/o cues  -RD    Time Frame (Additional Goal 1, SLP)  by discharge  (Pended)   -KK  by discharge  -AC (r) CW (t) AC (c)  by discharge  -RD    Progress/Outcomes (Additional Goal 1, SLP)  good progress toward goal  (Pended)   -KK  good progress toward goal  -AC (r) CW (t) AC (c)  --      User Key  (r) = Recorded By, (t) = Taken By, (c) = Cosigned By    Initials Name Provider Type    Ashleigh Blankenship, MS CCC-SLP Speech and Language Pathologist    RD Fara Mccarthy, MS CCC-SLP Speech and Language Pathologist    KK Christine Da Silva, Speech Therapy Student Speech Therapy Student    CW Kenisha Lopez, Speech Therapy Student Speech Therapy Student              Time Calculation:     Time Calculation- SLP     Row Name 11/15/19 1042             Time Calculation- SLP    SLP Start Time  0955  (Pended)    -AMARILIS      SLP Received On  11/15/19  (Pended)   -AMARILIS        User Key  (r) = Recorded By, (t) = Taken By, (c) = Cosigned By    Initials Name Provider Type    Christine Morelos, Speech Therapy Student Speech Therapy Student          Therapy Charges for Today     Code Description Service Date Service Provider Modifiers Qty    [id number] HC ST TREATMENT SPEECH 2 11/15/2019 Christine Da Silva, Speech Therapy Student GN 1                     Christine Da Silva Speech Therapy Student  11/15/2019

## 2019-11-15 NOTE — THERAPY TREATMENT NOTE
Acute Care - Occupational Therapy Treatment Note  [facility]     Patient Name: Aura Fischer  : 1940  MRN: [id number]  Today's Date: 11/15/2019  Onset of Illness/Injury or Date of Surgery: 19  Date of Referral to OT: 19  Referring Physician: Dr. Lua    Admit Date: 2019       ICD-10-CM ICD-9-CM   1. Acute metabolic encephalopathy G93.41 348.31   2. Cognitive communication deficit R41.841 799.52   3. Impaired mobility and ADLs Z74.09 799.89     Patient Active Problem List   Diagnosis   • Rectal bleeding   • Healthcare maintenance   • Anemia   • Anxiety disorder   • Gastroesophageal reflux disease without esophagitis   • Mixed hyperlipidemia   • Essential hypertension   • Acquired hypothyroidism   • Recurrent pancreatitis   • Depression   • Osteoporosis   • Acute metabolic encephalopathy   • Acute UTI (urinary tract infection)   • Sepsis, unspecified organism (CMS/HCC)   • Lactic acidosis     Past Medical History:   Diagnosis Date   • Anemia    • Diverticulosis    • Hyperlipidemia    • Hypertension    • Internal hemorrhoid    • Mood disorder (CMS/HCC)    • Thyroid disease      Past Surgical History:   Procedure Laterality Date   • BREAST SURGERY     • CHOLECYSTECTOMY     • HEMORRHOIDECTOMY     • HYSTERECTOMY     • PANCREATECTOMY     • SPLENECTOMY         Therapy Treatment    Rehabilitation Treatment Summary     Row Name 11/15/19 1412 11/15/19 0955          Treatment Time/Intention    Discipline  occupational therapist  -  speech language pathologist  -AMARILIS ROMO,CLARISSA2     Document Type  therapy note (daily note)  -  therapy note (daily note)  -AMARILIS ROMO,AV2     Subjective Information  complains of;weakness  -  no complaints  -AMARILIS ROMO,AV2     Mode of Treatment  individual therapy;occupational therapy  -  speech-language pathology  -AMARILIS ROMO,CLARISSA2     Patient/Family Observations  Pt received upright in recliner with IV intact.   -   present  -AMARILIS ROMO,CLARISSA2     Care Plan Review  care plan/treatment  goals reviewed;risks/benefits reviewed;patient/other agree to care plan  -HK  care plan/treatment goals reviewed;patient/other agree to care plan  -AV,KK,AV2     Therapy Frequency (SLP SLC)  --  5 days per week  -AV,KK,AV2     Patient Effort  good  -HK  good  -AV,KK,AV2     Comment  hypertensive; RN aware  -HK  --     Existing Precautions/Restrictions  fall  -HK  --     Recorded by [HK] Keila Le, OT 11/15/19 1531 [AV,KK,AV2] Sara Robles, MS CCC-SLP (r) Christine Da Silva, Speech Therapy Student (t) Sara Robles, MS CCC-SLP (c) 11/15/19 1319     Row Name 11/15/19 1412             Vital Signs    Pre Systolic BP Rehab  174  -HK      Pre Treatment Diastolic BP  85  -HK      Post Systolic BP Rehab  169  -HK      Post Treatment Diastolic BP  76  -HK      Pretreatment Heart Rate (beats/min)  98  -HK      Posttreatment Heart Rate (beats/min)  65  -HK      Pre Patient Position  Sitting  -HK      Intra Patient Position  Standing  -HK      Post Patient Position  Sitting  -HK      Recorded by [HK] Keila Le, OT 11/15/19 1531      Row Name 11/15/19 1412             Cognitive Assessment/Intervention    Additional Documentation  Cognitive Assessment/Intervention (Group)  -HK      Recorded by [HK] Keila Le, OT 11/15/19 1531      Row Name 11/15/19 1412             Cognitive Assessment/Intervention- PT/OT    Affect/Mental Status (Cognitive)  WFL  -HK      Orientation Status (Cognition)  oriented x 4  -HK      Follows Commands (Cognition)  follows one step commands;over 90% accuracy  -HK      Cognitive Function (Cognitive)  safety deficit;attention deficit  -HK      Attention Deficit (Cognitive)  mild deficit;distractible in noisy environment;distractible in quiet environment;concentration  -HK      Safety Deficit (Cognitive)  mild deficit;safety precautions follow-through/compliance;safety precautions awareness;judgment;insight into deficits/self awareness;awareness of need for assistance  -HK       Recorded by [HK] Keila Le, OT 11/15/19 1531      Row Name 11/15/19 1412             Safety Issues, Functional Mobility    Safety Issues Affecting Function (Mobility)  safety precautions follow-through/compliance;safety precaution awareness;insight into deficits/self awareness;awareness of need for assistance  -HK      Impairments Affecting Function (Mobility)  balance;strength;endurance/activity tolerance  -HK      Recorded by [HK] Keila Le, OT 11/15/19 1531      Row Name 11/15/19 1412             Bed Mobility Assessment/Treatment    Comment (Bed Mobility)  Pt received and left UI   -HK      Recorded by [HK] Keila Le, OT 11/15/19 1531      Row Name 11/15/19 1412             Functional Mobility    Functional Mobility- Ind. Level  contact guard assist;verbal cues required  -HK      Functional Mobility- Device  rolling walker  -HK      Functional Mobility-Distance (Feet)  20  -HK      Functional Mobility- Safety Issues  step length decreased;weight-shifting ability decreased  -HK      Functional Mobility- Comment  Pt ambulated from chair toilet, toilet to sink, sink to chair.   -HK      Recorded by [HK] Keila Le, OT 11/15/19 1531      Row Name 11/15/19 1412             Transfer Assessment/Treatment    Transfer Assessment/Treatment  sit-stand transfer;stand-sit transfer;toilet transfer  -HK      Comment (Transfers)  Verbal cues for safe hand placement and sequencing.   -HK      Recorded by [HK] Keila Le, OT 11/15/19 1531      Row Name 11/15/19 1412             Sit-Stand Transfer    Sit-Stand [place] (Transfers)  contact guard;verbal cues  -HK      Assistive Device (Sit-Stand Transfers)  walker, front-wheeled  -HK      Recorded by [HK] Keila Le, OT 11/15/19 1531      Row Name 11/15/19 1412             Stand-Sit Transfer    Stand-Sit Weber (Transfers)  contact guard;verbal cues  -HK      Assistive Device (Stand-Sit Transfers)  walker, front-wheeled  -HK      Recorded by [HK]  Keila Le, OT 11/15/19 1531      Row Name 11/15/19 1412             Toilet Transfer    Type (Toilet Transfer)  sit-stand;stand-sit  -HK      [place] Level (Toilet Transfer)  contact guard;verbal cues  -HK      Assistive Device (Toilet Transfer)  grab bars/safety frame;raised toilet seat  -HK      Recorded by [HK] Keila Le, OT 11/15/19 1531      Row Name 11/15/19 1412             ADL Assessment/Intervention    BADL Assessment/Intervention  lower body dressing;toileting;grooming  -HK      Recorded by [HK] Keila Le, OT 11/15/19 1531      Row Name 11/15/19 1412             Lower Body Dressing Assessment/Training    Lower Body Dressing [place] Level  don;undergarment;minimum assist (75% patient effort);verbal cues  -HK      Lower Body Dressing Position  unsupported sitting;unsupported standing  -HK      Comment (Lower Body Dressing)  Pt required Corine to complete LBD and pull underwear over hips.   -HK      Recorded by [HK] Keila Le, OT 11/15/19 1531      Row Name 11/15/19 1412             Grooming Assessment/Training    [place] Level (Grooming)  oral care regimen;hair care, combing/brushing;wash face, hands;supervision;verbal cues  -HK      Grooming Position  supported standing  -HK      Comment (Grooming)  Pt stood at sink with RW to complete all grooming with supervision and verbal cues for sequencing and to complete each task.   -HK      Recorded by [HK] Keila eL, OT 11/15/19 1531      Row Name 11/15/19 1412             Toileting Assessment/Training    [place] Level (Toileting)  perform perineal hygiene;supervision;adjust/manage clothing;minimum assist (75% patient effort);verbal cues  -HK      Toileting Position  unsupported sitting;supported standing  -HK      Comment (Toileting)  Pt completed unruly care with supervision and required Corine for clothing management.   -HK      Recorded by [HK] Keila Le, OT 11/15/19 1531      Row Name 11/15/19 1412             BADL  Safety/Performance    Impairments, BADL Safety/Performance  balance;strength;endurance/activity tolerance  -HK      Cognitive Impairments, BADL Safety/Performance  awareness, need for assistance;judgment;safety precaution awareness;safety precaution follow-through;sequencing abilities;insight into deficits/self awareness  -HK      Recorded by [HK] Keila Le, OT 11/15/19 1531      Row Name 11/15/19 1412             General ROM    RT Upper Ext  --  -HK      Recorded by [HK] Keila Le, OT 11/15/19 1531      Row Name 11/15/19 1412             Motor Skills Assessment/Interventions    Additional Documentation  Balance (Group)  -HK      Recorded by [HK] Keila Le, OT 11/15/19 1531      Row Name 11/15/19 1412             Balance    Balance  static sitting balance;static standing balance;dynamic standing balance;dynamic sitting balance  -HK      Recorded by [HK] Keila Le, OT 11/15/19 1531      Row Name 11/15/19 1412             Static Sitting Balance    Level of [place] (Unsupported Sitting, Static Balance)  supervision  -HK      Sitting Position (Unsupported Sitting, Static Balance)  sitting in chair  -HK      Time Able to Maintain Position (Unsupported Sitting, Static Balance)  2 to 3 minutes  -HK      Recorded by [HK] Keila Le, OT 11/15/19 1531      Row Name 11/15/19 1412             Dynamic Sitting Balance    Level of [place], Reaches Outside Midline (Sitting, Dynamic Balance)  supervision  -HK      Sitting Position, Reaches Outside Midline (Sitting, Dynamic Balance)  other (see comments) on toilet  -HK      Comment, Reaches Outside Midline (Sitting, Dynamic Balance)  completing unruly care and LBD   -HK      Recorded by [HK] Keila Le, OT 11/15/19 1531      Row Name 11/15/19 1412             Static Standing Balance    Level of [place] (Supported Standing, Static Balance)  contact guard assist  -HK      Time Able to Maintain Position (Supported Standing, Static Balance)  1 to 2  minutes  -HK      Assistive Device Utilized (Supported Standing, Static Balance)  walker, rolling  -HK      Recorded by [HK] Keila Le, OT 11/15/19 1531      Row Name 11/15/19 1412             Dynamic Standing Balance    Level of [place], Reaches Outside Midline (Standing, Dynamic Balance)  contact guard assist  -HK      Time Able to Maintain Position, Reaches Outside Midline (Standing, Dynamic Balance)  more than 5 minutes  -HK      Assistive Device Utilized (Supported Standing, Dynamic Balance)  walker, rolling  -HK      Comment, Reaches Outside Midline (Standing, Dynamic Balance)  at sink compelting grooming   -HK      Recorded by [HK] Keila Le, OT 11/15/19 1531      Row Name 11/15/19 1412             Positioning and Restraints    Pre-Treatment Position  sitting in chair/recliner  -HK      Post Treatment Position  chair  -HK      In Chair  notified nsg;reclined;call light within reach;encouraged to call for assist;exit alarm on  -HK      Recorded by [] Keila Le, OT 11/15/19 1531      Row Name 11/15/19 1412             Pain Assessment    Additional Documentation  Pain Scale: FACES Pre/Post-Treatment (Group)  -HK      Recorded by [HK] Keila Le, OT 11/15/19 1531      Row Name 11/15/19 1412             Pain Scale: FACES Pre/Post-Treatment    Pain: FACES Scale, Pretreatment  0-->no hurt  -HK      Pain: FACES Scale, Post-Treatment  0-->no hurt  -HK      Recorded by [HK] Keila Le, OT 11/15/19 1531      Row Name 11/15/19 1412             Coping    Observed Emotional State  accepting;calm;cooperative  -HK      Recorded by [HK] Keila Le, OT 11/15/19 1531      Row Name 11/15/19 1412             Plan of Care Review    Plan of Care Reviewed With  patient  -HK      Recorded by [] Keila Le, OT 11/15/19 1531      Row Name 11/15/19 1412             Outcome Summary/Treatment Plan (OT)    Daily Summary of Progress (OT)  progress toward functional goals as expected  -HK      Recorded by  [HK] Keila Le, OT 11/15/19 1531      Row Name 11/15/19 0955             Outcome Summary/Treatment Plan (SLP)    Daily Summary of Progress (SLP)  progress toward functional goals is good  -AV,KK,AV2      Plan for Continued Treatment (SLP)  Pt and family friend report significant improvement. Still dem word finding difficulty. Will adjust goals and continue to follow up.  -AV,KK,AV2      Anticipated Dischage Disposition  inpatient rehabilitation facility;anticipate therapy at next level of care  -AV,KK,AV2      Recorded by [CLARISSA,KK,AV2] Sara Robles, MS CCC-SLP (r) Christine Da Silva, Speech Therapy Student (t) Sara Robles MS CCC-SLP (c) 11/15/19 1312        User Key  (r) = Recorded By, (t) = Taken By, (c) = Cosigned By    Initials Name Effective Dates Discipline    AV Sara Robles MS CCC-SLP 05/23/19 -  SLP     Keila Le, OT 03/07/18 -  OT    Christine Morelos, Speech Therapy Student 07/26/19 -  SLP           Rehab Goal Summary     Row Name 11/15/19 0955             Comprehend Questions Goal 1 (SLP)    Improve Ability to Comprehend Questions Goal 1 (SLP)  complex yes/no questions;complex wh questions;80%;with minimal cues (75-90%)  -AV (r) KK (t) AV (c)      Time Frame (Comprehend Questions Goal 1, SLP)  short term goal (STG);by discharge  -AV (r) KK (t) AV (c)      Progress (Ability to Comprehend Questions Goal 1, SLP)  70%;with minimal cues (75-90%)  -AV (r) KK (t) AV (c)      Progress/Outcomes (Comprehend Questions Goal 1, SLP)  continuing progress toward goal  -AV (r) KK (t) AV (c)         Follow Directions Goal 2 (SLP)    Improve Ability to Follow Directions Goal 1 (SLP)  2 step commands;80%;with minimal cues (75-90%)  -AV (r) KK (t) AV (c)      Time Frame (Follow Directions Goal 1, SLP)  short term goal (STG);by discharge  -AV (r) KK (t) AV (c)      Barriers (Improve Ability to Follow Directions Goal 1, SLP)  Pt met 1 step goal, req cues for 2 step.  -AV (r) KK (t) AV  (c)      Progress/Outcomes (Follow Directions Goal 1, SLP)  goal revised this date  -AV (r) KK (t) AV (c)         Word Retrieval Skills Goal 1 (SLP)    Improve Word Retrieval Skills By Goal 1 (SLP)  complex;responsive naming task;completing a divergent task;completing a convergent task;with minimal cues (75-90%)  -AV (r) KK (t) AV (c)      Time Frame (Word Retrieval Goal 1, SLP)  short term goal (STG);by discharge  -AV (r) KK (t) AV (c)      Progress (Word Retrieval Skills Goal 1, SLP)  60%;with minimal cues (75-90%)  -AV (r) KK (t) AV (c)      Progress/Outcomes (Word Retrieval Goal 1, SLP)  continuing progress toward goal  -AV (r) KK (t) AV (c)         Additional Goal 1 (SLP)    Additional Goal 1, SLP  LTG: Pt will improve cognitive-communication skills to actively participate in care w/ 100% accuracy w/o cues  -AV (r) KK (t) AV (c)      Time Frame (Additional Goal 1, SLP)  by discharge  -AV (r) KK (t) AV (c)      Progress/Outcomes (Additional Goal 1, SLP)  good progress toward goal  -AV (r) KK (t) AV (c)        User Key  (r) = Recorded By, (t) = Taken By, (c) = Cosigned By    Initials Name Provider Type Discipline    AV Sara Robles, MS CCC-SLP Speech and Language Pathologist SLP    Christine Morelos, Speech Therapy Student Speech Therapy Student SLP        Occupational Therapy Education     Title: PT OT SLP Therapies (Done)     Topic: Occupational Therapy (Done)     Point: ADL training (Done)     Description: Instruct learner(s) on proper safety adaptation and remediation techniques during self care or transfers.   Instruct in proper use of assistive devices.    Learning Progress Summary           Patient Acceptance, JOSÉ MIGUEL, ROSALIA,NR by MITUL at 11/15/2019  2:12 PM    Comment:  Pt educated on ADL retraining with toileting, grooming, and LBD. Pt educated on safety precautions and appropriate body mechanics.    Acceptance, DIANA VALDEZ, VU,NR by AR at 11/14/2019  9:00 AM                   Point: Home exercise program  (Done)     Description: Instruct learner(s) on appropriate technique for monitoring, assisting and/or progressing therapeutic exercises/activities.    Learning Progress Summary           Patient Acceptance, E,D, VU,NR by AR at 11/14/2019  9:00 AM                   Point: Precautions (Done)     Description: Instruct learner(s) on prescribed precautions during self-care and functional transfers.    Learning Progress Summary           Patient Acceptance, E, VU,NR by HK at 11/15/2019  2:12 PM    Comment:  Pt educated on ADL retraining with toileting, grooming, and LBD. Pt educated on safety precautions and appropriate body mechanics.    Acceptance, E,D, VU,NR by AR at 11/14/2019  9:00 AM                   Point: Body mechanics (Done)     Description: Instruct learner(s) on proper positioning and spine alignment during self-care, functional mobility activities and/or exercises.    Learning Progress Summary           Patient Acceptance, E, VU,NR by HK at 11/15/2019  2:12 PM    Comment:  Pt educated on ADL retraining with toileting, grooming, and LBD. Pt educated on safety precautions and appropriate body mechanics.    Acceptance, E,D, VU,NR by AR at 11/14/2019  9:00 AM                               User Key     Initials Effective Dates Name Provider Type Discipline    AR 06/22/15 -  Heidi Encarnacion, OT Occupational Therapist OT     03/07/18 -  Keila Le OT Occupational Therapist OT                OT Recommendation and Plan  Outcome Summary/Treatment Plan (OT)  Daily Summary of Progress (OT): progress toward functional goals as expected  Daily Summary of Progress (OT): progress toward functional goals as expected  Plan of Care Review  Plan of Care Reviewed With: patient  Plan of Care Reviewed With: patient  Outcome Summary: Pt with improvement in function and ability to participate this session. Pt completes sit to stand with CGA and ambulates with CGA and RW. Pt completed all toileting with supervision for unruly  care and Corine for clothing mangement. Pt required Corine to compelte LBD. Pt stood at sink with RW to compelte grooming with CGA from OT and verbal cues for task sequencing. Pt limited by hypertension. Continue IP OT per POC.   Outcome Measures     Row Name 11/15/19 1412 11/14/19 0900          How much help from another is currently needed...    Putting on and taking off regular lower body clothing?  3  -HK  2  -AR     Bathing (including washing, rinsing, and drying)  2  -HK  2  -AR     Toileting (which includes using toilet bed pan or urinal)  3  -HK  2  -AR     Putting on and taking off regular upper body clothing  4  -HK  2  -AR     Taking care of personal grooming (such as brushing teeth)  3  -HK  2  -AR     Eating meals  4  -HK  2  -AR     AM-PAC 6 Clicks Score (OT)  19  -HK  12  -AR        Functional Assessment    Outcome Measure Options  AM-PAC 6 Clicks Daily Activity (OT)  -HK  AM-PAC 6 Clicks Daily Activity (OT)  -AR       User Key  (r) = Recorded By, (t) = Taken By, (c) = Cosigned By    Initials Name Provider Type    Heidi Ramirez OT Occupational Therapist    HK Keila Le, OT Occupational Therapist           Time Calculation:   Time Calculation- OT     Row Name 11/15/19 1412             Time Calculation- OT    OT Start Time  1412  -HK      OT Received On  11/15/19  -         Timed Charges    17650 - OT Self Care/Mgmt Minutes  24  -HK        User Key  (r) = Recorded By, (t) = Taken By, (c) = Cosigned By    Initials Name Provider Type     Keila Le, OT Occupational Therapist        Therapy Charges for Today     Code Description Service Date Service Provider Modifiers Qty    [id number] HC OT SELF CARE/MGMT/TRAIN EA 15 MIN 11/15/2019 Keila Le, OT GO 2               Keila Le OT  11/15/2019

## 2019-11-15 NOTE — PLAN OF CARE
Problem: Patient Care Overview  Goal: Plan of Care Review  Outcome: Ongoing (interventions implemented as appropriate)   11/15/19 7838   Coping/Psychosocial   Plan of Care Reviewed With patient   OTHER   Outcome Summary pt has been alert and oriented today; able to participate with therapy very well; IV hydralazine given for elevated bp; specialty bed; pt c/o esophagus pain-notified bony Isaac started. only happens when eating. Potassium given for replacement.    Plan of Care Review   Progress improving     Goal: Individualization and Mutuality  Outcome: Ongoing (interventions implemented as appropriate)    Goal: Discharge Needs Assessment  Outcome: Ongoing (interventions implemented as appropriate)    Goal: Interprofessional Rounds/Family Conf  Outcome: Ongoing (interventions implemented as appropriate)      Problem: Fall Risk (Adult)  Goal: Identify Related Risk Factors and Signs and Symptoms  Outcome: Ongoing (interventions implemented as appropriate)    Goal: Absence of Fall  Outcome: Ongoing (interventions implemented as appropriate)      Problem: Skin Injury Risk (Adult)  Goal: Identify Related Risk Factors and Signs and Symptoms  Outcome: Ongoing (interventions implemented as appropriate)    Goal: Skin Health and Integrity  Outcome: Ongoing (interventions implemented as appropriate)      Problem: Confusion, Acute (Adult)  Goal: Identify Related Risk Factors and Signs and Symptoms  Outcome: Ongoing (interventions implemented as appropriate)    Goal: Cognitive/Functional Impairments Minimized  Outcome: Ongoing (interventions implemented as appropriate)    Goal: Safety  Outcome: Ongoing (interventions implemented as appropriate)

## 2019-11-15 NOTE — THERAPY EVALUATION
Patient Name: Aura Fischer  : 1940    MRN: [id number]                              Today's Date: 11/15/2019       Admit Date: 2019    Visit Dx:     ICD-10-CM ICD-9-CM   1. Acute metabolic encephalopathy G93.41 348.31   2. Cognitive communication deficit R41.841 799.52   3. Impaired mobility and ADLs Z74.09 799.89     Patient Active Problem List   Diagnosis   • Rectal bleeding   • Healthcare maintenance   • Anemia   • Anxiety disorder   • Gastroesophageal reflux disease without esophagitis   • Mixed hyperlipidemia   • Essential hypertension   • Acquired hypothyroidism   • Recurrent pancreatitis   • Depression   • Osteoporosis   • Acute metabolic encephalopathy   • Acute UTI (urinary tract infection)   • Sepsis, unspecified organism (CMS/HCC)   • Lactic acidosis     Past Medical History:   Diagnosis Date   • Anemia    • Diverticulosis    • Hyperlipidemia    • Hypertension    • Internal hemorrhoid    • Mood disorder (CMS/HCC)    • Thyroid disease      Past Surgical History:   Procedure Laterality Date   • BREAST SURGERY     • CHOLECYSTECTOMY     • HEMORRHOIDECTOMY     • HYSTERECTOMY     • PANCREATECTOMY     • SPLENECTOMY       General Information     Row Name 11/15/19 1531          PT Evaluation Time/Intention    Document Type  evaluation  -SC     Mode of Treatment  physical therapy  -SC     Row Name 11/15/19 1531          General Information    Patient Profile Reviewed?  yes found unresponsive. Admitted  with encephalopathy  -SC     Prior Level of Function  independent:;gait  -SC     Existing Precautions/Restrictions  fall  -SC     Row Name 11/15/19 1531          Relationship/Environment    Lives With  alone  -SC     Row Name 11/15/19 1531          Resource/Environmental Concerns    Current Living Arrangements  home/apartment/condo  -SC     Row Name 11/15/19 1531          Cognitive Assessment/Intervention- PT/OT    Orientation Status (Cognition)  oriented x 4  -SC     Cognitive Assessment/Intervention  Comment  alert, following commands  -SC     Row Name 11/15/19 1531          Safety Issues, Functional Mobility    Impairments Affecting Function (Mobility)  endurance/activity tolerance;balance  -SC     Comment, Safety Issues/Impairments (Mobility)  + dizziness  -SC       User Key  (r) = Recorded By, (t) = Taken By, (c) = Cosigned By    Initials Name Provider Type    SC Tammie Langley, PT Physical Therapist        Mobility     Row Name 11/15/19 1534          Bed Mobility Assessment/Treatment    Bed Mobility Assessment/Treatment  supine-sit;scooting/bridging  -SC     Scooting/Bridging [facility] (Bed Mobility)  independent  -SC     Supine-Sit [facility] (Bed Mobility)  verbal cues;supervision  -SC     Assistive Device (Bed Mobility)  bed rails;head of bed elevated  -SC     Row Name 11/15/19 1534          Sit-Stand Transfer    Sit-Stand [facility] (Transfers)  verbal cues;contact guard  -SC     Assistive Device (Sit-Stand Transfers)  -- gt belt and hand held assist  -SC     Row Name 11/15/19 1543          Gait/Stairs Assessment/Training    Gait/Stairs Assessment/Training  gait/ambulation independence  -SC     [facility] Level (Gait)  contact guard  -SC     Distance in Feet (Gait)  120  -SC     Comment (Gait/Stairs)  demonstrated unsteady gait. requiring hand held assist and IV pole assist  -SC       User Key  (r) = Recorded By, (t) = Taken By, (c) = Cosigned By    Initials Name Provider Type    SC Tammie Langley, PT Physical Therapist        Obj/Interventions     Row Name 11/15/19 1536          General ROM    GENERAL ROM COMMENTS  wfl  -SC     Row Name 11/15/19 1536          MMT (Manual Muscle Testing)    General MMT Comments  B LE grossly 4+/5, face symetrical, tongue slight R   -SC     Row Name 11/15/19 1536          Dynamic Standing Balance    Level of [facility], Reaches Outside Midline (Standing, Dynamic Balance)  contact guard assist  -SC     Time Able to Maintain Position, Reaches Outside Midline  (Standing, Dynamic Balance)  more than 5 minutes hand held assist and IV pole  -[facility] Name 11/15/19 1536          Sensory Assessment/Intervention    Sensory General Assessment  no sensation deficits identified able to visually track, no double vision  -SC       User Key  (r) = Recorded By, (t) = Taken By, (c) = Cosigned By    Initials Name Provider Type    SC Shivam, Shearon A, PT Physical Therapist        Goals/Plan     [facility] Name 11/15/19 1543          Bed Mobility Goal 1 (PT)    Activity/Assistive Device (Bed Mobility Goal 1, PT)  bed mobility activities, all  -SC     [place] Level/Cues Needed (Bed Mobility Goal 1, PT)  independent  -SC     Time Frame (Bed Mobility Goal 1, PT)  long term goal (LTG);10 days  -[facility] Name 11/15/19 1543          Transfer Goal 1 (PT)    Activity/Assistive Device (Transfer Goal 1, PT)  transfers, all;walker, rolling  -SC     M[place]y Level/Cues Needed (Transfer Goal 1, PT)  conditional independence  -SC     Time Frame (Transfer Goal 1, PT)  long term goal (LTG);10 days  -SC     Row Name 11/15/19 1543          Gait Training Goal 1 (PT)    Activity/Assistive Device (Gait Training Goal 1, PT)  gait (walking locomotion);walker, rolling  -SC     [place]y Level (Gait Training Goal 1, PT)  conditional independence  -SC     Time Frame (Gait Training Goal 1, PT)  long term goal (LTG);10 days  -SC       User Key  (r) = Recorded By, (t) = Taken By, (c) = Cosigned By    Initials Name Provider Type    SC Shivam, Shearon A, PT Physical Therapist        Clinical Impression     [facility] Name 11/15/19 7864          Pain Assessment    Additional Documentation  Pain Scale: FACES Pre/Post-Treatment (Group)  -[facility] Name 11/15/19 1478          Pain Scale: Numbers Pre/Post-Treatment    Pain Scale: Numbers, Pretreatment  0/10 - no pain  -SC     Pain Scale: Numbers, Post-Treatment  0/10 - no pain  -SC     Row Name 11/15/19 4744          Plan of Care Review    Plan of Care Reviewed With   patient  -SC     Row Name 11/15/19 1540          Physical Therapy Clinical Impression    Criteria for Skilled Interventions Met (PT Clinical Impression)  yes;treatment indicated  -SC     Rehab Potential (PT Clinical Summary)  good, to achieve stated therapy goals  -SC     Row Name 11/15/19 1540          Vital Signs    Intra Systolic BP Rehab  215  -SC     Intra Treatment Diastolic BP  100  -SC     Post Systolic BP Rehab  200  -SC     Post Treatment Diastolic BP  100  -SC     Row Name 11/15/19 1540          Positioning and Restraints    Pre-Treatment Position  in bed  -SC     Post Treatment Position  chair  -SC     In Chair  reclined;notified nsg;exit alarm on;sitting;call light within reach;encouraged to call for assist  -SC       User Key  (r) = Recorded By, (t) = Taken By, (c) = Cosigned By    Initials Name Provider Type    Tammie Godinez PT Physical Therapist        Outcome Measures     Row Name 11/15/19 1547          How much help from another person do you currently need...    Turning from your back to your side while in flat bed without using bedrails?  4  -SC     Moving from lying on back to sitting on the side of a flat bed without bedrails?  3  -SC     Moving to and from a bed to a chair (including a wheelchair)?  3  -SC     Standing up from a chair using your arms (e.g., wheelchair, bedside chair)?  3  -SC     Climbing 3-5 steps with a railing?  3  -SC     To walk in hospital room?  3  -SC     AM-PAC 6 Clicks Score (PT)  19  -[facility] Name 11/15/19 1541          Functional Assessment    Outcome Measure Options  AM-PAC 6 Clicks Basic Mobility (PT)  -SC       User Key  (r) = Recorded By, (t) = Taken By, (c) = Cosigned By    Initials Name Provider Type    Tammie Godinez PT Physical Therapist        Physical Therapy Education     Title: PT OT SLP Therapies (Done)     Topic: Physical Therapy (Done)     Point: Mobility training (Done)     Learning Progress Summary           Patient JOSÉ MIGUEL Monge VU by SC  at 11/15/2019  3:45 PM    Comment:  reviewed benefits of activity                   Point: Home exercise program (Done)     Learning Progress Summary           Patient JOSÉ MIGUEL Monge VU by SC at 11/15/2019  3:45 PM    Comment:  reviewed benefits of activity                   Point: Body mechanics (Done)     Learning Progress Summary           Patient JOSÉ MIGUEL Monge VU by SC at 11/15/2019  3:45 PM    Comment:  reviewed benefits of activity                   Point: Precautions (Done)     Learning Progress Summary           Patient JOSÉ MIGUEL Monge VU by SC at 11/15/2019  3:45 PM    Comment:  reviewed benefits of activity                               User Key     Initials Effective Dates Name Provider Type Discipline    SC 06/19/15 -  Tammie Langley PT Physical Therapist PT              PT Recommendation and Plan     Plan of Care Reviewed With: patient  Progress: improving  Outcome Summary: Patietn able to transfer out of bed and ambulate with minimal assist in hallway 120 feet. Recommend rolling walker to improve stabiltiy      Time Calculation:   PT Charges     Row Name 11/15/19 1140             Time Calculation    Start Time  1140  -SC      PT Received On  11/15/19  -SC      PT Goal Re-Cert Due Date  11/25/19  -SC        User Key  (r) = Recorded By, (t) = Taken By, (c) = Cosigned By    Initials Name Provider Type    SC Tammie Langley, PT Physical Therapist        Therapy Charges for Today     Code Description Service Date Service Provider Modifiers Qty    [id number] HC PT EVAL MOD COMPLEXITY 4 11/15/2019 Tammie Langley PT GP 1          PT G-Codes  Outcome Measure Options: AM-PAC 6 Clicks Basic Mobility (PT)  AM-PAC 6 Clicks Score (PT): 19  AM-PAC 6 Clicks Score (OT): 19    Tammie Langley, PT  11/15/2019

## 2019-11-15 NOTE — PROGRESS NOTES
"Infectious Disease Progress Note  Aura Fischer  1940  [id number]    Date of Consult: 11/13/2019  Admission Date: 11/13/2019    Requesting Provider: Enoch Davis,*  Evaluating Physician: Jose Kee MD    Chief Complaint: confusion    Reason for Consultation: fever, confusion    History of present illness:   Patient is a 79 y.o.  Yr old female with history of HTN, HLD, thyroid disease.  She was taken to the emergency department and [place] after being found down by her family, nearly unresponsive.  She was last seen well about 20 hours prior and at that time complained of sinusitis and sore throat.  She had not taken any antibiotics.  She had been near her sister who is sick with similar symptoms.  Nobody with known influenza.  No recent travel.  No recent procedures, injections etc.  She was worked up initially in the [place] emergency department and transferred to [facility] on 11/13 for further work-up.  Febrile on arrival here up to 101.  Mental status is improved but she remains significantly confused.  She is oriented to name but answers all other questions including location and date with her son's name.  Son and daughter-in-law are at bedside who provided the history. Influenza PCR negative. WBC 12. UA pending. Started on empiric antibiotics for meningitis. Per family mental status is better in the past 24 hrs.     11/14: Afebrile overnight.  Stable confusion.  She answers all questions with her name and is not oriented.  She complains of \"pain all over.\"    11/15: much improved in past 24 hrs. Afebrile. Much less confused. Now oriented x3 and generally appropriate in conversation. Still has some mental slowing and word finding difficulty. Complains of generalized pain but non-focal. Denies dysuria, flank pain, hematuria, frequency.        ROS:  As above and: no further fevers or chills. No n/v/d. No rash. No new ADR to Abx.         Allergies   Allergen Reactions   • Sulfa " Antibiotics        Antibiotics:  Anti-Infectives (From admission, onward)    Ordered     Dose/Rate Route Frequency Start Stop    11/14/19 1329  cefTRIAXone (ROCEPHIN) 2 g/100 mL 0.9% NS VTB (JOSLYN)     Ordering Provider:  Jose Kee MD    2 g  over 30 Minutes Intravenous Every 24 Hours Scheduled 11/14/19 1800 11/21/19 0859    11/13/19 0815  acyclovir (ZOVIRAX) 500 mg in sodium chloride 0.9 % 100 mL IVPB     Ordering Provider:  Bruno Ocampo MD    10 mg/kg × 50.1 kg (Ideal)  over 60 Minutes Intravenous Once 11/13/19 0900 11/13/19 1042    11/13/19 0612  vancomycin 1500 mg/500 mL 0.9% NS IVPB ([facility])     Ordering Provider:  Jose Lopez RPH    25 mg/kg × 57.4 kg  over 90 Minutes Intravenous Once 11/13/19 0700 11/13/19 0948    11/13/19 0315  cefepime (MAXIPIME) 2 g/100 mL 0.9% NS (mbp)     Ordering Provider:  Adelita Lua MD    2 g  200 mL/hr over 30 Minutes Intravenous Once 11/13/19 0415 11/13/19 0804          Other Medications:  Current Facility-Administered Medications   Medication Dose Route Frequency Provider Last Rate Last Dose   • ! Hold anticoagulants 24hr prior to lumbar puncture  1 each Does not apply Continuous PRN Jose Kee MD       • acetaminophen (TYLENOL) tablet 650 mg  650 mg Oral Q4H PRN Day, Adelita PINEDA MD   650 mg at 11/15/19 0831    Or   • acetaminophen (TYLENOL) suppository 650 mg  650 mg Rectal Q4H PRN Day, Adelita PINEDA MD   650 mg at 11/14/19 1228   • aluminum-magnesium hydroxide-simethicone (MAALOX MAX) 400-400-40 MG/5ML 15 mL, Lidocaine Viscous HCl (XYLOCAINE) 2 % 15 mL suspension   Oral Once Naheed Lowe II, DO       • aspirin chewable tablet 81 mg  81 mg Oral Daily Day, Adelita PINEDA MD   81 mg at 11/15/19 0831   • atorvastatin (LIPITOR) tablet 80 mg  80 mg Oral Nightly Day, Adelita PINEDA MD   80 mg at 11/14/19 2023   • cefTRIAXone (ROCEPHIN) 2 g/100 mL 0.9% NS VTB (JOSLYN)  2 g Intravenous Q24H Jose Kee MD   2 g at 11/15/19 0831   • dextrose (D50W) 25 g/ 50mL  Intravenous Solution 25 g  25 g Intravenous Q15 Min PRN Bruno Ocampo MD       • dextrose (GLUTOSE) oral gel 15 g  15 g Oral Q15 Min PRN Bruno Ocampo MD       • famotidine (PEPCID) tablet 20 mg  20 mg Oral BID AC Naheed Lowe II, DO       • glucagon (human recombinant) (GLUCAGEN DIAGNOSTIC) injection 1 mg  1 mg Subcutaneous Q15 Min PRN Bruno Ocampo MD       • hydrALAZINE (APRESOLINE) injection 10 mg  10 mg Intravenous Q6H PRN Naheed Lowe II, DO   10 mg at 11/14/19 2313   • insulin lispro (humaLOG) injection 0-7 Units  0-7 Units Subcutaneous 4x Daily With Meals & Nightly Bruno Ocampo MD   Stopped at 11/14/19 1800   • levothyroxine (SYNTHROID, LEVOTHROID) tablet 75 mcg  75 mcg Oral Daily Day, Adelita PINEDA MD   75 mcg at 11/15/19 0506   • lidocaine (XYLOCAINE) 1 % injection 5 mL  5 mL Subcutaneous Once Vidal Correa MD       • lisinopril (PRINIVIL,ZESTRIL) tablet 20 mg  20 mg Oral Q12H Naheed Lowe II, DO       • Magnesium Sulfate 2 gram Bolus, followed by 8 gram infusion (total Mg dose 10 grams)- Mg less than or equal to 1mg/dL  2 g Intravenous PRN Naheed Lowe II, DO        Or   • Magnesium Sulfate 2 gram / 50mL Infusion (GIVE X 3 BAGS TO EQUAL 6GM TOTAL DOSE) - Mg 1.1 - 1.5 mg/dl  2 g Intravenous PRN Naheed Lowe II, DO        Or   • Magnesium Sulfate 4 gram infusion- Mg 1.6-1.9 mg/dL  4 g Intravenous PRN Naheed Lowe II, DO       • ondansetron (ZOFRAN) injection 4 mg  4 mg Intravenous Q6H PRN Susanne Allen APRN   4 mg at 11/14/19 1419   • ondansetron (ZOFRAN) tablet 4 mg  4 mg Oral Once PRN Jose Kee MD       • pantoprazole (PROTONIX) EC tablet 40 mg  40 mg Oral Q AM Naheed Lowe II, DO       • potassium chloride (MICRO-K) CR capsule 40 mEq  40 mEq Oral PRN Mynor, Naheed M II, DO        Or   • potassium chloride (KLOR-CON) packet 40 mEq  40 mEq Oral PRN Mynor, Naheed M II, DO        Or   • potassium chloride 10 mEq in 100 mL IVPB  10 mEq Intravenous Q1H PRN  "Naheed Lowe II, DO       • promethazine (PHENERGAN) tablet 12.5 mg  12.5 mg Oral Q6H PRN MynorNaheed M II, DO        Or   • promethazine (PHENERGAN) injection 12.5 mg  12.5 mg Intramuscular Q6H PRN MynorNaheed M II, DO   12.5 mg at 11/14/19 1611    Or   • promethazine (PHENERGAN) suppository 12.5 mg  12.5 mg Rectal Q6H PRN MynorNaheed M II, DO       • QUEtiapine (SEROquel) tablet 25 mg  25 mg Oral Nightly PRN Zina Delcid MD       • sodium chloride 0.9 % flush 10 mL  10 mL Intravenous Q12H Day, Adelita PINEDA MD   10 mL at 11/15/19 0954   • sodium chloride 0.9 % flush 10 mL  10 mL Intravenous PRN Day, Adelita PINEDA MD       • sodium chloride 0.9 % infusion  100 mL/hr Intravenous Continuous Day, Adelita PINEDA  mL/hr at 11/13/19 0717 100 mL/hr at 11/13/19 0717       Physical Exam:   Vital Signs   /88   Pulse 112   Temp 97.9 °F (36.6 °C) (Axillary)   Resp 16   Ht 157.5 cm (62.01\")   Wt 57.4 kg (126 lb 8 oz)   SpO2 96%   BMI 23.13 kg/m²     GENERAL: much improved. Alert, interactive. Mild confusion  HEENT: Normocephalic, atraumatic.  PERRL. EOMI. No conjunctival injection. No icterus. No oral lesions. + dentures   NECK: Supple without nuchal rigidity or meningismus.   LYMPH: No cervical lymphadenopathy.  HEART: RRR; No murmur.  LUNGS: Clear to auscultation bilaterally without wheezing, rales, rhonchi. Normal respiratory effort. Nonlabored.  ABDOMEN: Soft, mild lower quadrant discomfort bilaterally but no guarding or rebound.  nondistended. Positive bowel sounds. No CVA tenderness   EXT:  No cyanosis, clubbing or edema.  : + purewick catheter.  MSK: FROM without joint effusions noted arms/legs.    SKIN: Warm and dry without cutaneous eruptions on Inspection/palpation.    NEURO: AOx3 No focal deficits on motor/sensory exam at arms/legs.  PSYCHIATRIC: much less confused    Laboratory Data    Results from last 7 days   Lab Units 11/15/19  0844 11/13/19  0524   WBC 10*3/mm3 13.21* 12.97* "   HEMOGLOBIN g/dL 12.9 12.4   HEMATOCRIT % 39.1 38.3   PLATELETS 10*3/mm3 337 293     Results from last 7 days   Lab Units 11/15/19  0844   SODIUM mmol/L 137   POTASSIUM mmol/L 2.9*   CHLORIDE mmol/L 101   CO2 mmol/L 20.0*   BUN mg/dL 12   CREATININE mg/dL 0.74   GLUCOSE mg/dL 178*   CALCIUM mg/dL 7.9*     Estimated Creatinine Clearance: 52.5 mL/min (by C-G formula based on SCr of 0.74 mg/dL).  Results from last 7 days   Lab Units 11/13/19  0524   ALK PHOS U/L 106   BILIRUBIN mg/dL 0.7   ALT (SGPT) U/L 31   AST (SGOT) U/L 29               Microbiology:    [facility] called 11/15  Blood cx negative, day 2      [facility]   11/13 blood cultures pending, NGTD  Influenza PCR negative   UA positive, culture negative   CSF culture with Gram stain negative, culture negative to date  Meningitis/encephalitis panel is negative    Radiology:  Ct Angiogram Neck    Result Date: 11/13/2019  1. Densely calcified plaque at the bilateral carotid bifurcations. By NASCET criteria there is 0% diameter stenosis on the right and approximately 30% diameter stenosis on the left. (Degree of estimated stenosis on the left is a change from the preliminary report; please note the degree of stenosis is difficult to accurately estimate given the density of calcified plaque.) 2. Both vertebral arteries are patent and codominant. 3. No intracranial vascular cutoff or focal central stenosis allowing for hypoplastic right A1 vessel. 4. 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 Signer Name: Avelina Freitas MD  Signed: 11/13/2019 8:55 AM  Workstation Name: [id number]  [facility]    Mri Brain Without Contrast    Result Date: 11/13/2019  1. Incomplete study limited to only diffusion-weighted imaging. 2. No gross evidence of acute ischemia or other restricted diffusion. 3. Recommend CT imaging if not already performed. Signer Name: Pascual Sandy MD  Signed: 11/13/2019 5:02 AM  Workstation Name: [id number]   [facility]    Xr Chest 1 View    Result Date: 11/13/2019  No active disease. Cardiomegaly. Signer Name: Pascual Sandy MD  Signed: 11/13/2019 4:11 AM  Workstation Name: [email or web address]  [facility]    Ir Lumbar Puncture Diag/thera    Result Date: 11/13/2019  Successful fluoroscopic guided lumbar puncture as above with no immediate complications.    This report was finalized on 11/13/2019 4:46 PM by Dr. Angus Correa MD.      Ct Angiogram Head    Result Date: 11/13/2019  1. Densely calcified plaque at the bilateral carotid bifurcations. By NASCET criteria there is 0% diameter stenosis on the right and approximately 30% diameter stenosis on the left. (Degree of estimated stenosis on the left is a change from the preliminary report; please note the degree of stenosis is difficult to accurately estimate given the density of calcified plaque.) 2. Both vertebral arteries are patent and codominant. 3. No intracranial vascular cutoff or focal central stenosis allowing for hypoplastic right A1 vessel. 4. 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 Signer Name: Avelina Freitas MD  Signed: 11/13/2019 8:55 AM  Workstation Name: [id number]  [facility]    Ct Cerebral Perfusion With & Without Contrast    Result Date: 11/13/2019  No focal area of acute ischemia identified. 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 Fever, sepsis, confusion: source still not entirely clear, although pyuria on UA makes severe UTI seem most likely although urine cx negative. She has a mild pleocytosis on CSF with neutrophilic predominance but normal protein and elevated glucose so not classic for bacterial meningitis. Biofire negative. Seems more likely to be reactive to  some other acute process. Possibly generalized non-specific viral illness? She did have a sick contact who had a URI. Influenza negative.  Blood cx negative so far. CXR clear.     2. Acute encephalopathy: much improved. Close to baseline? found down. Head imaging negative for CVA. EEG negative. Neurology evaluated, now signed off   3. Leukocytosis, stable  4. Lower abdominal discomfort: from UTI? If no improvement consider CT scan  5. MV abnormality: seen on TTE. Fibroelastoma? Will have to look more closely for endocarditis if blood cx turn positive   6. Fecal incontinence when found down: loose stools but no diarrhea per RN  7. 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  8. HTN  9. HLD  10. Hx of thyroid disease: TSH ok  11. Listed sulfa allergy: unable to reliably confirm  12. Prolonged QTc: relative contraindication to some antibiotics    PLAN:    -I called [facility] lab. Bloods cx negative to date  - f/u pending blood cultures from [facility]  - trend CBC w/diff and BMP  - renal US ordered to rule out obstruction     - stop vancomycin  - Continue IV ceftriaxone 2 gm daily while inpatient   - probiotic  - Discussed with CM: making plans for SNF at discharge  - when ready for discharge, stop ceftriaxone and switch to PO cefdinir 300 mg BID to complete 14 day course through 11/25    I will be off until 11/19. Please call on-call partners if any acute concerns in the meantime.     Jose Kee MD  11/15/2019

## 2019-11-15 NOTE — PLAN OF CARE
Problem: Patient Care Overview  Goal: Plan of Care Review  Outcome: Ongoing (interventions implemented as appropriate)   11/15/19 4791   Coping/Psychosocial   Plan of Care Reviewed With patient   OTHER   Outcome Summary Patient able to transfer out of bed and ambulate with minimal assist in hallway 120 feet. Recommend rolling walker to improve stability    Plan of Care Review   Progress improving

## 2019-11-15 NOTE — PROGRESS NOTES
Continued Stay Note  [facility]     Patient Name: Aura Fischer  MRN: [id number]  Today's Date: 11/15/2019    Admit Date: 11/13/2019    Discharge Plan     Row Name 11/15/19 0804       Plan    Plan  [facility]    Patient/Family in Agreement with Plan  yes    Plan Comments  Spoke to patient at bedside. Patient is alert today. CM asked if patient would like to go to [facility] and [facility] ands she was agreeable. Faxed referral to [facility]. CM will continue to follow.    Final Discharge Disposition Code  03 - skilled nursing facility (SNF)        Discharge Codes    No documentation.             Pascual Lucio RN

## 2019-11-16 LAB
BACTERIA SPEC AEROBE CULT: NORMAL
GLUCOSE BLDC GLUCOMTR-MCNC: 113 MG/DL (ref 70–130)
GLUCOSE BLDC GLUCOMTR-MCNC: 121 MG/DL (ref 70–130)
GLUCOSE BLDC GLUCOMTR-MCNC: 123 MG/DL (ref 70–130)
GLUCOSE BLDC GLUCOMTR-MCNC: 168 MG/DL (ref 70–130)
GRAM STN SPEC: NORMAL
GRAM STN SPEC: NORMAL
POTASSIUM BLD-SCNC: 4.2 MMOL/L (ref 3.5–5.2)

## 2019-11-16 PROCEDURE — 99232 SBSQ HOSP IP/OBS MODERATE 35: CPT | Performed by: INTERNAL MEDICINE

## 2019-11-16 PROCEDURE — 25010000002 CEFTRIAXONE PER 250 MG: Performed by: INTERNAL MEDICINE

## 2019-11-16 PROCEDURE — 82962 GLUCOSE BLOOD TEST: CPT

## 2019-11-16 PROCEDURE — 25010000002 HYDRALAZINE PER 20 MG: Performed by: INTERNAL MEDICINE

## 2019-11-16 PROCEDURE — 97116 GAIT TRAINING THERAPY: CPT

## 2019-11-16 RX ORDER — AMLODIPINE BESYLATE 10 MG/1
10 TABLET ORAL
Status: DISCONTINUED | OUTPATIENT
Start: 2019-11-16 | End: 2019-11-19 | Stop reason: HOSPADM

## 2019-11-16 RX ORDER — AMLODIPINE BESYLATE 10 MG/1
10 TABLET ORAL
Status: DISCONTINUED | OUTPATIENT
Start: 2019-11-17 | End: 2019-11-16

## 2019-11-16 RX ORDER — AMLODIPINE BESYLATE 5 MG/1
5 TABLET ORAL
Status: DISCONTINUED | OUTPATIENT
Start: 2019-11-16 | End: 2019-11-16

## 2019-11-16 RX ADMIN — CEFTRIAXONE 2 G: 2 INJECTION, POWDER, FOR SOLUTION INTRAMUSCULAR; INTRAVENOUS at 08:18

## 2019-11-16 RX ADMIN — LEVOTHYROXINE SODIUM 75 MCG: 75 TABLET ORAL at 04:28

## 2019-11-16 RX ADMIN — AMLODIPINE BESYLATE 10 MG: 10 TABLET ORAL at 13:50

## 2019-11-16 RX ADMIN — LISINOPRIL 20 MG: 20 TABLET ORAL at 08:18

## 2019-11-16 RX ADMIN — FAMOTIDINE 20 MG: 20 TABLET ORAL at 08:19

## 2019-11-16 RX ADMIN — ATORVASTATIN CALCIUM 80 MG: 40 TABLET, FILM COATED ORAL at 20:00

## 2019-11-16 RX ADMIN — ACETAMINOPHEN 650 MG: 325 TABLET ORAL at 08:18

## 2019-11-16 RX ADMIN — FAMOTIDINE 20 MG: 20 TABLET ORAL at 17:59

## 2019-11-16 RX ADMIN — HYDRALAZINE HYDROCHLORIDE 10 MG: 20 INJECTION INTRAMUSCULAR; INTRAVENOUS at 10:55

## 2019-11-16 RX ADMIN — INSULIN LISPRO 2 UNITS: 100 INJECTION, SOLUTION INTRAVENOUS; SUBCUTANEOUS at 20:37

## 2019-11-16 RX ADMIN — Medication 250 MG: at 08:18

## 2019-11-16 RX ADMIN — ASPIRIN 81 MG 81 MG: 81 TABLET ORAL at 08:18

## 2019-11-16 RX ADMIN — HYDRALAZINE HYDROCHLORIDE 10 MG: 20 INJECTION INTRAMUSCULAR; INTRAVENOUS at 04:26

## 2019-11-16 RX ADMIN — FLUOXETINE 10 MG: 10 CAPSULE ORAL at 08:18

## 2019-11-16 RX ADMIN — PANTOPRAZOLE SODIUM 40 MG: 40 TABLET, DELAYED RELEASE ORAL at 07:00

## 2019-11-16 RX ADMIN — LISINOPRIL 20 MG: 20 TABLET ORAL at 20:00

## 2019-11-16 RX ADMIN — SODIUM CHLORIDE, PRESERVATIVE FREE 10 ML: 5 INJECTION INTRAVENOUS at 08:19

## 2019-11-16 NOTE — PROGRESS NOTES
[facility] Medicine Services  PROGRESS NOTE    Patient Name: Aura Fischer  : 1940  MRN: [id number]    Date of Admission: 2019  Primary Care Physician: Jose Manuel Cbarales MD    Subjective   Subjective     CC: f/u HTN    HPI: Up in bed resting. Overall feeling better. Esophageal pain improving. Eating a little more.    Review of Systems  Gen- No fevers, chills  CV- No chest pain, palpitations  Resp- No cough, dyspnea  GI- No N/V/D, abd pain    Objective   Objective     Vital Signs:   Temp:  [98.3 °F (36.8 °C)-99.4 °F (37.4 °C)] 98.7 °F (37.1 °C)  Heart Rate:  [] 98  Resp:  [16-18] 16  BP: (159-204)/() 191/83  Total (NIH Stroke Scale): 5     Physical Exam:  Constitutional: No acute distress, awake, alert  HENT: NCAT, mucous membranes moist  Respiratory: Clear to auscultation bilaterally, respiratory effort normal   Cardiovascular: RRR, no murmurs, rubs, or gallops, palpable pedal pulses bilaterally  Gastrointestinal: Positive bowel sounds, soft, nontender, nondistended  Musculoskeletal: No bilateral ankle edema  Psychiatric: Appropriate affect, cooperative  Neurologic: Oriented x 3, strength symmetric in all extremities, Cranial Nerves grossly intact to confrontation, speech clear  Skin: No rashes    Results Reviewed:    Results from last 7 days   Lab Units 11/15/19  0844 19  0524   WBC 10*3/mm3 13.21* 12.97*   HEMOGLOBIN g/dL 12.9 12.4   HEMATOCRIT % 39.1 38.3   PLATELETS 10*3/mm3 337 293     Results from last 7 days   Lab Units 11/15/19  2350 11/15/19  0844 19  0524   SODIUM mmol/L  --  137 137   POTASSIUM mmol/L 4.2 2.9* 3.7   CHLORIDE mmol/L  --  101 99   CO2 mmol/L  --  20.0* 22.0   BUN mg/dL  --  12 15   CREATININE mg/dL  --  0.74 0.79   GLUCOSE mg/dL  --  178* 184*   CALCIUM mg/dL  --  7.9* 8.7   ALT (SGPT) U/L  --   --  31   AST (SGOT) U/L  --   --  29   TROPONIN T ng/mL  --   --  <0.010     Estimated Creatinine Clearance: 52.5 mL/min (by C-G  formula based on SCr of 0.74 mg/dL).    Microbiology Results Abnormal     Procedure Component Value - Date/Time    Blood Culture - Blood, Hand, Right [[id number]] Collected:  11/13/19 0533    Lab Status:  Preliminary result Specimen:  Blood from Hand, Right Updated:  11/16/19 0801     Blood Culture No growth at 3 days    Blood Culture - Blood, Arm, Right [[id number]] Collected:  11/13/19 0542    Lab Status:  Preliminary result Specimen:  Blood from Arm, Right Updated:  11/16/19 0801     Blood Culture No growth at 3 days    Culture, CSF - Cerebrospinal Fluid, Lumbar Puncture [[id number]] Collected:  11/13/19 1420    Lab Status:  Final result Specimen:  Cerebrospinal Fluid from Lumbar Puncture Updated:  11/16/19 0712     CSF Culture No growth at 3 days     Gram Stain Few (2+) WBCs seen      No organisms seen    Urine Culture - Urine, Urine, Clean Catch [[id number]]  (Normal) Collected:  11/13/19 0810    Lab Status:  Final result Specimen:  Urine, Clean Catch Updated:  11/14/19 2142     Urine Culture No growth    Meningitis / Encephalitis Panel, PCR - Cerebrospinal Fluid, Lumbar Puncture [[id number]]  (Normal) Collected:  11/13/19 1420    Lab Status:  Final result Specimen:  Cerebrospinal Fluid from Lumbar Puncture Updated:  11/13/19 1628     ESCHERICHIA COLI K1, PCR Not Detected     HAEMOPHILUS INFLUENZAE, PCR Not Detected     LISTERIA MONOCYTOGENES, PCR Not Detected     NEISSERIA MENINGITIDIS, PCR Not Detected     STREPTOCOCCUS AGALACTIAE, PCR Not Detected     STREPTOCOCCUS PNEUMONIAE, PCR Not Detected     CYTOMEGALOVIRUS (CMV), PCR Not Detected     ENTEROVIRUS, PCR Not Detected     HERPES SIMPLEX VIRUS 1 (HSV-1), PCR Not Detected     HERPES SIMPLEX VIRUS 2 (HSV-2), PCR Not Detected     HUMAN PARECHOVIRUS, PCR Not Detected     VARICELLA ZOSTER VIRUS (VZV), PCR Not Detected     CRYPTOCOCCUS NEOFORMANS / GATTII, PCR Not Detected     HUMAN HERPES VIRUS 6 PCR Not Detected    Influenza A & B PCR - Swab, Nasopharynx [[id number]]   (Normal) Collected:  11/13/19 0811    Lab Status:  Final result Specimen:  Swab from Nasopharynx Updated:  11/13/19 1023     Influenza A PCR Not Detected     Influenza B PCR Not Detected          Imaging Results (Last 24 Hours)     Procedure Component Value Units Date/Time    US Renal Limited [[id number]] Collected:  11/16/19 0827     Updated:  11/16/19 0828    Narrative:       EXAMINATION: US RENAL LIMITED-     INDICATION: severe UTI. screen for obstruction; G93.41-Metabolic  encephalopathy; R41.841-Cognitive communication deficit; Z74.09-Other  reduced mobility urinary tract infection     TECHNIQUE: Sonographic imaging is obtained of the kidneys about the  sagittal and transverse planes.     COMPARISON: NONE     FINDINGS: Right kidney measures in length from portable 10.4 cm. There  is no solid cortical mass or renal cortical cysts seen within the right  kidney. No hydronephrosis or nephrolithiasis.     The left kidney measures in length from portable 10.1 cm. No solid  cortical mass or renal cortical cysts seen within left kidney. No  hydronephrosis or nephrolithiasis. Bladder is grossly unremarkable in  appearance. No underlying mass or lesion.     Incidental cystic structure seen in the right upper quadrant previously  noted to be within the liver. The approximate measurement is 10.2 cm.          Impression:       Unremarkable bilateral renal ultrasound.                 Results for orders placed during the hospital encounter of 11/13/19   Adult Transthoracic Echo Complete W/ Cont if Necessary Per Protocol (With Agitated Saline)    Narrative · Normal cardiac chamber sizes and wall thicknesses.  · Global and segmental LV wall motion is normal with an estimated LV   ejection fraction >70%.  · There is mild aortic valve sclerosis. I cannot exclude mild aortic   insufficiency.  · There is mitral annular calcification and mild mitral regurgitation.  · The estimated pulmonary artery pressure is normal.  · There is a  small (0.5 cm in its longest dimension) oval structure on the   anterior MV leaflet. This has the appearance of a fibroelastoma although I   cannot exclude myxoma, organized thrombus,or leaflet calcification from   this study.  · Agitated saline study is negative for intracardiac shunt.          I have reviewed the medications:  Scheduled Meds:  amLODIPine 5 mg Oral Q24H   aspirin 81 mg Oral Daily   atorvastatin 80 mg Oral Nightly   cefTRIAXone 2 g Intravenous Q24H   famotidine 20 mg Oral BID AC   FLUoxetine 10 mg Oral Daily   insulin lispro 0-7 Units Subcutaneous 4x Daily With Meals & Nightly   levothyroxine 75 mcg Oral Daily   lidocaine 5 mL Subcutaneous Once   lisinopril 20 mg Oral Q12H   pantoprazole 40 mg Oral Q AM   saccharomyces boulardii 250 mg Oral Daily   sodium chloride 10 mL Intravenous Q12H     Continuous Infusions:  hold 1 each    sodium chloride 100 mL/hr Last Rate: 100 mL/hr (11/13/19 0717)     PRN Meds:.•  hold  •  acetaminophen **OR** acetaminophen  •  dextrose  •  dextrose  •  glucagon (human recombinant)  •  hydrALAZINE  •  magnesium sulfate **OR** magnesium sulfate **OR** magnesium sulfate  •  ondansetron  •  ondansetron  •  potassium chloride **OR** potassium chloride **OR** potassium chloride  •  promethazine **OR** promethazine **OR** promethazine  •  QUEtiapine  •  sodium chloride      Assessment/Plan   Assessment / Plan     Active Hospital Problems    Diagnosis  POA   • Acute metabolic encephalopathy [G93.41]  Yes   • Acute UTI (urinary tract infection) [N39.0]  Yes   • Sepsis, unspecified organism (CMS/HCC) [A41.9]  Yes   • Lactic acidosis [E87.2]  Yes   • Mixed hyperlipidemia [E78.2]  Yes   • Essential hypertension [I10]  Yes   • Acquired hypothyroidism [E03.9]  Yes      Resolved Hospital Problems   No resolved problems to display.        Brief Hospital Course to date:  Aura Fischer is a 79 y.o. female who was found down nearly 20 hours after last known well. She is found to have  metabolic encephalopathy likely due to viral illness.      A/P:  --Her encephalopathy has resolved and was most likely due to acute viral illness and UTI. Continue IV rocephin for now with plan for PO cephalosporin per ID note.  --Neurology has s/o. I have d/w her. Felt to be due to above, and now that resolved has s/o.   --Her CT perfusion, CTA head and neck were largely unremarkable. May need repeat MRI but unable to cooperate at this time. Initial MRI without ischemia.  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 Discussed briefly with cardiology, agree that MARGARITO is next reasonable step. Will need to be NPO Sunday MN for MARGARITO Monday.  --Reviewed BP for past 24 hours. Her HTN remains uncontrolled. On ace inhibitor BID at max. Added amlodipine.  --Esophageal burning improving. Continue GI cocktail prn along with pepcid, PPI for discomfort.  --CM following. Planning for rehab at d/c.     DVT Prophylaxis:  Mechanical.     Disposition: I expect the patient to be discharged to rehab in 1-3 days.       CODE STATUS:   Code Status and Medical Interventions:   Ordered at: 11/13/19 0803     Level Of Support Discussed With:    Next of Kin (If No Surrogate)     Code Status:    No CPR     Medical Interventions (Level of Support Prior to Arrest):    Full         Electronically signed by Naheed Lowe II, DO, 11/16/19, 11:57 AM.

## 2019-11-16 NOTE — PLAN OF CARE
Problem: Patient Care Overview  Goal: Plan of Care Review  Outcome: Ongoing (interventions implemented as appropriate)   11/16/19 1524   Coping/Psychosocial   Plan of Care Reviewed With patient;family   OTHER   Outcome Summary Pt alert and oriented today; IV fluids still running at 100 ml/hr; IV abx given; esophagus still painful-small sips and non acidic foods/drinks encouraged; turned q2; lbm today; bp still elevated-dr. salinas notified; 10 mg amlodipine given plus hydralazine IV; pt sleepy today. voiding decreased today and no episodes of incontinence.    Plan of Care Review   Progress improving     Goal: Individualization and Mutuality  Outcome: Ongoing (interventions implemented as appropriate)    Goal: Discharge Needs Assessment  Outcome: Ongoing (interventions implemented as appropriate)    Goal: Interprofessional Rounds/Family Conf  Outcome: Ongoing (interventions implemented as appropriate)      Problem: Fall Risk (Adult)  Goal: Identify Related Risk Factors and Signs and Symptoms  Outcome: Ongoing (interventions implemented as appropriate)    Goal: Absence of Fall  Outcome: Ongoing (interventions implemented as appropriate)      Problem: Skin Injury Risk (Adult)  Goal: Identify Related Risk Factors and Signs and Symptoms  Outcome: Ongoing (interventions implemented as appropriate)    Goal: Skin Health and Integrity  Outcome: Ongoing (interventions implemented as appropriate)      Problem: Confusion, Acute (Adult)  Goal: Identify Related Risk Factors and Signs and Symptoms  Outcome: Ongoing (interventions implemented as appropriate)    Goal: Cognitive/Functional Impairments Minimized  Outcome: Ongoing (interventions implemented as appropriate)    Goal: Safety  Outcome: Ongoing (interventions implemented as appropriate)

## 2019-11-16 NOTE — PLAN OF CARE
Problem: Patient Care Overview  Goal: Plan of Care Review  Outcome: Ongoing (interventions implemented as appropriate)   11/16/19 4284   Coping/Psychosocial   Plan of Care Reviewed With patient   OTHER   Outcome Summary Pt able to increase ambulation distance to 200ft with RW with CGA. Pt performed t/f's with supervision, no LOB throughout session. Continue POC.   Plan of Care Review   Progress improving

## 2019-11-16 NOTE — THERAPY TREATMENT NOTE
Patient Name: Aura Fischer  : 1940    MRN: [id number]                              Today's Date: 2019       Admit Date: 2019    Visit Dx:     ICD-10-CM ICD-9-CM   1. Acute metabolic encephalopathy G93.41 348.31   2. Cognitive communication deficit R41.841 799.52   3. Impaired mobility and ADLs Z74.09 799.89     Patient Active Problem List   Diagnosis   • Rectal bleeding   • Healthcare maintenance   • Anemia   • Anxiety disorder   • Gastroesophageal reflux disease without esophagitis   • Mixed hyperlipidemia   • Essential hypertension   • Acquired hypothyroidism   • Recurrent pancreatitis   • Depression   • Osteoporosis   • Acute metabolic encephalopathy   • Acute UTI (urinary tract infection)   • Sepsis, unspecified organism (CMS/HCC)   • Lactic acidosis     Past Medical History:   Diagnosis Date   • Anemia    • Diverticulosis    • Hyperlipidemia    • Hypertension    • Internal hemorrhoid    • Mood disorder (CMS/HCC)    • Thyroid disease      Past Surgical History:   Procedure Laterality Date   • BREAST SURGERY     • CHOLECYSTECTOMY     • HEMORRHOIDECTOMY     • HYSTERECTOMY     • PANCREATECTOMY     • SPLENECTOMY       General Information     Row Name 19 1529          PT Evaluation Time/Intention    Document Type  therapy note (daily note)  -     Mode of Treatment  individual therapy  -     Row Name 19 1529          General Information    Existing Precautions/Restrictions  fall  -     Row Name 19 1529          Cognitive Assessment/Intervention- PT/OT    Orientation Status (Cognition)  oriented x 4  -     Cognitive Assessment/Intervention Comment  alert, following commands. Circle, so commands often need to be repeated.  -     Row Name 19 1529          Safety Issues, Functional Mobility    Safety Issues Affecting Function (Mobility)  ability to follow commands;insight into deficits/self awareness;sequencing abilities  -     Impairments Affecting Function  (Mobility)  endurance/activity tolerance;balance  -SJ       User Key  (r) = Recorded By, (t) = Taken By, (c) = Cosigned By    Initials Name Provider Type    Zina Marie PT Physical Therapist        Mobility     Row Name 11/16/19 1552          Bed Mobility Assessment/Treatment    Bed Mobility Assessment/Treatment  supine-sit  -SJ     Supine-Sit Conway (Bed Mobility)  verbal cues;supervision  -SJ     Comment (Bed Mobility)  good safety awareness  -SJ     Row Name 11/16/19 1552          Transfer Assessment/Treatment    Comment (Transfers)  pt performed bathroom toilet t/f with supervision  -SJ     Row Name 11/16/19 1552          Sit-Stand Transfer    Sit-Stand [place] (Transfers)  stand by assist  -SJ     Assistive Device (Sit-Stand Transfers)  walker, front-wheeled  -SJ     Row Name 11/16/19 1552          Gait/Stairs Assessment/Training    Conway Level (Gait)  contact guard  -SJ     Assistive Device (Gait)  walker, front-wheeled  -SJ     Distance in Feet (Gait)  200  -SJ     Pattern (Gait)  step-through  -SJ     Deviations/Abnormal Patterns (Gait)  base of support, narrow;gait speed decreased  -SJ     Bilateral Gait Deviations  forward flexed posture;heel strike decreased  -SJ     Comment (Gait/Stairs)  no LOB, improved balance with use of RW  -SJ       User Key  (r) = Recorded By, (t) = Taken By, (c) = Cosigned By    Initials Name Provider Type    Zina Marie PT Physical Therapist        Obj/Interventions     Row Name 11/16/19 1556          Dynamic Sitting Balance    Level of Conway, Reaches Outside Midline (Sitting, Dynamic Balance)  supervision  -SJ     Sitting Position, Reaches Outside Midline (Sitting, Dynamic Balance)  sitting on edge of bed toilet  -SJ     Row Name 11/16/19 1556          Static Standing Balance    Level of [place] (Supported Standing, Static Balance)  supervision  -SJ       User Key  (r) = Recorded By, (t) = Taken By, (c) = Cosigned By    Initials  Name Provider Type    Zina Marie PT Physical Therapist        Goals/Plan    No documentation.       Clinical Impression     Row Name 11/16/19 1556          Pain Assessment    Additional Documentation  Pain Scale: Numbers Pre/Post-Treatment (Group)  -     Row Name 11/16/19 1556          Pain Scale: Numbers Pre/Post-Treatment    Pain Scale: Numbers, Pretreatment  0/10 - no pain  -     Pain Scale: Numbers, Post-Treatment  0/10 - no pain  -     Row Name 11/16/19 1556          Vital Signs    Pre Systolic BP Rehab  178  -     Pre Treatment Diastolic BP  87  -     Pretreatment Heart Rate (beats/min)  109  -     Pre SpO2 (%)  94  -     O2 Delivery Pre Treatment  room air  -     Row Name 11/16/19 1556          Positioning and Restraints    Pre-Treatment Position  in bed  -     Post Treatment Position  chair  -     In Chair  reclined;call light within reach;encouraged to call for assist;exit alarm on;waffle cushion;heels elevated  -       User Key  (r) = Recorded By, (t) = Taken By, (c) = Cosigned By    Initials Name Provider Type    Zina Marie PT Physical Therapist        Outcome Measures    No documentation.       Physical Therapy Education     Title: PT OT SLP Therapies (In Progress)     Topic: Physical Therapy (In Progress)     Point: Mobility training (In Progress)     Learning Progress Summary           Patient Acceptance, E, NR by  at 11/16/2019  3:59 PM    JOSÉ MIGUEL Monge VU by SC at 11/15/2019  3:45 PM    Comment:  reviewed benefits of activity                   Point: Home exercise program (In Progress)     Learning Progress Summary           Patient Acceptance, E, NR by  at 11/16/2019  3:59 PM    JOSÉ MIGUEL Monge VU by SC at 11/15/2019  3:45 PM    Comment:  reviewed benefits of activity                   Point: Body mechanics (In Progress)     Learning Progress Summary           Patient Acceptance, E, NR by  at 11/16/2019  3:59 PM    JOSÉ MIGUEL Monge VU by SC at 11/15/2019  3:45 PM     Comment:  reviewed benefits of activity                   Point: Precautions (In Progress)     Learning Progress Summary           Patient Acceptance, E, NR by  at 11/16/2019  3:59 PM    JOSÉ MIGUEL Monge, VU by SC at 11/15/2019  3:45 PM    Comment:  reviewed benefits of activity                               User Key     Initials Effective Dates Name Provider Type Discipline    SC 06/19/15 -  Tammie Langley PT Physical Therapist PT     06/19/15 -  Zina Brito PT Physical Therapist PT              PT Recommendation and Plan     Plan of Care Reviewed With: patient  Progress: improving  Outcome Summary: Pt able to increase ambulation distance to 200ft with RW with CGA. Pt performed t/f's with supervision, no LOB throughout session. Continue POC.     Time Calculation:   PT Charges     Row Name 11/16/19 1529             Time Calculation    Start Time  1529  -      PT Received On  11/16/19  -      PT Goal Re-Cert Due Date  11/25/19  -         Time Calculation- PT    Total Timed Code Minutes- PT  23 minute(s)  -         Timed Charges    81029 - Gait Training Minutes   23  -        User Key  (r) = Recorded By, (t) = Taken By, (c) = Cosigned By    Initials Name Provider Type     Zina Brito, PT Physical Therapist        Therapy Charges for Today     Code Description Service Date Service Provider Modifiers Qty    03602349204 HC GAIT TRAINING EA 15 MIN 11/16/2019 Zina Brito PT GP 2          PT G-Codes  Outcome Measure Options: AM-PAC 6 Clicks Basic Mobility (PT)  AM-PAC 6 Clicks Score (PT): 21  AM-PAC 6 Clicks Score (OT): 19    Zina Brito PT  11/16/2019

## 2019-11-16 NOTE — PLAN OF CARE
Problem: Patient Care Overview  Goal: Plan of Care Review   11/15/19 6170   Coping/Psychosocial   Plan of Care Reviewed With patient   OTHER   Outcome Summary A&Ox3, mood/affect appropriate. Speech clear/logical. PT is much more alert tonight. No sx/si of confusion noted. Ambulates to the bathroom with one person assist, voids without difficulty. SBP remains elevated. IV hydralazine administered at 2200. PT is currently resting in bed w/o sx/si of pain/acute distress. BP closely monitored. Frequent T&R by nursing staff in place.   Plan of Care Review   Progress improving

## 2019-11-17 LAB
GLUCOSE BLDC GLUCOMTR-MCNC: 100 MG/DL (ref 70–130)
GLUCOSE BLDC GLUCOMTR-MCNC: 124 MG/DL (ref 70–130)
GLUCOSE BLDC GLUCOMTR-MCNC: 132 MG/DL (ref 70–130)
GLUCOSE BLDC GLUCOMTR-MCNC: 133 MG/DL (ref 70–130)

## 2019-11-17 PROCEDURE — 99232 SBSQ HOSP IP/OBS MODERATE 35: CPT | Performed by: INTERNAL MEDICINE

## 2019-11-17 PROCEDURE — 25010000002 CEFTRIAXONE PER 250 MG: Performed by: INTERNAL MEDICINE

## 2019-11-17 PROCEDURE — 25010000002 HYDRALAZINE PER 20 MG: Performed by: INTERNAL MEDICINE

## 2019-11-17 PROCEDURE — 82962 GLUCOSE BLOOD TEST: CPT

## 2019-11-17 RX ORDER — CARVEDILOL 6.25 MG/1
6.25 TABLET ORAL 2 TIMES DAILY WITH MEALS
Status: DISCONTINUED | OUTPATIENT
Start: 2019-11-17 | End: 2019-11-18

## 2019-11-17 RX ADMIN — ATORVASTATIN CALCIUM 80 MG: 40 TABLET, FILM COATED ORAL at 20:41

## 2019-11-17 RX ADMIN — LISINOPRIL 20 MG: 20 TABLET ORAL at 09:38

## 2019-11-17 RX ADMIN — HYDRALAZINE HYDROCHLORIDE 10 MG: 20 INJECTION INTRAMUSCULAR; INTRAVENOUS at 07:29

## 2019-11-17 RX ADMIN — CARVEDILOL 6.25 MG: 6.25 TABLET, FILM COATED ORAL at 17:46

## 2019-11-17 RX ADMIN — SODIUM CHLORIDE, PRESERVATIVE FREE 10 ML: 5 INJECTION INTRAVENOUS at 20:41

## 2019-11-17 RX ADMIN — AMLODIPINE BESYLATE 10 MG: 10 TABLET ORAL at 09:39

## 2019-11-17 RX ADMIN — FAMOTIDINE 20 MG: 20 TABLET ORAL at 09:38

## 2019-11-17 RX ADMIN — PANTOPRAZOLE SODIUM 40 MG: 40 TABLET, DELAYED RELEASE ORAL at 05:31

## 2019-11-17 RX ADMIN — FAMOTIDINE 20 MG: 20 TABLET ORAL at 17:46

## 2019-11-17 RX ADMIN — CEFTRIAXONE 2 G: 2 INJECTION, POWDER, FOR SOLUTION INTRAMUSCULAR; INTRAVENOUS at 09:40

## 2019-11-17 RX ADMIN — LISINOPRIL 20 MG: 20 TABLET ORAL at 20:41

## 2019-11-17 RX ADMIN — LEVOTHYROXINE SODIUM 75 MCG: 75 TABLET ORAL at 05:31

## 2019-11-17 RX ADMIN — ASPIRIN 81 MG 81 MG: 81 TABLET ORAL at 09:38

## 2019-11-17 RX ADMIN — Medication 250 MG: at 09:38

## 2019-11-17 RX ADMIN — CARVEDILOL 6.25 MG: 6.25 TABLET, FILM COATED ORAL at 09:38

## 2019-11-17 RX ADMIN — ACETAMINOPHEN 650 MG: 325 TABLET ORAL at 20:41

## 2019-11-17 RX ADMIN — FLUOXETINE 10 MG: 10 CAPSULE ORAL at 09:38

## 2019-11-17 NOTE — PLAN OF CARE
Problem: Patient Care Overview  Goal: Plan of Care Review  Outcome: Ongoing (interventions implemented as appropriate)   11/17/19 2517   Coping/Psychosocial   Plan of Care Reviewed With patient   OTHER   Outcome Summary Patient alert and oriented. Blood pressure elevated at beginning of shift, PRN hydralazine administered followed by scheduled medications. Blood pressure went down to with normal parameters. Up to vooid and ambulate in the flores with standby assist.    Plan of Care Review   Progress improving

## 2019-11-17 NOTE — PLAN OF CARE
Problem: Patient Care Overview  Goal: Plan of Care Review   11/16/19 4700   Coping/Psychosocial   Plan of Care Reviewed With patient   OTHER   Outcome Summary A&Ox3, mood/affect appropriate/calm. Speech clear/logical. Slightly tachycardiac/hypertensive. PT ambulates to the bathroom with one person assist. Voids spontaneously without difficulty. Will cont to mx. Call light in reach.   Plan of Care Review   Progress improving

## 2019-11-17 NOTE — PROGRESS NOTES
[facility] Medicine Services  PROGRESS NOTE    Patient Name: Aura Fischer  : 1940  MRN: [id number]    Date of Admission: 2019  Primary Care Physician: Jose Manuel Cabrales MD    Subjective   Subjective     CC: f/u AMS    HPI: Up in bed resting comfortably. Continues to improve. Throat pain better.    Review of Systems  Gen- No fevers, chills  CV- No chest pain, palpitations  Resp- No cough, dyspnea  GI- No N/V/D, abd pain    Objective   Objective     Vital Signs:   Temp:  [98.1 °F (36.7 °C)-98.6 °F (37 °C)] 98.2 °F (36.8 °C)  Heart Rate:  [] 91  Resp:  [16-18] 16  BP: (159-186)/() 172/74  Total (NIH Stroke Scale): 5     Physical Exam:  Constitutional: No acute distress, awake, alert  HENT: NCAT, mucous membranes moist  Respiratory: Clear to auscultation bilaterally, respiratory effort normal   Cardiovascular: RRR, no murmurs, rubs, or gallops, palpable pedal pulses bilaterally  Gastrointestinal: Positive bowel sounds, soft, nontender, nondistended  Musculoskeletal: No bilateral ankle edema  Psychiatric: Appropriate affect, cooperative  Neurologic: Oriented x 3, strength symmetric in all extremities, Cranial Nerves grossly intact to confrontation, speech clear  Skin: No rashes    Results Reviewed:    Results from last 7 days   Lab Units 11/15/19  0844 19  0524   WBC 10*3/mm3 13.21* 12.97*   HEMOGLOBIN g/dL 12.9 12.4   HEMATOCRIT % 39.1 38.3   PLATELETS 10*3/mm3 337 293     Results from last 7 days   Lab Units 11/15/19  2350 11/15/19  0844 19  0524   SODIUM mmol/L  --  137 137   POTASSIUM mmol/L 4.2 2.9* 3.7   CHLORIDE mmol/L  --  101 99   CO2 mmol/L  --  20.0* 22.0   BUN mg/dL  --  12 15   CREATININE mg/dL  --  0.74 0.79   GLUCOSE mg/dL  --  178* 184*   CALCIUM mg/dL  --  7.9* 8.7   ALT (SGPT) U/L  --   --  31   AST (SGOT) U/L  --   --  29   TROPONIN T ng/mL  --   --  <0.010     Estimated Creatinine Clearance: 52.5 mL/min (by C-G formula based on SCr  of 0.74 mg/dL).    Microbiology Results Abnormal     Procedure Component Value - Date/Time    Blood Culture - Blood, Hand, Right [[id number]] Collected:  11/13/19 0533    Lab Status:  Preliminary result Specimen:  Blood from Hand, Right Updated:  11/17/19 0801     Blood Culture No growth at 4 days    Blood Culture - Blood, Arm, Right [[id number]] Collected:  11/13/19 0542    Lab Status:  Preliminary result Specimen:  Blood from Arm, Right Updated:  11/17/19 0801     Blood Culture No growth at 4 days    Culture, CSF - Cerebrospinal Fluid, Lumbar Puncture [[id number]] Collected:  11/13/19 1420    Lab Status:  Final result Specimen:  Cerebrospinal Fluid from Lumbar Puncture Updated:  11/16/19 0712     CSF Culture No growth at 3 days     Gram Stain Few (2+) WBCs seen      No organisms seen    Urine Culture - Urine, Urine, Clean Catch [[id number]]  (Normal) Collected:  11/13/19 0810    Lab Status:  Final result Specimen:  Urine, Clean Catch Updated:  11/14/19 2142     Urine Culture No growth    Meningitis / Encephalitis Panel, PCR - Cerebrospinal Fluid, Lumbar Puncture [[id number]]  (Normal) Collected:  11/13/19 1420    Lab Status:  Final result Specimen:  Cerebrospinal Fluid from Lumbar Puncture Updated:  11/13/19 1628     ESCHERICHIA COLI K1, PCR Not Detected     HAEMOPHILUS INFLUENZAE, PCR Not Detected     LISTERIA MONOCYTOGENES, PCR Not Detected     NEISSERIA MENINGITIDIS, PCR Not Detected     STREPTOCOCCUS AGALACTIAE, PCR Not Detected     STREPTOCOCCUS PNEUMONIAE, PCR Not Detected     CYTOMEGALOVIRUS (CMV), PCR Not Detected     ENTEROVIRUS, PCR Not Detected     HERPES SIMPLEX VIRUS 1 (HSV-1), PCR Not Detected     HERPES SIMPLEX VIRUS 2 (HSV-2), PCR Not Detected     HUMAN PARECHOVIRUS, PCR Not Detected     VARICELLA ZOSTER VIRUS (VZV), PCR Not Detected     CRYPTOCOCCUS NEOFORMANS / GATTII, PCR Not Detected     HUMAN HERPES VIRUS 6 PCR Not Detected    Influenza A & B PCR - Swab, Nasopharynx [[id number]]  (Normal) Collected:   11/13/19 0811    Lab Status:  Final result Specimen:  Swab from Nasopharynx Updated:  11/13/19 1023     Influenza A PCR Not Detected     Influenza B PCR Not Detected          Imaging Results (Last 24 Hours)     Procedure Component Value Units Date/Time    US Renal Limited [[id number]] Collected:  11/16/19 0827     Updated:  11/17/19 0921    Narrative:       EXAMINATION: US RENAL LIMITED - 11/15/2019     INDICATION: G93.41-Metabolic encephalopathy; R41.841-Cognitive  communication deficit; Z74.09-Other reduced mobility. Urinary tract  infection.     TECHNIQUE: Sonographic imaging is obtained of the kidneys in both the  sagittal and transverse planes.     COMPARISON: None     FINDINGS: Right kidney measures in length from pole to pole 10.4 cm.  There is no solid cortical mass or renal cortical cysts seen within the  right kidney. No hydronephrosis or nephrolithiasis.     The left kidney measures in length from pole to pole 10.1 cm. No solid  cortical mass or renal cortical cysts seen within the left kidney. No  hydronephrosis or nephrolithiasis. Bladder is grossly unremarkable in  appearance. No underlying mass or lesion.     Incidental cystic structure seen in the right upper quadrant previously  noted to be within the liver. The approximate measurement is 10.2 cm.       Impression:       Unremarkable bilateral renal ultrasound.      DICTATED:   11/16/2019  EDITED/ls :   11/16/2019      This report was finalized on 11/17/2019 9:18 AM by Dr. Sherine Aragon MD.             Results for orders placed during the hospital encounter of 11/13/19   Adult Transthoracic Echo Complete W/ Cont if Necessary Per Protocol (With Agitated Saline)    Narrative · Normal cardiac chamber sizes and wall thicknesses.  · Global and segmental LV wall motion is normal with an estimated LV   ejection fraction >70%.  · There is mild aortic valve sclerosis. I cannot exclude mild aortic   insufficiency.  · There is mitral annular  calcification and mild mitral regurgitation.  · The estimated pulmonary artery pressure is normal.  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 This has the appearance of a fibroelastoma although I   cannot exclude myxoma, organized thrombus,or leaflet calcification from   this study.  · Agitated saline study is negative for intracardiac shunt.          I have reviewed the medications:  Scheduled Meds:  amLODIPine 10 mg Oral Q24H   aspirin 81 mg Oral Daily   atorvastatin 80 mg Oral Nightly   carvedilol 6.25 mg Oral BID With Meals   cefTRIAXone 2 g Intravenous Q24H   famotidine 20 mg Oral BID AC   FLUoxetine 10 mg Oral Daily   insulin lispro 0-7 Units Subcutaneous 4x Daily With Meals & Nightly   levothyroxine 75 mcg Oral Daily   lidocaine 5 mL Subcutaneous Once   lisinopril 20 mg Oral Q12H   pantoprazole 40 mg Oral Q AM   saccharomyces boulardii 250 mg Oral Daily   sodium chloride 10 mL Intravenous Q12H     Continuous Infusions:  hold 1 each     PRN Meds:.•  hold  •  acetaminophen **OR** acetaminophen  •  dextrose  •  dextrose  •  glucagon (human recombinant)  •  hydrALAZINE  •  magnesium sulfate **OR** magnesium sulfate **OR** magnesium sulfate  •  ondansetron  •  ondansetron  •  potassium chloride **OR** potassium chloride **OR** potassium chloride  •  promethazine **OR** promethazine **OR** promethazine  •  QUEtiapine  •  sodium chloride      Assessment/Plan   Assessment / Plan     Active Hospital Problems    Diagnosis  POA   • Acute metabolic encephalopathy [G93.41]  Yes   • Acute UTI (urinary tract infection) [N39.0]  Yes   • Sepsis, unspecified organism (CMS/HCC) [A41.9]  Yes   • Lactic acidosis [E87.2]  Yes   • Mixed hyperlipidemia [E78.2]  Yes   • Essential hypertension [I10]  Yes   • Acquired hypothyroidism [E03.9]  Yes      Resolved Hospital Problems   No resolved problems to display.        Brief Hospital Course to date:  Aura Fischer is a 79  y.o. female who was found down nearly 20 hours after last known well. She is found to have metabolic encephalopathy likely due to viral illness vs UTI. Her encephalopathy has now resolved.      A/P:  --Continues to do well from mentation standpoint. Her encephalopathy has resolved and was most likely due to acute viral illness and UTI. Continue IV rocephin for now with plan for PO cephalosporin per ID note.  --Neurology has s/o. I have d/w her. Felt to be due to above, and now that resolved has s/o.   --Her CT perfusion, CTA head and neck were largely unremarkable. May need repeat MRI but unable to cooperate at this time. Initial MRI without ischemia.  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 Discussed briefly with cardiology, agree that MARGARITO is next reasonable step. NPO tonight for MARGARITO in am.  --Reviewed BP for past 24 hours. Her HTN remains uncontrolled. On ace inhibitor BID at max. Added amlodipine, now at max. Start coreg.  --Esophageal burning improving. Continue GI cocktail prn along with pepcid, PPI for discomfort.  --Stop IVF.  --CM following. Planning for rehab at d/c.     DVT Prophylaxis:  Mechanical.     Disposition: I expect the patient to be discharged to rehab in 1-2 days.    CODE STATUS:   Code Status and Medical Interventions:   Ordered at: 11/13/19 0803     Level Of Support Discussed With:    Next of Kin (If No Surrogate)     Code Status:    No CPR     Medical Interventions (Level of Support Prior to Arrest):    Full         Electronically signed by Naheed Lowe II, DO, 11/17/19, 11:13 AM.

## 2019-11-18 ENCOUNTER — APPOINTMENT (OUTPATIENT)
Dept: CARDIOLOGY | Facility: HOSPITAL | Age: 79
End: 2019-11-18

## 2019-11-18 LAB
BACTERIA SPEC AEROBE CULT: NORMAL
BACTERIA SPEC AEROBE CULT: NORMAL
GLUCOSE BLDC GLUCOMTR-MCNC: 113 MG/DL (ref 70–130)
GLUCOSE BLDC GLUCOMTR-MCNC: 126 MG/DL (ref 70–130)
GLUCOSE BLDC GLUCOMTR-MCNC: 130 MG/DL (ref 70–130)
GLUCOSE BLDC GLUCOMTR-MCNC: 138 MG/DL (ref 70–130)
LV EF 2D ECHO EST: 60 %

## 2019-11-18 PROCEDURE — 93321 DOPPLER ECHO F-UP/LMTD STD: CPT | Performed by: INTERNAL MEDICINE

## 2019-11-18 PROCEDURE — 97535 SELF CARE MNGMENT TRAINING: CPT

## 2019-11-18 PROCEDURE — 93321 DOPPLER ECHO F-UP/LMTD STD: CPT

## 2019-11-18 PROCEDURE — 93312 ECHO TRANSESOPHAGEAL: CPT

## 2019-11-18 PROCEDURE — 97116 GAIT TRAINING THERAPY: CPT

## 2019-11-18 PROCEDURE — 93312 ECHO TRANSESOPHAGEAL: CPT | Performed by: INTERNAL MEDICINE

## 2019-11-18 PROCEDURE — 25010000002 CEFTRIAXONE PER 250 MG: Performed by: INTERNAL MEDICINE

## 2019-11-18 PROCEDURE — 82962 GLUCOSE BLOOD TEST: CPT

## 2019-11-18 PROCEDURE — 97530 THERAPEUTIC ACTIVITIES: CPT

## 2019-11-18 PROCEDURE — 93325 DOPPLER ECHO COLOR FLOW MAPG: CPT

## 2019-11-18 PROCEDURE — 99152 MOD SED SAME PHYS/QHP 5/>YRS: CPT

## 2019-11-18 PROCEDURE — 92507 TX SP LANG VOICE COMM INDIV: CPT

## 2019-11-18 PROCEDURE — 99232 SBSQ HOSP IP/OBS MODERATE 35: CPT | Performed by: INTERNAL MEDICINE

## 2019-11-18 PROCEDURE — 25010000002 HYDRALAZINE PER 20 MG: Performed by: INTERNAL MEDICINE

## 2019-11-18 PROCEDURE — 25010000002 MIDAZOLAM PER 1 MG: Performed by: INTERNAL MEDICINE

## 2019-11-18 PROCEDURE — 93325 DOPPLER ECHO COLOR FLOW MAPG: CPT | Performed by: INTERNAL MEDICINE

## 2019-11-18 RX ORDER — FAMOTIDINE 20 MG/1
20 TABLET, FILM COATED ORAL
Start: 2019-11-18

## 2019-11-18 RX ORDER — CARVEDILOL 12.5 MG/1
12.5 TABLET ORAL 2 TIMES DAILY WITH MEALS
Status: DISCONTINUED | OUTPATIENT
Start: 2019-11-18 | End: 2019-11-19 | Stop reason: HOSPADM

## 2019-11-18 RX ORDER — CEFUROXIME AXETIL 250 MG/1
250 TABLET ORAL 2 TIMES DAILY
Qty: 20 TABLET | Refills: 0
Start: 2019-11-18 | End: 2019-11-28

## 2019-11-18 RX ORDER — PANTOPRAZOLE SODIUM 40 MG/1
40 TABLET, DELAYED RELEASE ORAL DAILY
Start: 2019-11-18

## 2019-11-18 RX ORDER — MIDAZOLAM HYDROCHLORIDE 1 MG/ML
INJECTION INTRAMUSCULAR; INTRAVENOUS
Status: COMPLETED | OUTPATIENT
Start: 2019-11-18 | End: 2019-11-18

## 2019-11-18 RX ORDER — LISINOPRIL 40 MG/1
40 TABLET ORAL DAILY
Start: 2019-11-18

## 2019-11-18 RX ORDER — SACCHAROMYCES BOULARDII 250 MG
250 CAPSULE ORAL DAILY
Start: 2019-11-19

## 2019-11-18 RX ORDER — ASPIRIN 81 MG/1
81 TABLET, CHEWABLE ORAL DAILY
Start: 2019-11-19

## 2019-11-18 RX ORDER — CARVEDILOL 12.5 MG/1
12.5 TABLET ORAL 2 TIMES DAILY WITH MEALS
Start: 2019-11-18

## 2019-11-18 RX ORDER — AMLODIPINE BESYLATE 10 MG/1
10 TABLET ORAL
Start: 2019-11-19

## 2019-11-18 RX ORDER — ATORVASTATIN CALCIUM 80 MG/1
80 TABLET, FILM COATED ORAL NIGHTLY
Start: 2019-11-18

## 2019-11-18 RX ADMIN — FLUOXETINE 10 MG: 10 CAPSULE ORAL at 08:32

## 2019-11-18 RX ADMIN — CEFTRIAXONE 2 G: 2 INJECTION, POWDER, FOR SOLUTION INTRAMUSCULAR; INTRAVENOUS at 08:33

## 2019-11-18 RX ADMIN — FAMOTIDINE 20 MG: 20 TABLET ORAL at 17:42

## 2019-11-18 RX ADMIN — AMLODIPINE BESYLATE 10 MG: 10 TABLET ORAL at 08:32

## 2019-11-18 RX ADMIN — LISINOPRIL 20 MG: 20 TABLET ORAL at 08:32

## 2019-11-18 RX ADMIN — CARVEDILOL 12.5 MG: 12.5 TABLET, FILM COATED ORAL at 08:33

## 2019-11-18 RX ADMIN — LISINOPRIL 20 MG: 20 TABLET ORAL at 20:12

## 2019-11-18 RX ADMIN — CARVEDILOL 12.5 MG: 12.5 TABLET, FILM COATED ORAL at 17:42

## 2019-11-18 RX ADMIN — ATORVASTATIN CALCIUM 80 MG: 40 TABLET, FILM COATED ORAL at 20:12

## 2019-11-18 RX ADMIN — METHOHEXITAL SODIUM 30 MG: 500 INJECTION, POWDER, LYOPHILIZED, FOR SOLUTION INTRAMUSCULAR; INTRAVENOUS; RECTAL at 10:40

## 2019-11-18 RX ADMIN — SODIUM CHLORIDE, PRESERVATIVE FREE 10 ML: 5 INJECTION INTRAVENOUS at 20:12

## 2019-11-18 RX ADMIN — MIDAZOLAM HYDROCHLORIDE 2 MG: 1 INJECTION, SOLUTION INTRAMUSCULAR; INTRAVENOUS at 10:39

## 2019-11-18 RX ADMIN — METHOHEXITAL SODIUM 20 MG: 500 INJECTION, POWDER, LYOPHILIZED, FOR SOLUTION INTRAMUSCULAR; INTRAVENOUS; RECTAL at 10:43

## 2019-11-18 RX ADMIN — HYDRALAZINE HYDROCHLORIDE 10 MG: 20 INJECTION INTRAMUSCULAR; INTRAVENOUS at 05:54

## 2019-11-18 NOTE — PLAN OF CARE
Problem: Patient Care Overview  Goal: Plan of Care Review  Outcome: Ongoing (interventions implemented as appropriate)   11/18/19 1402   Coping/Psychosocial   Plan of Care Reviewed With patient   OTHER   Outcome Summary Pt perform STS transfer training with SBA to RW and good sequencing. Pt ambulated with RW and  feet with no LOB and cues for safety.    Plan of Care Review   Progress improving

## 2019-11-18 NOTE — PROGRESS NOTES
Continued Stay Note  [facility]     Patient Name: Aura Fischer  MRN: [id number]  Today's Date: 11/18/2019    Admit Date: 11/13/2019    Discharge Plan     Row Name 11/18/19 1408       Plan    Plan  [facility]    Patient/Family in Agreement with Plan  yes    Plan Comments  Called Justin at [facility]. Was on hold for 20 minutes. She never picked up. Unknown if they can accept the patient today. I have left 2 other messages on her voicemail. CM will continue to follow.    Final Discharge Disposition Code  03 - skilled nursing facility (SNF)    Row Name 11/18/19 1125       Plan    Plan  [facility]    Patient/Family in Agreement with Plan  yes    Plan Comments  Spoke with Justin at [facility]. [place] is offering a bed when patient is medically ready for discharge. CM will continue to follow.    Final Discharge Disposition Code  03 - skilled nursing facility (SNF)        Discharge Codes    No documentation.       Expected Discharge Date and Time     Expected Discharge Date Expected Discharge Time    Nov 18, 2019             Pascual Lucio RN

## 2019-11-18 NOTE — PROGRESS NOTES
Continued Stay Note  [facility]     Patient Name: Aura Fischer  MRN: [id number]  Today's Date: 11/18/2019    Admit Date: 11/13/2019    Discharge Plan     Row Name 11/18/19 1125       Plan    Plan  [facility]    Patient/Family in Agreement with Plan  yes    Plan Comments  Spoke with Justin at [facility]. [place] is offering a bed when patient is medically ready for discharge. CM will continue to follow.    Final Discharge Disposition Code  03 - skilled nursing facility (SNF)    Row Name 11/18/19 0847       Plan    Plan  [facility]    Patient/Family in Agreement with Plan  yes    Plan Comments  Left a message with Justin at [facility] about referral. CM will continue to follow.    Final Discharge Disposition Code  03 - skilled nursing facility (SNF)        Discharge Codes    No documentation.       Expected Discharge Date and Time     Expected Discharge Date Expected Discharge Time    Nov 18, 2019             Pascual Lucio RN

## 2019-11-18 NOTE — DISCHARGE PLACEMENT REQUEST
"Caitlin Veliz (79 y.o. Female)   Valentin Lucio, RN Case Manager  [phone]    Date of Birth Social Security Number Address Home Phone MRN    1940  [facility] [place] [id number] [phone] [id number]    [employer] Mar[employer] Status          Presybeterian        Admission Date Admission Type Admitting Provider Attending Provider Department, Room/Bed    11/13/19 Urgent Naheed Lowe II, DO Naheed Lowe II,  [facility] 3G, S367/1    Discharge Date Discharge Disposition Discharge Destination                       Attending Provider:  Naheed Lowe II, DO    Allergies:  Sulfa Antibiotics    Isolation:  None   Infection:  None   Code Status:  No CPR    Ht:  157.5 cm (62.01\")   Wt:  57.4 kg (126 lb 8 oz)    Admission Cmt:  None   Principal Problem:  None                Active Insurance as of 11/13/2019     Primary Coverage     Payor Plan Insurance Group Employer/Plan Group    MEDICARE MEDICARE A & B      Payor Plan Address Payor Plan Phone Number Payor Plan Fax Number Effective Dates    [address] [phone]  3/1/2006 - None Entered    [facility] [id number]       Subscriber Name Subscriber Birth Date Member ID       CAITLIN VELIZ 1940 [id number]           Secondary Coverage     Payor Plan Insurance Group Employer/Plan Group    [place]      Payor Plan Address Payor Plan Phone Number Payor Plan Fax Number Effective Dates    ATTN CLAIMS DEPT [phone]  1/1/2016 - None Entered    [address] [place] [id number]       Subscriber Name Subscriber Birth Date Member ID       CAITLIN VELIZ 1940 [id number]                 Emergency Contacts      (Rel.) Home Phone Work Phone Mobile Phone    Nicolas Veliz (Son) [phone] -- --    Gabriella Mijares (Sister) -- -- [phone]               History & Physical      Day, Adelita PINEDA MD at 11/13/19 0317              [facility] " Services  HISTORY AND PHYSICAL    Patient Name: Aura Fischer  : 1940  MRN: [id number]  Primary Care Physician: Jose Manuel Cabrales MD  Date of admission: 2019      Subjective   Subjective     Chief Complaint:  confusion    HPI:  Aura Fischer is a 79 y.o. female who was transferred here from [place] for confusion was last known well on  pm and was found down in home  evening.  Son states he had spoken w/ her the night before and that she was doing well and was in her normal state of health living alone and very independent.  Pt was found unresponsive in bed w/ fecal incontinence.  Per son, pt has not improved at all since that time and has garbled speech and stares off.  Reportedly pt was able to tell nurse her name and answer simple questions but not always correctly.  Fevers started today as far as son knows.  No prior hx of anything like this.      Review of Systems    unable to obtain    All other systems reviewed and are negative.     Personal History     Past Medical History:   Diagnosis Date   • Anemia    • Diverticulosis    • Hyperlipidemia    • Hypertension    • Internal hemorrhoid    • Mood disorder (CMS/HCC)    • Thyroid disease        Past Surgical History:   Procedure Laterality Date   • CHOLECYSTECTOMY     • HEMORRHOIDECTOMY     • PANCREATECTOMY     • SPLENECTOMY         Family History: family history is not on file. Otherwise pertinent FHx was reviewed and unremarkable.     Social History:  reports that she has never smoked. She does not have any smokeless tobacco history on file. She reports that she does not drink alcohol or use drugs.  Social History     Social History Narrative   • Not on file       Medications:  Available home medication information reviewed.  Medications Prior to Admission   Medication Sig Dispense Refill Last Dose   • alendronate (FOSAMAX) 70 MG tablet Take 1 tablet by mouth 1 (one) time per week.   Unknown at Unknown time   • atorvastatin  (LIPITOR) 10 MG tablet TAKE 1 TABLET AT BEDTIME 90 tablet 1 Unknown at Unknown time   • FLUoxetine (PROzac) 10 MG capsule Take 1 capsule by mouth daily.   Unknown at Unknown time   • levothyroxine (SYNTHROID, LEVOTHROID) 75 MCG tablet TAKE 1 TABLET EVERY DAY (NEED MD APPOINTMENT) 90 tablet 3 Unknown at Unknown time   • promethazine (PHENERGAN) 25 MG tablet Take  by mouth.   Unknown at Unknown time   • trandolapril (MAVIK) 2 MG tablet TAKE 1 TABLET EVERY DAY 90 tablet 5 Unknown at Unknown time   • vitamin d (RA VITAMIN D-3) 5000 UNITS capsule Take 1 tablet by mouth daily.   Unknown at Unknown time       Allergies   Allergen Reactions   • Sulfa Antibiotics        Objective   Objective     Vital Signs:   Temp:  [100.4 °F (38 °C)-101.2 °F (38.4 °C)] 100.4 °F (38 °C)  Heart Rate:  [87] 87  Resp:  [16] 16  BP: (177)/(83) 177/83   Total (NIH Stroke Scale): 22    Physical Exam    gen; will open eyes to painful stimuli and gazes mostly to the left w/ very mumbled quiet speech  Heent; wandering eyes, pasty mm  Cv; rrr, no mrg  L; ctab, poor inspir effort  Abd; soft, +bs, ?suprapubic ttp  Ext; no cce, 2+ pulses  Skin; pale, cdi, warm  Neuro; pt able to  w/ left>right; keeps right arm contracted for most part; spontaneously moves ble but not purposeful.  No obvious facial .  Tends to gaze to left.  Psych; unable to asses    Results Reviewed:  I have personally reviewed current lab and radiology data.              Invalid input(s):  ALKPHOS  CrCl cannot be calculated (Patient's most recent lab result is older than the maximum 30 days allowed.).  Brief Urine Lab Results     None        Imaging Results (Last 24 Hours)     ** No results found for the last 24 hours. **             Assessment/Plan   Assessment / Plan     Active Hospital Problems    Diagnosis POA   • Encephalopathy [G93.40] Yes   • Acute UTI (urinary tract infection) [N39.0] Yes   • Sepsis, unspecified organism (CMS/HCC) [A41.9] Yes   • Immunosuppressed status  (CMS/HCC) [D89.9] Yes   • Lactic acidosis [E87.2] Yes   • Mixed hyperlipidemia [E78.2] Yes   • Essential hypertension [I10] Yes   • Acquired hypothyroidism [E03.9] Yes     78 y/o immunocompromised (splenectomy) female who was found down nearly 20 hours after last known well;  1. Encephalopathy;  -?infectious vs sz vs stroke  a. Sepsis;  -pt w/ fever, tachycardia, lactic acidosis, leukocytosis  -UA positive but not significant enough for her level of acuity so do not think this is primary culprit  -check blood cultures, cxr; may need LP pending remainder of eval and will place on vanc, cefepime, and ampicillin to cover for meningitis given immunocompromised w/ altered level of consciousness and fever.  b. ?stroke w/ nih16-->22 now  -pt w/ garbled speech and tends to gaze to left w/ ?rue contracted  -stat mri, stat cta h/n, stat ct perfusion  -neurology notified by osh  c. ?sz; pt found incontinent of feces  -plan eeg today; neurology notified prior to transfer  2. Lactic acidosis; ns and repeat.  Related to above likely but ?abd pain.  May  Need CT pending results of above  3. Hypothyroidism; check tsh; cont synthroid  4. Htn; permissive htn for now  5. Hyperlipidemia; high dose statin ordered; flp    DVT prophylaxis:  scd's    CODE STATUS:    Code Status and Medical Interventions:   Ordered at: 11/13/19 0315     Code Status:    CPR     Medical Interventions (Level of Support Prior to Arrest):    Full       Admission Status:  I believe this patient meets  INPATIENT status due to acute encephalopathy w/ sepsis and ?stroke.  I feel patient’s risk for adverse outcomes and need for care warrant INPATIENT evaluation and I predict the patient’s care encounter to likely last beyond 2 midnights.    Electronically signed by Adelita Lua MD, 11/13/19, 3:17 AM.  75 minutes of critical care provided. This time excludes other billable procedures. Time does include preparation of documents, medical consultations, review of old  "records, and direct bedside care. Patient was at high risk for life-threatening deterioration due to acute encephalopathy of uncertain etiology w/ sepsis and ?stroke.     Electronically signed by Adelita Lua MD at 11/13/19 0347       Vital Signs (last day)     Date/Time   Temp   Temp src   Pulse   Resp   BP   Patient Position   SpO2    11/18/19 0725   98.8 (37.1)   Oral   91   16   159/86   Lying   96    11/18/19 0532   98.5 (36.9)   Oral   73   16   188/80  (Abnormal)    Lying   --    11/18/19 0000   99 (37.2)   Oral   83   18   162/78   Lying   94    11/17/19 2041   --   --   --   --   178/89   --   --    11/17/19 1911   98 (36.7)   Oral   87   18   167/81   Lying   95    11/17/19 1530   99.7 (37.6)   Axillary   81   16   149/79   Lying   94    11/17/19 0938   --   --   --   --   172/74   --   --    11/17/19 0729   --   --   --   --   186/105  (Abnormal)    --   --    11/17/19 0706   98.2 (36.8)   Oral   91   16   180/90   Lying   95    11/17/19 0324   98.3 (36.8)   Oral   96   16   162/78   Lying   93    11/17/19 0036   98.6 (37)   Oral   111   18   162/74   Lying   95              Hospital Medications (active)       Dose Frequency Start End    ! Hold anticoagulants 24hr prior to lumbar puncture 1 each Continuous PRN 11/13/2019     Sig - Route: 1 each Continuous As Needed (Lumbar Puncture). - Does not apply    acetaminophen (TYLENOL) suppository 650 mg 650 mg Every 4 Hours PRN 11/13/2019     Sig - Route: Insert 1 suppository into the rectum Every 4 (Four) Hours As Needed for Mild Pain  or Fever (Temperature greater than or equal to 37 C). - Rectal    Linked Group 1:  \"Or\" Linked Group Details        acetaminophen (TYLENOL) tablet 650 mg 650 mg Every 4 Hours PRN 11/13/2019     Sig - Route: Take 2 tablets by mouth Every 4 (Four) Hours As Needed for Mild Pain  or Fever (Temperature greater than or equal to 37 C). - Oral    Linked Group 1:  \"Or\" Linked Group Details        amLODIPine (NORVASC) tablet 10 mg 10 mg " Every 24 Hours Scheduled 11/16/2019     Sig - Route: Take 1 tablet by mouth Daily. - Oral    aspirin chewable tablet 81 mg 81 mg Daily 11/13/2019     Sig - Route: Chew 1 tablet Daily. - Oral    atorvastatin (LIPITOR) tablet 80 mg 80 mg Nightly 11/13/2019     Sig - Route: Take 2 tablets by mouth Every Night. - Oral    carvedilol (COREG) tablet 12.5 mg 12.5 mg 2 Times Daily With Meals 11/18/2019     Sig - Route: Take 1 tablet by mouth 2 (Two) Times a Day With Meals. - Oral    cefTRIAXone (ROCEPHIN) 2 g/100 mL 0.9% NS VTB (JOSLYN) 2 g Every 24 Hours Scheduled 11/14/2019 11/21/2019    Sig - Route: Infuse 100 mL into a venous catheter Daily. - Intravenous    dextrose (D50W) 25 g/ 50mL Intravenous Solution 25 g 25 g Every 15 Minutes PRN 11/13/2019     Sig - Route: Infuse 50 mL into a venous catheter Every 15 (Fifteen) Minutes As Needed for Low Blood Sugar (Blood Sugar Less Than 70). - Intravenous    dextrose (GLUTOSE) oral gel 15 g 15 g Every 15 Minutes PRN 11/13/2019     Sig - Route: Take 15 application by mouth Every 15 (Fifteen) Minutes As Needed for Low Blood Sugar (Blood sugar less than 70). - Oral    famotidine (PEPCID) tablet 20 mg 20 mg 2 Times Daily Before Meals 11/15/2019     Sig - Route: Take 1 tablet by mouth 2 (Two) Times a Day Before Meals. - Oral    FLUoxetine (PROzac) capsule 10 mg 10 mg Daily 11/15/2019     Sig - Route: Take 1 capsule by mouth Daily. - Oral    glucagon (human recombinant) (GLUCAGEN DIAGNOSTIC) injection 1 mg 1 mg Every 15 Minutes PRN 11/13/2019     Sig - Route: Inject 1 mg under the skin into the appropriate area as directed Every 15 (Fifteen) Minutes As Needed for Low Blood Sugar (Blood Glucose Less Than 70). - Subcutaneous    hydrALAZINE (APRESOLINE) injection 10 mg 10 mg Every 6 Hours PRN 11/14/2019     Sig - Route: Infuse 0.5 mL into a venous catheter Every 6 (Six) Hours As Needed for High Blood Pressure. - Intravenous    insulin lispro (humaLOG) injection 0-7 Units 0-7 Units 4 Times  "Daily With Meals & Nightly 11/13/2019     Sig - Route: Inject 0-7 Units under the skin into the appropriate area as directed 4 (Four) Times a Day With Meals & at Bedtime. - Subcutaneous    levothyroxine (SYNTHROID, LEVOTHROID) tablet 75 mcg 75 mcg Daily 11/13/2019     Sig - Route: Take 1 tablet by mouth Daily. - Oral    lidocaine (XYLOCAINE) 1 % injection 5 mL 5 mL Once 11/13/2019     Sig - Route: Inject 5 mL under the skin into the appropriate area as directed 1 (One) Time. - Subcutaneous    Cosign for Ordering: Accepted by Vidal Correa MD on 11/14/2019 10:56 AM    lisinopril (PRINIVIL,ZESTRIL) tablet 20 mg 20 mg Every 12 Hours Scheduled 11/15/2019     Sig - Route: Take 1 tablet by mouth Every 12 (Twelve) Hours. - Oral    Magnesium Sulfate 2 gram / 50mL Infusion (GIVE X 3 BAGS TO EQUAL 6GM TOTAL DOSE) - Mg 1.1 - 1.5 mg/dl 2 g As Needed 11/15/2019     Sig - Route: Infuse 50 mL into a venous catheter As Needed (See Administration Instructions). - Intravenous    Linked Group 2:  \"Or\" Linked Group Details        Magnesium Sulfate 2 gram Bolus, followed by 8 gram infusion (total Mg dose 10 grams)- Mg less than or equal to 1mg/dL 2 g As Needed 11/15/2019     Sig - Route: Infuse 50 mL into a venous catheter As Needed (See Administration Instructions). - Intravenous    Linked Group 2:  \"Or\" Linked Group Details        Magnesium Sulfate 4 gram infusion- Mg 1.6-1.9 mg/dL 4 g As Needed 11/15/2019     Sig - Route: Infuse 100 mL into a venous catheter As Needed (See Administration Instructions). - Intravenous    Linked Group 2:  \"Or\" Linked Group Details        ondansetron (ZOFRAN) injection 4 mg 4 mg Every 6 Hours PRN 11/13/2019     Sig - Route: Infuse 2 mL into a venous catheter Every 6 (Six) Hours As Needed for Nausea or Vomiting. - Intravenous    ondansetron (ZOFRAN) tablet 4 mg 4 mg Once As Needed 11/13/2019     Sig - Route: Take 1 tablet by mouth 1 (One) Time As Needed for Nausea or Vomiting. - Oral    pantoprazole " "(PROTONIX) EC tablet 40 mg 40 mg Every Early Morning 11/15/2019     Sig - Route: Take 1 tablet by mouth Every Morning. - Oral    potassium chloride (KLOR-CON) packet 40 mEq 40 mEq As Needed 11/15/2019     Sig - Route: Take 40 mEq by mouth As Needed (potassium replacement, see admin instructions). - Oral    Linked Group 3:  \"Or\" Linked Group Details        potassium chloride (MICRO-K) CR capsule 40 mEq 40 mEq As Needed 11/15/2019     Sig - Route: Take 4 capsules by mouth As Needed (Potassium Replacement.  See Admin Instructions). - Oral    Linked Group 3:  \"Or\" Linked Group Details        potassium chloride 10 mEq in 100 mL IVPB 10 mEq Every 1 Hour PRN 11/15/2019     Sig - Route: Infuse 100 mL into a venous catheter Every 1 (One) Hour As Needed (Potassium Replacement - See Admin Instructions). - Intravenous    Linked Group 3:  \"Or\" Linked Group Details        promethazine (PHENERGAN) injection 12.5 mg 12.5 mg Every 6 Hours PRN 11/14/2019     Sig - Route: Inject 0.5 mL into the appropriate muscle as directed by prescriber Every 6 (Six) Hours As Needed for Nausea or Vomiting. - Intramuscular    Linked Group 4:  \"Or\" Linked Group Details        promethazine (PHENERGAN) suppository 12.5 mg 12.5 mg Every 6 Hours PRN 11/14/2019     Sig - Route: Insert 1 suppository into the rectum Every 6 (Six) Hours As Needed for Nausea or Vomiting. - Rectal    Linked Group 4:  \"Or\" Linked Group Details        promethazine (PHENERGAN) tablet 12.5 mg 12.5 mg Every 6 Hours PRN 11/14/2019     Sig - Route: Take 1 tablet by mouth Every 6 (Six) Hours As Needed for Nausea or Vomiting. - Oral    Linked Group 4:  \"Or\" Linked Group Details        QUEtiapine (SEROquel) tablet 25 mg 25 mg Nightly PRN 11/14/2019     Sig - Route: Take 1 tablet by mouth At Night As Needed (Insomnia, agitation, psychosis). - Oral    saccharomyces boulardii (FLORASTOR) capsule 250 mg 250 mg Daily 11/15/2019     Sig - Route: Take 1 capsule by mouth Daily. - Oral    sodium " chloride 0.9 % flush 10 mL 10 mL Every 12 Hours Scheduled 2019     Sig - Route: Infuse 10 mL into a venous catheter Every 12 (Twelve) Hours. - Intravenous    sodium chloride 0.9 % flush 10 mL 10 mL As Needed 2019     Sig - Route: Infuse 10 mL into a venous catheter As Needed for Line Care. - Intravenous    carvedilol (COREG) tablet 6.25 mg (Discontinued) 6.25 mg 2 Times Daily With Meals 2019    Sig - Route: Take 1 tablet by mouth 2 (Two) Times a Day With Meals. - Oral    sodium chloride 0.9 % infusion (Discontinued) 100 mL/hr Continuous 2019    Sig - Route: Infuse 100 mL/hr into a venous catheter Continuous. - Intravenous          Physician Progress Notes (last 24 hours) (Notes from 19 09 through 19 09)     No notes of this type exist for this encounter.           Physical Therapy Notes (most recent note)      Zina Brito, PT at 19 1601  Version 1 of 1         Patient Name: Aura Fischer  : 1940    MRN: [id number]                              Today's Date: 2019       Admit Date: 2019    Visit Dx:     ICD-10-CM ICD-9-CM   1. Acute metabolic encephalopathy G93.41 348.31   2. Cognitive communication deficit R41.841 799.52   3. Impaired mobility and ADLs Z74.09 799.89     Patient Active Problem List   Diagnosis   • Rectal bleeding   • Healthcare maintenance   • Anemia   • Anxiety disorder   • Gastroesophageal reflux disease without esophagitis   • Mixed hyperlipidemia   • Essential hypertension   • Acquired hypothyroidism   • Recurrent pancreatitis   • Depression   • Osteoporosis   • Acute metabolic encephalopathy   • Acute UTI (urinary tract infection)   • Sepsis, unspecified organism (CMS/HCC)   • Lactic acidosis     Past Medical History:   Diagnosis Date   • Anemia    • Diverticulosis    • Hyperlipidemia    • Hypertension    • Internal hemorrhoid    • Mood disorder (CMS/HCC)    • Thyroid disease      Past Surgical History:    Procedure Laterality Date   • BREAST SURGERY     • CHOLECYSTECTOMY     • HEMORRHOIDECTOMY     • HYSTERECTOMY     • PANCREATECTOMY     • SPLENECTOMY       General Information     [facility] Name 11/16/19 1529          PT Evaluation Time/Intention    Document Type  therapy note (daily note)  -     Mode of Treatment  individual therapy  -[facility] Name 11/16/19 1529          General Information    Existing Precautions/Restrictions  fall  -[facility] Name 11/16/19 1529          Cognitive Assessment/Intervention- PT/OT    Orientation Status (Cognition)  oriented x 4  -     Cognitive Assessment/Intervention Comment  alert, following commands. [place], so commands often need to be repeated.  -[facility] Name 11/16/19 1529          Safety Issues, Functional Mobility    Safety Issues Affecting Function (Mobility)  ability to follow commands;insight into deficits/self awareness;sequencing abilities  -     Impairments Affecting Function (Mobility)  endurance/activity tolerance;balance  -       User Key  (r) = Recorded By, (t) = Taken By, (c) = Cosigned By    Initials Name Provider Type     Zina Brito, PT Physical Therapist        Mobility     [facility] Name 11/16/19 1552          Bed Mobility Assessment/Treatment    Bed Mobility Assessment/Treatment  supine-sit  -     Supine-Sit [place] (Bed Mobility)  verbal cues;supervision  -     Comment (Bed Mobility)  good safety awareness  -[facility] Name 11/16/19 1552          Transfer Assessment/Treatment    Comment (Transfers)  pt performed bathroom toilet t/f with supervision  -SJ     Row Name 11/16/19 1552          Sit-Stand Transfer    Sit-Stand [place] (Transfers)  stand by assist  -     Assistive Device (Sit-Stand Transfers)  walker, front-wheeled  -SJ     Row Name 11/16/19 1552          Gait/Stairs Assessment/Training    [place] Level (Gait)  contact guard  -     Assistive Device (Gait)  walker, front-wheeled  -     Distance in Feet (Gait)  200  -SJ      Pattern (Gait)  step-through  -SJ     Deviations/Abnormal Patterns (Gait)  base of support, narrow;gait speed decreased  -SJ     Bilateral Gait Deviations  forward flexed posture;heel strike decreased  -SJ     Comment (Gait/Stairs)  no LOB, improved balance with use of RW  -       User Key  (r) = Recorded By, (t) = Taken By, (c) = Cosigned By    Initials Name Provider Type    SJ Zina Brito, PT Physical Therapist        Obj/Interventions     Row Name 11/16/19 155          Dynamic Sitting Balance    Level of [place], Reaches Outside Midline (Sitting, Dynamic Balance)  supervision  -     Sitting Position, Reaches Outside Midline (Sitting, Dynamic Balance)  sitting on edge of bed toilet  -[facility] Name 11/16/19 1556          Static Standing Balance    Level of [place] (Supported Standing, Static Balance)  supervision  -       User Key  (r) = Recorded By, (t) = Taken By, (c) = Cosigned By    Initials Name Provider Type    SJ Zina Brito, PT Physical Therapist        Goals/Plan    No documentation.       Clinical Impression     Row Name 11/16/19 1556          Pain Assessment    Additional Documentation  Pain Scale: Numbers Pre/Post-Treatment (Group)  -[facility] 11/16/19 1556          Pain Scale: Numbers Pre/Post-Treatment    Pain Scale: Numbers, Pretreatment  0/10 - no pain  -     Pain Scale: Numbers, Post-Treatment  0/10 - no pain  -SJ     Row Name 11/16/19 1556          Vital Signs    Pre Systolic BP Rehab  178  -SJ     Pre Treatment Diastolic BP  87  -SJ     Pretreatment Heart Rate (beats/min)  109  -SJ     Pre SpO2 (%)  94  -SJ     O2 Delivery Pre Treatment  room air  -[facility] 11/16/19 1558          Positioning and Restraints    Pre-Treatment Position  in bed  -SJ     Post Treatment Position  chair  -SJ     In Chair  reclined;call light within reach;encouraged to call for assist;exit alarm on;waffle cushion;heels elevated  -SJ       User Key  (r) = Recorded By, (t) =  Taken By, (c) = Cosigned By    Initials Name Provider Type     Zina Brito PT Physical Therapist        Outcome Measures    No documentation.       Physical Therapy Education     Title: PT OT SLP Therapies (In Progress)     Topic: Physical Therapy (In Progress)     Point: Mobility training (In Progress)     Learning Progress Summary           Patient Acceptance, E, NR by  at 11/16/2019  3:59 PM    Eager, E, VU by SC at 11/15/2019  3:45 PM    Comment:  reviewed benefits of activity                   Point: Home exercise program (In Progress)     Learning Progress Summary           Patient Acceptance, E, NR by  at 11/16/2019  3:59 PM    Eager, E, VU by SC at 11/15/2019  3:45 PM    Comment:  reviewed benefits of activity                   Point: Body mechanics (In Progress)     Learning Progress Summary           Patient Acceptance, E, NR by  at 11/16/2019  3:59 PM    Eager, E, VU by SC at 11/15/2019  3:45 PM    Comment:  reviewed benefits of activity                   Point: Precautions (In Progress)     Learning Progress Summary           Patient Acceptance, E, NR by  at 11/16/2019  3:59 PM    Eager, E, VU by SC at 11/15/2019  3:45 PM    Comment:  reviewed benefits of activity                               User Key     Initials Effective Dates Name Provider Type Discipline    SC 06/19/15 -  Tammie Langley PT Physical Therapist PT     06/19/15 -  Zina Brito PT Physical Therapist PT              PT Recommendation and Plan     Plan of Care Reviewed With: patient  Progress: improving  Outcome Summary: Pt able to increase ambulation distance to 200ft with RW with CGA. Pt performed t/f's with supervision, no LOB throughout session. Continue POC.     Time Calculation:   PT Charges     Row Name 11/16/19 1529             Time Calculation    Start Time  1529  -      PT Received On  11/16/19  -      PT Goal Re-Cert Due Date  11/25/19  -         Time Calculation- PT    Total Timed Code Minutes-  PT  23 minute(s)  -SJ         Timed Charges    70262 - Gait Training Minutes   23  -SJ        User Key  (r) = Recorded By, (t) = Taken By, (c) = Cosigned By    Initials Name Provider Type    Zina Marie PT Physical Therapist        Therapy Charges for Today     Code Description Service Date Service Provider Modifiers Qty    [id number] HC GAIT TRAINING EA 15 MIN 11/16/2019 Zina Brito PT GP 2          PT G-Codes  Outcome Measure Options: AM-PAC 6 Clicks Basic Mobility (PT)  AM-PAC 6 Clicks Score (PT): 21  AM-PAC 6 Clicks Score (OT): 19    Zina Brito PT  11/16/2019         Electronically signed by Zina Brito PT at 11/16/19 1601       Occupational Therapy Notes (last 24 hours) (Notes from 11/17/19 0904 through 11/18/19 0904)     No notes of this type exist for this encounter.

## 2019-11-18 NOTE — PROGRESS NOTES
Continued Stay Note  [facility]     Patient Name: Aura Fischer  MRN: [id number]  Today's Date: 11/18/2019    Admit Date: 11/13/2019    Discharge Plan     Row Name 11/18/19 1504       Plan    Plan  [facility]    Patient/Family in Agreement with Plan  yes    Plan Comments  Called Ashlyn at [facility] and left another message. CM let ashlyn know that the patient is ready for discharge. CM will continue to follow.    Final Discharge Disposition Code  03 - skilled nursing facility (SNF)    Row Name 11/18/19 1408       Plan    Plan  [facility]    Patient/Family in Agreement with Plan  yes    Plan Comments  Called Ashlyn at [facility]. Was on hold for 20 minutes. She never picked up. Unknown if they can accept the patient today. I have left 2 other messages on her voicemail. CM will continue to follow.    Final Discharge Disposition Code  03 - skilled nursing facility (SNF)        Discharge Codes    No documentation.       Expected Discharge Date and Time     Expected Discharge Date Expected Discharge Time    Nov 19, 2019             Pascual Lucio RN

## 2019-11-18 NOTE — THERAPY TREATMENT NOTE
Acute Care - Occupational Therapy Treatment Note  [facility]     Patient Name: Aura Fischer  : 1940  MRN: [id number]  Today's Date: 2019  Onset of Illness/Injury or Date of Surgery: 19  Date of Referral to OT: 19  Referring Physician: Dr. Lua    Admit Date: 2019       ICD-10-CM ICD-9-CM   1. Acute metabolic encephalopathy G93.41 348.31   2. Cognitive communication deficit R41.841 799.52   3. Impaired mobility and ADLs Z74.09 799.89     Patient Active Problem List   Diagnosis   • Rectal bleeding   • Healthcare maintenance   • Anemia   • Anxiety disorder   • Gastroesophageal reflux disease without esophagitis   • Mixed hyperlipidemia   • Essential hypertension   • Acquired hypothyroidism   • Recurrent pancreatitis   • Depression   • Osteoporosis   • Acute metabolic encephalopathy   • Acute UTI (urinary tract infection)   • Sepsis, unspecified organism (CMS/HCC)   • Lactic acidosis     Past Medical History:   Diagnosis Date   • Anemia    • Diverticulosis    • Hyperlipidemia    • Hypertension    • Internal hemorrhoid    • Mood disorder (CMS/HCC)    • Thyroid disease      Past Surgical History:   Procedure Laterality Date   • BREAST SURGERY     • CHOLECYSTECTOMY     • HEMORRHOIDECTOMY     • HYSTERECTOMY     • PANCREATECTOMY     • SPLENECTOMY         Therapy Treatment    Rehabilitation Treatment Summary     Row Name 19 0853             Treatment Time/Intention    Discipline  occupational therapist  -LC      Document Type  therapy note (daily note)  -LC      Subjective Information  no complaints  -LC      Mode of Treatment  individual therapy;occupational therapy  -LC      Patient/Family Observations  Pt. in bed on arrival   -LC      Care Plan Review  patient/other agree to care plan  -LC      Patient Effort  good  -LC      Existing Precautions/Restrictions  fall;seizures  -      Treatment Considerations/Comments  [facility]   -LC      Recorded by [LC] Malena Gaston, OT 19 [id number]       Row Name 11/18/19 0853             Cognitive Assessment/Intervention    Additional Documentation  Cognitive Assessment/Intervention (Group)  -LC      Recorded by [] Malena Gaston OT 11/18/19 1017      Row Name 11/18/19 0853             Cognitive Assessment/Intervention- PT/OT    Affect/Mental Status (Cognitive)  WFL  -LC      Orientation Status (Cognition)  oriented x 4  -LC      Follows Commands (Cognition)  follows one step commands;over 90% accuracy;verbal cues/prompting required  -LC      Recorded by [LC] Malena Gaston OT 11/18/19 1017      Row Name 11/18/19 0853             Safety Issues, Functional Mobility    Safety Issues Affecting Function (Mobility)  insight into deficits/self awareness;safety precaution awareness;safety precautions follow-through/compliance  -LC      Impairments Affecting Function (Mobility)  endurance/activity tolerance;balance  -LC      Recorded by [LC] Malena Gaston OT 11/18/19 1017      Row Name 11/18/19 0853             Bed Mobility Assessment/Treatment    Bed Mobility Assessment/Treatment  supine-sit;scooting/bridging  -LC      Scooting/Bridging [place] (Bed Mobility)  independent  -LC      Supine-Sit [place] (Bed Mobility)  supervision  -LC      Assistive Device (Bed Mobility)  bed rails;head of bed elevated  -LC      Comment (Bed Mobility)  Good safety awareness to transition from supine to sit EOB.   -LC      Recorded by [LC] Malena Gaston OT 11/18/19 1017      Row Name 11/18/19 0853             Functional Mobility    Functional Mobility- Ind. Level  standby assist  -LC      Functional Mobility- Device  rolling walker  -LC      Functional Mobility-Distance (Feet)  20  -      Functional Mobility- Comment  Pt. ambulated from bed to bathroom and back to chair.   -LC      Recorded by [LC] Malena Gaston OT 11/18/19 1017      Row Name 11/18/19 0853             Transfer Assessment/Treatment    Transfer Assessment/Treatment  sit-stand transfer;stand-sit  transfer;toilet transfer  -      Comment (Transfers)  Pt. demonstrated good safety with STS, vc's for hand placement during toileting task,  -LC      Recorded by [LC] Malena Gaston, OT 11/18/19 1017      Row Name 11/18/19 0853             Sit-Stand Transfer    Sit-Stand [place] (Transfers)  stand by assist  -      Assistive Device (Sit-Stand Transfers)  walker, front-wheeled  -      Recorded by [LC] Malena Gaston, OT 11/18/19 1017      Row Name 11/18/19 0853             Stand-Sit Transfer    Stand-Sit [place] (Transfers)  stand by assist  -      Assistive Device (Stand-Sit Transfers)  walker, front-wheeled  -      Recorded by [LC] Malena Gaston OT 11/18/19 1017      Row Name 11/18/19 0853             Toilet Transfer    Type (Toilet Transfer)  sit-stand;stand-sit  -      [place] Level (Toilet Transfer)  contact guard;verbal cues  -      Assistive Device (Toilet Transfer)  walker, front-wheeled;raised toilet seat;grab bars/safety frame  -LC      Recorded by [LC] Malena Gaston OT 11/18/19 1017      Row Name 11/18/19 0853             ADL Assessment/Intervention    68666 - OT Self Care/Mgmt Minutes  26  -      BADL Assessment/Intervention  grooming;toileting  -LC      Recorded by [LC] Malena Gaston OT 11/18/19 1017      Row Name 11/18/19 0853             Grooming Assessment/Training    [place] Level (Grooming)  hair care, combing/brushing;oral care regimen;wash face, hands;set up;supervision  -      Grooming Position  supported standing  -      Comment (Grooming)  Pt. stood at sink with with RWx to complete grooming tasks. Increased time needed for oral care. NO LOB noted.   -LC      Recorded by [LC] Malena Gaston OT 11/18/19 1017      Row Name 11/18/19 0853             Toileting Assessment/Training    [place] Level (Toileting)  adjust/manage clothing;perform perineal hygiene;supervision;verbal cues  -      Toileting Position  unsupported sitting;supported standing   -LC      Comment (Toileting)  Pt. completed all steps of toileting with close SUP, VC's for safety/hand placement when standing  -LC      Recorded by [LC] Malena Gaston OT 11/18/19 1017      Row Name 11/18/19 0853             BADL Safety/Performance    Impairments, BADL Safety/Performance  balance;endurance/activity tolerance;strength  -LC      Cognitive Impairments, BADL Safety/Performance  insight into deficits/self awareness;safety precaution awareness;safety precaution follow-through  -LC      Recorded by [LC] Malena Gaston OT 11/18/19 1017      Row Name 11/18/19 0853             Balance    Balance  static sitting balance;static standing balance;dynamic standing balance  -LC      Recorded by [LC] Malena Gaston OT 11/18/19 1017      Row Name 11/18/19 0853             Static Sitting Balance    Level of [place] (Unsupported Sitting, Static Balance)  independent  -LC      Sitting Position (Unsupported Sitting, Static Balance)  sitting on edge of bed  -LC      Time Able to Maintain Position (Unsupported Sitting, Static Balance)  2 to 3 minutes  -LC      Recorded by [LC] Malena Gaston OT 11/18/19 1017      Row Name 11/18/19 0853             Dynamic Sitting Balance    Level of [place], Reaches Outside Midline (Sitting, Dynamic Balance)  supervision  -LC      Sitting Position, Reaches Outside Midline (Sitting, Dynamic Balance)  other (see comments) Raised toilet   -LC      Comment, Reaches Outside Midline (Sitting, Dynamic Balance)  Completing unruly care   -LC      Recorded by [LC] Malena Gaston OT 11/18/19 1017      Row Name 11/18/19 0853             Static Standing Balance    Level of [place] (Supported Standing, Static Balance)  standby assist  -LC      Time Able to Maintain Position (Supported Standing, Static Balance)  3 to 4 minutes  -LC      Assistive Device Utilized (Supported Standing, Static Balance)  walker, rolling  -LC      Recorded by [LC] Malena Gaston OT 11/18/19 1017      Row  Name 11/18/19 0853             Dynamic Standing Balance    Level of [place], Reaches Outside Midline (Standing, Dynamic Balance)  standby assist  -      Time Able to Maintain Position, Reaches Outside Midline (Standing, Dynamic Balance)  more than 5 minutes  -      Assistive Device Utilized (Supported Standing, Dynamic Balance)  walker, rolling  -      Comment, Reaches Outside Midline (Standing, Dynamic Balance)  Completing grooming tasks at sink   -      Recorded by [] Malena Gaston OT 11/18/19 1017      Row Name 11/18/19 0853             Positioning and Restraints    Pre-Treatment Position  in bed  -      Post Treatment Position  chair  -LC      In Chair  reclined;call light within reach;encouraged to call for assist;exit alarm on;waffle cushion;legs elevated  -      Recorded by [] Malena Gaston OT 11/18/19 1017      Row Name 11/18/19 0853             Pain Assessment    Additional Documentation  Pain Scale: Numbers Pre/Post-Treatment (Group)  -      Recorded by [] Malena Gaston OT 11/18/19 1017      Row Name 11/18/19 0853             Pain Scale: Numbers Pre/Post-Treatment    Pain Scale: Numbers, Pretreatment  0/10 - no pain  -      Pain Scale: Numbers, Post-Treatment  0/10 - no pain  -      Recorded by [] Malena Gaston OT 11/18/19 1017      Row Name 11/18/19 0853             Plan of Care Review    Plan of Care Reviewed With  patient  -      Recorded by [] Malena Gaston OT 11/18/19 1017      Row Name 11/18/19 0853             Outcome Summary/Treatment Plan (OT)    Daily Summary of Progress (OT)  progress toward functional goals as expected  -      Anticipated Discharge Disposition (OT)  inpatient rehabilitation facility  -      Recorded by [] Malena Gaston OT 11/18/19 1017        User Key  (r) = Recorded By, (t) = Taken By, (c) = Cosigned By    Initials Name Effective Dates Discipline     Malena Gaston OT 07/18/19 -  OT             Occupational Therapy  Education     Title: PT OT SLP Therapies (In Progress)     Topic: Occupational Therapy (Done)     Point: ADL training (Done)     Description: Instruct learner(s) on proper safety adaptation and remediation techniques during self care or transfers.   Instruct in proper use of assistive devices.    Learning Progress Summary           Patient Acceptance, E, VU,NR by JUDY at 11/18/2019  8:53 AM    Acceptance, E, VU,NR by MITUL at 11/15/2019  2:12 PM    Comment:  Pt educated on ADL retraining with toileting, grooming, and LBD. Pt educated on safety precautions and appropriate body mechanics.    Acceptance, E,D, VU,NR by AR at 11/14/2019  9:00 AM                   Point: Home exercise program (Done)     Description: Instruct learner(s) on appropriate technique for monitoring, assisting and/or progressing therapeutic exercises/activities.    Learning Progress Summary           Patient Acceptance, E,D, VU,NR by AR at 11/14/2019  9:00 AM                   Point: Precautions (Done)     Description: Instruct learner(s) on prescribed precautions during self-care and functional transfers.    Learning Progress Summary           Patient Acceptance, E, VU,NR by JUDY at 11/18/2019  8:53 AM    Acceptance, E, VU,NR by MITUL at 11/15/2019  2:12 PM    Comment:  Pt educated on ADL retraining with toileting, grooming, and LBD. Pt educated on safety precautions and appropriate body mechanics.    Acceptance, E,D, VU,NR by AR at 11/14/2019  9:00 AM                   Point: Body mechanics (Done)     Description: Instruct learner(s) on proper positioning and spine alignment during self-care, functional mobility activities and/or exercises.    Learning Progress Summary           Patient Acceptance, E, VU,NR by JUDY at 11/18/2019  8:53 AM    Acceptance, E, VU,NR by MITUL at 11/15/2019  2:12 PM    Comment:  Pt educated on ADL retraining with toileting, grooming, and LBD. Pt educated on safety precautions and appropriate body mechanics.    Acceptance, E,D, VU,NR  by AR at 11/14/2019  9:00 AM                               User Key     Initials Effective Dates Name Provider Type Discipline    AR 06/22/15 -  Heidi Encarnacion, OT Occupational Therapist OT    HK 03/07/18 -  Keila Le OT Occupational Therapist OT     07/18/19 -  Malena Gaston OT Occupational Therapist OT                OT Recommendation and Plan  Outcome Summary/Treatment Plan (OT)  Daily Summary of Progress (OT): progress toward functional goals as expected  Anticipated Discharge Disposition (OT): inpatient rehabilitation facility  Daily Summary of Progress (OT): progress toward functional goals as expected  Plan of Care Review  Plan of Care Reviewed With: patient  Plan of Care Reviewed With: patient  Outcome Summary: Pt. performed bed mobility with SUP. STS from bed with SBA and vc's needed for STS from toilet for safety. Pt. completed grooming tasks standing at sink with RWx. Good safety awareness and no LOB while standing. SUA and SUP during grooming tasks.    Outcome Measures     Row Name 11/18/19 0853 11/16/19 1529 11/15/19 1412       How much help from another person do you currently need...    Turning from your back to your side while in flat bed without using bedrails?  --  4  -SJ  --    Moving from lying on back to sitting on the side of a flat bed without bedrails?  --  4  -SJ  --    Moving to and from a bed to a chair (including a wheelchair)?  --  3  -SJ  --    Standing up from a chair using your arms (e.g., wheelchair, bedside chair)?  --  4  -SJ  --    Climbing 3-5 steps with a railing?  --  3  -SJ  --    To walk in hospital room?  --  3  -SJ  --    AM-PAC 6 Clicks Score (PT)  --  21  -SJ  --       How much help from another is currently needed...    Putting on and taking off regular lower body clothing?  3  -LC  --  3  -HK    Bathing (including washing, rinsing, and drying)  3  -LC  --  2  -HK    Toileting (which includes using toilet bed pan or urinal)  3  -LC  --  3  -HK    Putting on  and taking off regular upper body clothing  4  -LC  --  4  -HK    Taking care of personal grooming (such as brushing teeth)  3  -LC  --  3  -HK    Eating meals  4  -LC  --  4  -HK    AM-PAC 6 Clicks Score (OT)  20  -LC  --  19  -HK       Functional Assessment    Outcome Measure Options  --  AM-PAC 6 Clicks Basic Mobility (PT)  -  AM-PAC 6 Clicks Daily Activity (OT)  -      User Key  (r) = Recorded By, (t) = Taken By, (c) = Cosigned By    Initials Name Provider Type     Zina Brito, PT Physical Therapist    HK Keila Le, OT Occupational Therapist    Malena Flowers, OT Occupational Therapist           Time Calculation:   Time Calculation- OT     Row Name 11/18/19 0853             Time Calculation- OT    OT Start Time  0853  -      OT Received On  11/18/19  -      OT Goal Re-Cert Due Date  11/24/19  -         Timed Charges    32097 - OT Self Care/Mgmt Minutes  26  -        User Key  (r) = Recorded By, (t) = Taken By, (c) = Cosigned By    Initials Name Provider Type    Malena Flowers, OT Occupational Therapist        Therapy Charges for Today     Code Description Service Date Service Provider Modifiers Qty    [id number] HC OT SELF CARE/MGMT/TRAIN EA 15 MIN 11/18/2019 Malena Gaston, JONO GO 2               Malena Gaston OT  11/18/2019

## 2019-11-18 NOTE — PROGRESS NOTES
Continued Stay Note  [facility]     Patient Name: Aura Fischer  MRN: [id number]  Today's Date: 11/18/2019    Admit Date: 11/13/2019    Discharge Plan     Row Name 11/18/19 0847       Plan    Plan  [facility]    Patient/Family in Agreement with Plan  yes    Plan Comments  Left a message with Justin at [facility] about referral. CM will continue to follow.    Final Discharge Disposition Code  03 - skilled nursing facility (SNF)        Discharge Codes    No documentation.       Expected Discharge Date and Time     Expected Discharge Date Expected Discharge Time    Nov 18, 2019             Pascual Lucio RN

## 2019-11-18 NOTE — THERAPY TREATMENT NOTE
Patient Name: Aura Fischer  : 1940    MRN: [id number]                              Today's Date: 2019       Admit Date: 2019    Visit Dx:     ICD-10-CM ICD-9-CM   1. Acute metabolic encephalopathy G93.41 348.31   2. Cognitive communication deficit R41.841 799.52   3. Impaired mobility and ADLs Z74.09 799.89     Patient Active Problem List   Diagnosis   • Rectal bleeding   • Healthcare maintenance   • Anemia   • Anxiety disorder   • Gastroesophageal reflux disease without esophagitis   • Mixed hyperlipidemia   • Essential hypertension   • Acquired hypothyroidism   • Recurrent pancreatitis   • Depression   • Osteoporosis   • Acute metabolic encephalopathy   • Acute UTI (urinary tract infection)   • Sepsis, unspecified organism (CMS/HCC)   • Lactic acidosis     Past Medical History:   Diagnosis Date   • Anemia    • Diverticulosis    • Hyperlipidemia    • Hypertension    • Internal hemorrhoid    • Mood disorder (CMS/HCC)    • Thyroid disease      Past Surgical History:   Procedure Laterality Date   • BREAST SURGERY     • CHOLECYSTECTOMY     • HEMORRHOIDECTOMY     • HYSTERECTOMY     • PANCREATECTOMY     • SPLENECTOMY       General Information     Row Name 19 1401          PT Evaluation Time/Intention    Document Type  therapy note (daily note)  -AA     Mode of Treatment  physical therapy  -AA     Row Name 19 1401          General Information    Patient Profile Reviewed?  yes  -AA     Existing Precautions/Restrictions  fall;seizures  -AA     Row Name 19 1401          Cognitive Assessment/Intervention- PT/OT    Orientation Status (Cognition)  oriented x 4  -AA     Row Name 19 1401          Safety Issues, Functional Mobility    Impairments Affecting Function (Mobility)  endurance/activity tolerance;balance  -AA       User Key  (r) = Recorded By, (t) = Taken By, (c) = Cosigned By    Initials Name Provider Type    Iris Alvarez, PT Physical Therapist        Mobility     Row  Name 11/18/19 1402          Bed Mobility Assessment/Treatment    Comment (Bed Mobility)  pt UIC  -AA     Row Name 11/18/19 1402          Transfer Assessment/Treatment    Comment (Transfers)  good sequencing and safety during transfers  -AA     Row Name 11/18/19 1402          Sit-Stand Transfer    Sit-Stand Turner (Transfers)  supervision  -AA     Assistive Device (Sit-Stand Transfers)  walker, front-wheeled  -AA     Row Name 11/18/19 1402          Gait/Stairs Assessment/Training    Gait/Stairs Assessment/Training  gait/ambulation independence;distance ambulated  -AA     Turner Level (Gait)  stand by assist;verbal cues  -AA     Assistive Device (Gait)  walker, front-wheeled  -AA     Distance in Feet (Gait)  225  -AA     Pattern (Gait)  step-through  -AA     Deviations/Abnormal Patterns (Gait)  base of support, narrow;gait speed decreased  -AA     Bilateral Gait Deviations  forward flexed posture  -AA     Comment (Gait/Stairs)  Pt ambulated with RW and SBA with good sequencing during turns and no LOB.    -AA       User Key  (r) = Recorded By, (t) = Taken By, (c) = Cosigned By    Initials Name Provider Type    AA Iris León, PT Physical Therapist        Obj/Interventions     Row Name 11/18/19 1402          Static Sitting Balance    Level of Turner (Unsupported Sitting, Static Balance)  independent  -AA     Sitting Position (Unsupported Sitting, Static Balance)  sitting in chair  -AA     Time Able to Maintain Position (Unsupported Sitting, Static Balance)  more than 5 minutes  -AA     Row Name 11/18/19 1402          Static Standing Balance    Level of Turner (Supported Standing, Static Balance)  standby assist  -AA     Assistive Device Utilized (Supported Standing, Static Balance)  walker, rolling  -AA     Row Name 11/18/19 1402          Dynamic Standing Balance    Level of Turner, Reaches Outside Midline (Standing, Dynamic Balance)  standby assist  -AA     Assistive Device Utilized  (Supported Standing, Dynamic Balance)  walker, rolling  -AA       User Key  (r) = Recorded By, (t) = Taken By, (c) = Cosigned By    Initials Name Provider Type    Iris Alvarez PT Physical Therapist        Goals/Plan    No documentation.       Clinical Impression     Row Name 11/18/19 1402          Pain Assessment    Additional Documentation  Pain Scale: Numbers Pre/Post-Treatment (Group)  -AA     Row Name 11/18/19 1402          Pain Scale: Numbers Pre/Post-Treatment    Pain Scale: Numbers, Pretreatment  0/10 - no pain  -AA     Pain Scale: Numbers, Post-Treatment  0/10 - no pain  -AA     Row Name 11/18/19 1402          Plan of Care Review    Plan of Care Reviewed With  patient  -AA     Row Name 11/18/19 1402          Positioning and Restraints    Pre-Treatment Position  sitting in chair/recliner  -AA     Post Treatment Position  chair  -AA     In Chair  notified nsg;exit alarm on;reclined;with family/caregiver;call light within reach;encouraged to call for assist  -CHUCK       User Key  (r) = Recorded By, (t) = Taken By, (c) = Cosigned By    Initials Name Provider Type    Iris Alvarez, PT Physical Therapist        Outcome Measures     Row Name 11/18/19 1402          How much help from another person do you currently need...    Turning from your back to your side while in flat bed without using bedrails?  4  -AA     Moving from lying on back to sitting on the side of a flat bed without bedrails?  4  -AA     Moving to and from a bed to a chair (including a wheelchair)?  3  -AA     Standing up from a chair using your arms (e.g., wheelchair, bedside chair)?  4  -AA     Climbing 3-5 steps with a railing?  3  -AA     To walk in hospital room?  3  -AA     AM-PAC 6 Clicks Score (PT)  21  -AA     Row Name 11/18/19 1402          Functional Assessment    Outcome Measure Options  AM-PAC 6 Clicks Basic Mobility (PT)  -AA       User Key  (r) = Recorded By, (t) = Taken By, (c) = Cosigned By    Initials Name Provider Type     Iris Alvarez, PT Physical Therapist        Physical Therapy Education     Title: PT OT SLP Therapies (In Progress)     Topic: Physical Therapy (In Progress)     Point: Mobility training (In Progress)     Learning Progress Summary           Patient Acceptance, E, NR by  at 11/18/2019  2:02 PM    Acceptance, E, NR by  at 11/16/2019  3:59 PM    Eager, E, VU by SC at 11/15/2019  3:45 PM    Comment:  reviewed benefits of activity                   Point: Home exercise program (In Progress)     Learning Progress Summary           Patient Acceptance, E, NR by  at 11/16/2019  3:59 PM    Eager, E, VU by SC at 11/15/2019  3:45 PM    Comment:  reviewed benefits of activity                   Point: Body mechanics (In Progress)     Learning Progress Summary           Patient Acceptance, E, NR by  at 11/18/2019  2:02 PM    Acceptance, E, NR by  at 11/16/2019  3:59 PM    Eager, E, VU by SC at 11/15/2019  3:45 PM    Comment:  reviewed benefits of activity                   Point: Precautions (In Progress)     Learning Progress Summary           Patient Acceptance, E, NR by  at 11/18/2019  2:02 PM    Acceptance, E, NR by  at 11/16/2019  3:59 PM    Eager, E, VU by SC at 11/15/2019  3:45 PM    Comment:  reviewed benefits of activity                               User Key     Initials Effective Dates Name Provider Type Discipline    SC 06/19/15 -  Tammie Langley, PT Physical Therapist PT     06/19/15 -  Zina Brito PT Physical Therapist PT     04/02/18 -  Iris León, PT Physical Therapist PT              PT Recommendation and Plan     Plan of Care Reviewed With: patient  Progress: improving  Outcome Summary: Pt perform STS transfer training with SBA to RW and good sequencing.  Pt ambulated with RW and  feet with no LOB and cues for safety.       Time Calculation:   PT Charges     Row Name 11/18/19 1402             Time Calculation    Start Time  1402  -AA      PT Received On  11/18/19  -       PT Goal Re-Cert Due Date  11/25/19  -         Time Calculation- PT    Total Timed Code Minutes- PT  24 minute(s)  -AA         Timed Charges    92496 - Gait Training Minutes   15  -AA      75939 - PT Therapeutic Activity Minutes  9  -AA        User Key  (r) = Recorded By, (t) = Taken By, (c) = Cosigned By    Initials Name Provider Type    AA Iris León, PT Physical Therapist        Therapy Charges for Today     Code Description Service Date Service Provider Modifiers Qty    32066607662 HC GAIT TRAINING EA 15 MIN 11/18/2019 Iris León, PT GP 1    44149103243 HC PT THERAPEUTIC ACT EA 15 MIN 11/18/2019 Iris León, PT GP 1          PT G-Codes  Outcome Measure Options: AM-PAC 6 Clicks Basic Mobility (PT)  AM-PAC 6 Clicks Score (PT): 21  AM-PAC 6 Clicks Score (OT): 20    April LILLIANA León PT  11/18/2019

## 2019-11-18 NOTE — PLAN OF CARE
Problem: Patient Care Overview  Goal: Plan of Care Review  Outcome: Ongoing (interventions implemented as appropriate)   11/18/19 8047   Coping/Psychosocial   Plan of Care Reviewed With patient;family   SLP treatment completed. Will sign-off. Please see note for further details and recommendations.

## 2019-11-18 NOTE — PLAN OF CARE
Problem: Patient Care Overview  Goal: Plan of Care Review  Outcome: Ongoing (interventions implemented as appropriate)   11/18/19 0813   Coping/Psychosocial   Plan of Care Reviewed With patient   OTHER   Outcome Summary Pt VSS. Pt is up to bathroom with one assist. Gave Pt PRN hydralazine this AM for eleveated BP. Pt BP started to decrease towards a normal range following hydralazine. Pt only complained of a headache at the beginning of my shift. Treated with Tylenol. Continuing to monitor.   Plan of Care Review   Progress improving       Problem: Fall Risk (Adult)  Goal: Identify Related Risk Factors and Signs and Symptoms  Outcome: Ongoing (interventions implemented as appropriate)    Goal: Absence of Fall  Outcome: Ongoing (interventions implemented as appropriate)      Problem: Skin Injury Risk (Adult)  Goal: Identify Related Risk Factors and Signs and Symptoms  Outcome: Ongoing (interventions implemented as appropriate)    Goal: Skin Health and Integrity  Outcome: Ongoing (interventions implemented as appropriate)      Problem: Confusion, Acute (Adult)  Goal: Identify Related Risk Factors and Signs and Symptoms  Outcome: Ongoing (interventions implemented as appropriate)    Goal: Cognitive/Functional Impairments Minimized  Outcome: Ongoing (interventions implemented as appropriate)    Goal: Safety  Outcome: Ongoing (interventions implemented as appropriate)

## 2019-11-18 NOTE — PROGRESS NOTES
[facility] Medicine Services  PROGRESS NOTE    Patient Name: Aura Fischer  : 1940  MRN: [id number]    Date of Admission: 2019  Primary Care Physician: Jose Manuel Cabrales MD    Subjective   Subjective     CC: f/u AMS    HPI: Seen prior to MARGARITO. Up in chair. Doing very well. Feels back to normal. Throat pain resolved.    Review of Systems  Gen- No fevers, chills  CV- No chest pain, palpitations  Resp- No cough, dyspnea  GI- No N/V/D, abd pain      Objective   Objective     Vital Signs:   Temp:  [98 °F (36.7 °C)-99.7 °F (37.6 °C)] 98.8 °F (37.1 °C)  Heart Rate:  [71-92] 75  Resp:  [12-18] 14  BP: ()/() 99/67  Total (NIH Stroke Scale): 5     Physical Exam:  Constitutional: No acute distress, awake, alert  HENT: NCAT, mucous membranes moist  Respiratory: Clear to auscultation bilaterally, respiratory effort normal   Cardiovascular: RRR, no murmurs, rubs, or gallops, palpable pedal pulses bilaterally  Gastrointestinal: Positive bowel sounds, soft, nontender, nondistended  Musculoskeletal: No bilateral ankle edema  Psychiatric: Appropriate affect, cooperative  Neurologic: Oriented x 3, strength symmetric in all extremities, Cranial Nerves grossly intact to confrontation, speech clear  Skin: No rashes    Results Reviewed:    Results from last 7 days   Lab Units 11/15/19  0844 19  0524   WBC 10*3/mm3 13.21* 12.97*   HEMOGLOBIN g/dL 12.9 12.4   HEMATOCRIT % 39.1 38.3   PLATELETS 10*3/mm3 337 293     Results from last 7 days   Lab Units 11/15/19  2350 11/15/19  0844 19  0524   SODIUM mmol/L  --  137 137   POTASSIUM mmol/L 4.2 2.9* 3.7   CHLORIDE mmol/L  --  101 99   CO2 mmol/L  --  20.0* 22.0   BUN mg/dL  --  12 15   CREATININE mg/dL  --  0.74 0.79   GLUCOSE mg/dL  --  178* 184*   CALCIUM mg/dL  --  7.9* 8.7   ALT (SGPT) U/L  --   --  31   AST (SGOT) U/L  --   --  29   TROPONIN T ng/mL  --   --  <0.010     Estimated Creatinine Clearance: 52.5 mL/min (by C-G  formula based on SCr of 0.74 mg/dL).    Microbiology Results Abnormal     Procedure Component Value - Date/Time    Blood Culture - Blood, Hand, Right [[id number]] Collected:  11/13/19 0533    Lab Status:  Final result Specimen:  Blood from Hand, Right Updated:  11/18/19 0801     Blood Culture No growth at 5 days    Blood Culture - Blood, Arm, Right [[id number]] Collected:  11/13/19 0542    Lab Status:  Final result Specimen:  Blood from Arm, Right Updated:  11/18/19 0801     Blood Culture No growth at 5 days    Culture, CSF - Cerebrospinal Fluid, Lumbar Puncture [[id number]] Collected:  11/13/19 1420    Lab Status:  Final result Specimen:  Cerebrospinal Fluid from Lumbar Puncture Updated:  11/16/19 0712     CSF Culture No growth at 3 days     Gram Stain Few (2+) WBCs seen      No organisms seen    Urine Culture - Urine, Urine, Clean Catch [[id number]]  (Normal) Collected:  11/13/19 0810    Lab Status:  Final result Specimen:  Urine, Clean Catch Updated:  11/14/19 2142     Urine Culture No growth    Meningitis / Encephalitis Panel, PCR - Cerebrospinal Fluid, Lumbar Puncture [[id number]]  (Normal) Collected:  11/13/19 1420    Lab Status:  Final result Specimen:  Cerebrospinal Fluid from Lumbar Puncture Updated:  11/13/19 1628     ESCHERICHIA COLI K1, PCR Not Detected     HAEMOPHILUS INFLUENZAE, PCR Not Detected     LISTERIA MONOCYTOGENES, PCR Not Detected     NEISSERIA MENINGITIDIS, PCR Not Detected     STREPTOCOCCUS AGALACTIAE, PCR Not Detected     STREPTOCOCCUS PNEUMONIAE, PCR Not Detected     CYTOMEGALOVIRUS (CMV), PCR Not Detected     ENTEROVIRUS, PCR Not Detected     HERPES SIMPLEX VIRUS 1 (HSV-1), PCR Not Detected     HERPES SIMPLEX VIRUS 2 (HSV-2), PCR Not Detected     HUMAN PARECHOVIRUS, PCR Not Detected     VARICELLA ZOSTER VIRUS (VZV), PCR Not Detected     CRYPTOCOCCUS NEOFORMANS / GATTII, PCR Not Detected     HUMAN HERPES VIRUS 6 PCR Not Detected    Influenza A & B PCR - Swab, Nasopharynx [[id number]]  (Normal)  Collected:  11/13/19 0811    Lab Status:  Final result Specimen:  Swab from Nasopharynx Updated:  11/13/19 1023     Influenza A PCR Not Detected     Influenza B PCR Not Detected            Results for orders placed during the hospital encounter of 11/13/19   Adult Transthoracic Echo Complete W/ Cont if Necessary Per Protocol (With Agitated Saline)    Narrative · Normal cardiac chamber sizes and wall thicknesses.  · Global and segmental LV wall motion is normal with an estimated LV   ejection fraction >70%.  · There is mild aortic valve sclerosis. I cannot exclude mild aortic   insufficiency.  · There is mitral annular calcification and mild mitral regurgitation.  · The estimated pulmonary artery pressure is normal.  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 This has the appearance of a fibroelastoma although I   cannot exclude myxoma, organized thrombus,or leaflet calcification from   this study.  · Agitated saline study is negative for intracardiac shunt.          I have reviewed the medications:  Scheduled Meds:  amLODIPine 10 mg Oral Q24H   aspirin 81 mg Oral Daily   atorvastatin 80 mg Oral Nightly   carvedilol 12.5 mg Oral BID With Meals   cefTRIAXone 2 g Intravenous Q24H   famotidine 20 mg Oral BID AC   FLUoxetine 10 mg Oral Daily   insulin lispro 0-7 Units Subcutaneous 4x Daily With Meals & Nightly   levothyroxine 75 mcg Oral Daily   lidocaine 5 mL Subcutaneous Once   lisinopril 20 mg Oral Q12H   pantoprazole 40 mg Oral Q AM   saccharomyces boulardii 250 mg Oral Daily   sodium chloride 10 mL Intravenous Q12H     Continuous Infusions:  hold 1 each     PRN Meds:.•  hold  •  acetaminophen **OR** acetaminophen  •  dextrose  •  dextrose  •  glucagon (human recombinant)  •  hydrALAZINE  •  magnesium sulfate **OR** magnesium sulfate **OR** magnesium sulfate  •  ondansetron  •  ondansetron  •  potassium chloride **OR** potassium chloride **OR** potassium  chloride  •  promethazine **OR** promethazine **OR** promethazine  •  QUEtiapine  •  sodium chloride      Assessment/Plan   Assessment / Plan     Active Hospital Problems    Diagnosis  POA   • Acute metabolic encephalopathy [G93.41]  Yes   • Acute UTI (urinary tract infection) [N39.0]  Yes   • Sepsis, unspecified organism (CMS/HCC) [A41.9]  Yes   • Lactic acidosis [E87.2]  Yes   • Mixed hyperlipidemia [E78.2]  Yes   • Essential hypertension [I10]  Yes   • Acquired hypothyroidism [E03.9]  Yes      Resolved Hospital Problems   No resolved problems to display.        Brief Hospital Course to date:  Aura iFscher is a 79 y.o. female who was found down nearly 20 hours after last known well. She is found to have metabolic encephalopathy likely due to viral illness vs UTI. Her encephalopathy has now resolved. Was found to have abnormal TTE during evaluation and undergoing MARGARITO 11/18.     A/P:  --Her encephalopathy has resolved and was most likely due to acute viral illness and UTI. She is now back to baseline. Continue IV rocephin for now with plan for PO cephalosporin per ID note.  --Neurology has s/o. I have d/w her. Felt to be due to above, and now that resolved has s/o.   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 MARGARITO completed with interpretation pending.  --Reviewed BP for past 24 hours. Her HTN is much better controlled today. Continue current regimen.. On ace inhibitor BID at max. Added amlodipine, now at max. Started coreg.  --Esophageal burning improving. Continue GI cocktail prn along with pepcid, PPI for discomfort.  --CM following. Planning for rehab at d/c. She is medically ready for d/c pending results of MARGARITO.     DVT Prophylaxis:  Mechanical.     Disposition: I expect the patient to be discharged to rehab later today or early tomorrow pending MARGARITO.    CODE STATUS:   Code Status and Medical Interventions:   Ordered at: 11/13/19 0803     Level Of Support Discussed With:    Next of Kin (If No Surrogate)      Code Status:    No CPR     Medical Interventions (Level of Support Prior to Arrest):    Full         Electronically signed by Naheed Lowe II, DO, 11/18/19, 1:17 PM.

## 2019-11-18 NOTE — PLAN OF CARE
Problem: Patient Care Overview  Goal: Plan of Care Review  Outcome: Ongoing (interventions implemented as appropriate)   11/18/19 7460   Coping/Psychosocial   Plan of Care Reviewed With patient   OTHER   Outcome Summary Pt. performed bed mobility with SUP. STS from bed with SBA and vc's needed for STS from toilet for safety. Demonstrated ambulating to short household distances with SBA. Pt. completed grooming tasks standing at sink with RWx. Good safety awareness and no LOB while standing. SUA and SUP during grooming tasks. Continue to follow per OT POC.

## 2019-11-18 NOTE — PROGRESS NOTES
Continued Stay Note  [facility]     Patient Name: Aura Fischer  MRN: [id number]  Today's Date: 11/18/2019    Admit Date: 11/13/2019    Discharge Plan     Row Name 11/18/19 1526       Plan    Plan  [facility]    Patient/Family in Agreement with Plan  yes    Plan Comments  Spoke with Ashlyn at [facility] and they can accept the patient tomorrow. Spoke to Nicolas (son) and he will transport tomorrow morning around 9:30 or 10:00. Dr. Lowe notified. CM will continue to follow.    Final Discharge Disposition Code  03 - skilled nursing facility (SNF)    Row Name 11/18/19 1504       Plan    Plan  [facility]    Patient/Family in Agreement with Plan  yes    Plan Comments  Called Ashlyn at [facility] and left another message. CM let ashlyn know that the patient is ready for discharge. CM will continue to follow.    Final Discharge Disposition Code  03 - skilled nursing facility (SNF)    Row Name 11/18/19 1408       Plan    Plan  [facility]    Patient/Family in Agreement with Plan  yes    Plan Comments  Called Ashlyn at [facility]. Was on hold for 20 minutes. She never picked up. Unknown if they can accept the patient today. I have left 2 other messages on her voicemail. CM will continue to follow.    Final Discharge Disposition Code  03 - skilled nursing facility (SNF)        Discharge Codes    No documentation.       Expected Discharge Date and Time     Expected Discharge Date Expected Discharge Time    Nov 19, 2019             Pascual Lucio RN

## 2019-11-18 NOTE — THERAPY TREATMENT NOTE
Acute Care - Speech Language Pathology Treatment Note  [facility]     Patient Name: Aura Fischer  : 1940  MRN: [id number]  Today's Date: 2019         Admit Date: 2019    Visit Dx:      ICD-10-CM ICD-9-CM   1. Acute metabolic encephalopathy G93.41 348.31   2. Cognitive communication deficit R41.841 799.52   3. Impaired mobility and ADLs Z74.09 799.89     Patient Active Problem List   Diagnosis   • Rectal bleeding   • Healthcare maintenance   • Anemia   • Anxiety disorder   • Gastroesophageal reflux disease without esophagitis   • Mixed hyperlipidemia   • Essential hypertension   • Acquired hypothyroidism   • Recurrent pancreatitis   • Depression   • Osteoporosis   • Acute metabolic encephalopathy   • Acute UTI (urinary tract infection)   • Sepsis, unspecified organism (CMS/HCC)   • Lactic acidosis        Therapy Treatment  Rehabilitation Treatment Summary     Row Name 19 1445 19 0853          Treatment Time/Intention    Discipline  speech language pathologist  -MP  occupational therapist  -LC     Document Type  therapy note (daily note)  -MP  therapy note (daily note)  -LC     Subjective Information  no complaints  -MP  no complaints  -LC     Mode of Treatment  individual therapy;speech-language pathology  -MP  individual therapy;occupational therapy  -LC     Patient/Family Observations  Visitors present  -MP  Pt. in bed on arrival   -LC     Care Plan Review  care plan/treatment goals reviewed;patient/other agree to care plan  -MP  patient/other agree to care plan  -     Therapy Frequency (SLP SLC)  PRN;evaluation only  -MP  --     Patient Effort  good  -MP  good  -LC     Existing Precautions/Restrictions  --  fall;seizures  -LC     Treatment Considerations/Comments  --  Circle   -LC     Recorded by [MP] Prabhjot Shea, MS CCC-SLP 19 1503 [LC] Malena Gaston, JONO 19 1017     Row Name 19 0853             Cognitive Assessment/Intervention    Additional  Documentation  Cognitive Assessment/Intervention (Group)  -LC      Recorded by [LC] Malena Gaston OT 11/18/19 1017      Row Name 11/18/19 0853             Cognitive Assessment/Intervention- PT/OT    Affect/Mental Status (Cognitive)  WFL  -      Orientation Status (Cognition)  oriented x 4  -LC      Follows Commands (Cognition)  follows one step commands;over 90% accuracy;verbal cues/prompting required  -LC      Recorded by [LC] Malena Gaston OT 11/18/19 1017      Row Name 11/18/19 0853             Safety Issues, Functional Mobility    Safety Issues Affecting Function (Mobility)  insight into deficits/self awareness;safety precaution awareness;safety precautions follow-through/compliance  -LC      Impairments Affecting Function (Mobility)  endurance/activity tolerance;balance  -LC      Recorded by [LC] Malena Gaston OT 11/18/19 1017      Row Name 11/18/19 0853             Bed Mobility Assessment/Treatment    Bed Mobility Assessment/Treatment  supine-sit;scooting/bridging  -LC      Scooting/Bridging [place] (Bed Mobility)  independent  -LC      Supine-Sit [place] (Bed Mobility)  supervision  -LC      Assistive Device (Bed Mobility)  bed rails;head of bed elevated  -      Comment (Bed Mobility)  Good safety awareness to transition from supine to sit EOB.   -LC      Recorded by [LC] Malena Gaston OT 11/18/19 1017      Row Name 11/18/19 0853             Functional Mobility    Functional Mobility- Ind. Level  standby assist  -      Functional Mobility- Device  rolling walker  -      Functional Mobility-Distance (Feet)  20  -      Functional Mobility- Comment  Pt. ambulated from bed to bathroom and back to chair.   -LC      Recorded by [LC] Malena Gaston OT 11/18/19 1017      Row Name 11/18/19 0853             Transfer Assessment/Treatment    Transfer Assessment/Treatment  sit-stand transfer;stand-sit transfer;toilet transfer  -      Comment (Transfers)  Pt. demonstrated good safety with  STS, vc's for hand placement during toileting task,  -LC      Recorded by [LC] Malena Gaston, OT 11/18/19 1017      Row Name 11/18/19 0853             Sit-Stand Transfer    Sit-Stand [place] (Transfers)  stand by assist  -LC      Assistive Device (Sit-Stand Transfers)  walker, front-wheeled  -LC      Recorded by [LC] Malena Gaston, OT 11/18/19 1017      Row Name 11/18/19 0853             Stand-Sit Transfer    Stand-Sit [place] (Transfers)  stand by assist  -LC      Assistive Device (Stand-Sit Transfers)  walker, front-wheeled  -LC      Recorded by [LC] Malena Gaston, OT 11/18/19 1017      Row Name 11/18/19 0853             Toilet Transfer    Type (Toilet Transfer)  sit-stand;stand-sit  -LC      [place] Level (Toilet Transfer)  contact guard;verbal cues  -LC      Assistive Device (Toilet Transfer)  walker, front-wheeled;raised toilet seat;grab bars/safety frame  -LC      Recorded by [LC] Malena Gaston, OT 11/18/19 1017      Row Name 11/18/19 0853             ADL Assessment/Intervention    98493 - OT Self Care/Mgmt Minutes  26  -LC      BADL Assessment/Intervention  grooming;toileting  -LC      Recorded by [LC] Malena Gaston, OT 11/18/19 1017      Row Name 11/18/19 0853             Grooming Assessment/Training    [place] Level (Grooming)  hair care, combing/brushing;oral care regimen;wash face, hands;set up;supervision  -LC      Grooming Position  supported standing  -      Comment (Grooming)  Pt. stood at sink with with RWx to complete grooming tasks. Increased time needed for oral care. NO LOB noted.   -LC      Recorded by [LC] Malena Gaston, OT 11/18/19 1017      Row Name 11/18/19 0853             Toileting Assessment/Training    [place] Level (Toileting)  adjust/manage clothing;perform perineal hygiene;supervision;verbal cues  -LC      Toileting Position  unsupported sitting;supported standing  -      Comment (Toileting)  Pt. completed all steps of toileting with close SUP, VC's  for safety/hand placement when standing  -LC      Recorded by [LC] Malena Gaston, OT 11/18/19 1017      Row Name 11/18/19 0853             BADL Safety/Performance    Impairments, BADL Safety/Performance  balance;endurance/activity tolerance;strength  -LC      Cognitive Impairments, BADL Safety/Performance  insight into deficits/self awareness;safety precaution awareness;safety precaution follow-through  -LC      Recorded by [LC] Malena Gaston, OT 11/18/19 1017      Row Name 11/18/19 0853             Balance    Balance  static sitting balance;static standing balance;dynamic standing balance  -LC      Recorded by [LC] Malena Gaston, OT 11/18/19 1017      Row Name 11/18/19 0853             Static Sitting Balance    Level of [place] (Unsupported Sitting, Static Balance)  independent  -LC      Sitting Position (Unsupported Sitting, Static Balance)  sitting on edge of bed  -LC      Time Able to Maintain Position (Unsupported Sitting, Static Balance)  2 to 3 minutes  -LC      Recorded by [LC] Malena Gaston, OT 11/18/19 1017      Row Name 11/18/19 0853             Dynamic Sitting Balance    Level of [place], Reaches Outside Midline (Sitting, Dynamic Balance)  supervision  -LC      Sitting Position, Reaches Outside Midline (Sitting, Dynamic Balance)  other (see comments) Raised toilet   -LC      Comment, Reaches Outside Midline (Sitting, Dynamic Balance)  Completing unruly care   -LC      Recorded by [LC] Malena Gatson, OT 11/18/19 1017      Row Name 11/18/19 0853             Static Standing Balance    Level of [place] (Supported Standing, Static Balance)  standby assist  -LC      Time Able to Maintain Position (Supported Standing, Static Balance)  3 to 4 minutes  -LC      Assistive Device Utilized (Supported Standing, Static Balance)  walker, rolling  -LC      Recorded by [LC] Malena Gaston OT 11/18/19 1017      Row Name 11/18/19 0853             Dynamic Standing Balance    Level of [place], Reaches  Outside Midline (Standing, Dynamic Balance)  standby assist  -      Time Able to Maintain Position, Reaches Outside Midline (Standing, Dynamic Balance)  more than 5 minutes  -      Assistive Device Utilized (Supported Standing, Dynamic Balance)  walker, rolling  -      Comment, Reaches Outside Midline (Standing, Dynamic Balance)  Completing grooming tasks at sink   -      Recorded by [LC] Malena Gaston, OT 11/18/19 1017      Row Name 11/18/19 0853             Positioning and Restraints    Pre-Treatment Position  in bed  -      Post Treatment Position  chair  -      In Chair  reclined;call light within reach;encouraged to call for assist;exit alarm on;waffle cushion;legs elevated  -      Recorded by [LC] Malena Gaston, OT 11/18/19 1017      Row Name 11/18/19 1445 11/18/19 0853          Pain Assessment    Additional Documentation  Pain Scale: FACES Pre/Post-Treatment (Group)  -MP  Pain Scale: Numbers Pre/Post-Treatment (Group)  -     Recorded by [MP] Prabhjot Shea, MS CCC-SLP 11/18/19 1503 [LC] Malena Gaston, OT 11/18/19 1017     Row Name 11/18/19 0853             Pain Scale: Numbers Pre/Post-Treatment    Pain Scale: Numbers, Pretreatment  0/10 - no pain  -      Pain Scale: Numbers, Post-Treatment  0/10 - no pain  -      Recorded by [LC] Malena Gaston, OT 11/18/19 1017      Row Name 11/18/19 1445             Pain Scale: FACES Pre/Post-Treatment    Pain: FACES Scale, Pretreatment  0-->no hurt  -MP      Pain: FACES Scale, Post-Treatment  0-->no hurt  -MP      Recorded by [MP] Prabhjot Shea, MS CCC-SLP 11/18/19 1503      Row Name 11/18/19 0853             Plan of Care Review    Plan of Care Reviewed With  patient  -      Recorded by [LC] Malena Gaston OT 11/18/19 1017      Row Name 11/18/19 0853             Outcome Summary/Treatment Plan (OT)    Daily Summary of Progress (OT)  progress toward functional goals as expected  -      Anticipated Discharge Disposition (OT)  inpatient  rehabilitation facility  -      Recorded by [LC] Malena Gaston, OT 11/18/19 1017      Row Name 11/18/19 1445             Outcome Summary/Treatment Plan (SLP)    Daily Summary of Progress (SLP)  progress toward functional goals is good  -MP      Plan for Continued Treatment (SLP)  Cognitive-communication impairments resolved. Pt reports feeling back to baseline. SLP will sign off. Please re-consult if any further concerns.  -MP      Anticipated Dischage Disposition  unknown  -MP      Recorded by [MP] Prabhjot Shea, MS CCC-SLP 11/18/19 1503        User Key  (r) = Recorded By, (t) = Taken By, (c) = Cosigned By    Initials Name Effective Dates Discipline    MP Prabhjot Shea, MS CCC-SLP 06/19/19 -  SLP    LC Malena Gaston, JONO 07/18/19 -  OT          EDUCATION  The patient has been educated in the following areas:   Communication Impairment.    SLP Recommendation and Plan  Daily Summary of Progress (SLP): progress toward functional goals is good     Plan for Continued Treatment (SLP): Cognitive-communication impairments resolved. Pt reports feeling back to baseline. SLP will sign off. Please re-consult if any further concerns.  Anticipated Dischage Disposition: unknown             SLP GOALS     Row Name 11/18/19 1440             Comprehend Questions Goal 1 (SLP)    Improve Ability to Comprehend Questions Goal 1 (SLP)  complex yes/no questions;complex wh questions;80%;with minimal cues (75-90%)  -MP      Time Frame (Comprehend Questions Goal 1, SLP)  short term goal (STG);by discharge  -MP      Progress (Ability to Comprehend Questions Goal 1, SLP)  100%;independently (over 90% accuracy)  -MP      Progress/Outcomes (Comprehend Questions Goal 1, SLP)  goal met  -MP         Follow Directions Goal 2 (SLP)    Improve Ability to Follow Directions Goal 1 (SLP)  2 step commands;80%;with minimal cues (75-90%)  -MP      Time Frame (Follow Directions Goal 1, SLP)  short term goal (STG);by discharge  -MP      Progress  (Ability to Follow Directions Goal 1, SLP)  100%;independently (over 90% accuracy)  -MP      Progress/Outcomes (Follow Directions Goal 1, SLP)  goal met  -MP         Word Retrieval Skills Goal 1 (SLP)    Improve Word Retrieval Skills By Goal 1 (SLP)  complex;responsive naming task;completing a divergent task;completing a convergent task;with minimal cues (75-90%)  -MP      Time Frame (Word Retrieval Goal 1, SLP)  short term goal (STG);by discharge  -MP      Progress (Word Retrieval Skills Goal 1, SLP)  100%;independently (over 90% accuracy)  -MP      Progress/Outcomes (Word Retrieval Goal 1, SLP)  goal met  -MP         Additional Goal 1 (SLP)    Additional Goal 1, SLP  LTG: Pt will improve cognitive-communication skills to actively participate in care w/ 100% accuracy w/o cues  -MP      Time Frame (Additional Goal 1, SLP)  by discharge  -MP      Progress/Outcomes (Additional Goal 1, SLP)  goal met  -MP        User Key  (r) = Recorded By, (t) = Taken By, (c) = Cosigned By    Initials Name Provider Type    Prabhjot Krishnan MS CCC-SLP Speech and Language Pathologist              Time Calculation:     Time Calculation- SLP     Row Name 11/18/19 1506             Time Calculation- SLP    SLP Start Time  1440  -MP      SLP Received On  11/18/19  -MP        User Key  (r) = Recorded By, (t) = Taken By, (c) = Cosigned By    Initials Name Provider Type    Prabhjot Krishnan MS CCC-SLP Speech and Language Pathologist          Therapy Charges for Today     Code Description Service Date Service Provider Modifiers Qty    [id number] [facility] TREATMENT SPEECH 2 11/18/2019 Prabhjot Shea MS CCC-JAZMYNE GN 1                     MS JB Li  11/18/2019

## 2019-11-19 VITALS
SYSTOLIC BLOOD PRESSURE: 156 MMHG | BODY MASS INDEX: 23.28 KG/M2 | OXYGEN SATURATION: 95 % | DIASTOLIC BLOOD PRESSURE: 83 MMHG | WEIGHT: 126.5 LBS | RESPIRATION RATE: 16 BRPM | TEMPERATURE: 98.5 F | HEIGHT: 62 IN | HEART RATE: 79 BPM

## 2019-11-19 LAB — GLUCOSE BLDC GLUCOMTR-MCNC: 140 MG/DL (ref 70–130)

## 2019-11-19 PROCEDURE — 25010000002 CEFTRIAXONE PER 250 MG: Performed by: INTERNAL MEDICINE

## 2019-11-19 PROCEDURE — 82962 GLUCOSE BLOOD TEST: CPT

## 2019-11-19 PROCEDURE — 99239 HOSP IP/OBS DSCHRG MGMT >30: CPT | Performed by: INTERNAL MEDICINE

## 2019-11-19 PROCEDURE — 97110 THERAPEUTIC EXERCISES: CPT

## 2019-11-19 PROCEDURE — 97116 GAIT TRAINING THERAPY: CPT

## 2019-11-19 RX ADMIN — LISINOPRIL 20 MG: 20 TABLET ORAL at 08:12

## 2019-11-19 RX ADMIN — ASPIRIN 81 MG 81 MG: 81 TABLET ORAL at 08:12

## 2019-11-19 RX ADMIN — LEVOTHYROXINE SODIUM 75 MCG: 75 TABLET ORAL at 06:01

## 2019-11-19 RX ADMIN — AMLODIPINE BESYLATE 10 MG: 10 TABLET ORAL at 08:12

## 2019-11-19 RX ADMIN — FAMOTIDINE 20 MG: 20 TABLET ORAL at 06:02

## 2019-11-19 RX ADMIN — PANTOPRAZOLE SODIUM 40 MG: 40 TABLET, DELAYED RELEASE ORAL at 06:02

## 2019-11-19 RX ADMIN — CEFTRIAXONE 2 G: 2 INJECTION, POWDER, FOR SOLUTION INTRAMUSCULAR; INTRAVENOUS at 08:12

## 2019-11-19 RX ADMIN — FLUOXETINE 10 MG: 10 CAPSULE ORAL at 08:12

## 2019-11-19 RX ADMIN — CARVEDILOL 12.5 MG: 12.5 TABLET, FILM COATED ORAL at 08:12

## 2019-11-19 RX ADMIN — Medication 250 MG: at 08:12

## 2019-11-19 NOTE — PLAN OF CARE
Problem: Patient Care Overview  Goal: Plan of Care Review  Outcome: Ongoing (interventions implemented as appropriate)   11/19/19 0408   Coping/Psychosocial   Plan of Care Reviewed With patient   OTHER   Outcome Summary Pt VSS and AOx4. Pt has had uneventful shift. Pt has slept throughout the evening. BP has been WNL.   Plan of Care Review   Progress improving       Problem: Fall Risk (Adult)  Goal: Identify Related Risk Factors and Signs and Symptoms  Outcome: Ongoing (interventions implemented as appropriate)    Goal: Absence of Fall  Outcome: Ongoing (interventions implemented as appropriate)      Problem: Skin Injury Risk (Adult)  Goal: Identify Related Risk Factors and Signs and Symptoms  Outcome: Ongoing (interventions implemented as appropriate)    Goal: Skin Health and Integrity  Outcome: Ongoing (interventions implemented as appropriate)      Problem: Confusion, Acute (Adult)  Goal: Identify Related Risk Factors and Signs and Symptoms  Outcome: Ongoing (interventions implemented as appropriate)

## 2019-11-19 NOTE — PLAN OF CARE
Problem: Patient Care Overview  Goal: Plan of Care Review  Outcome: Ongoing (interventions implemented as appropriate)   11/18/19 1947   Coping/Psychosocial   Plan of Care Reviewed With patient   OTHER   Outcome Summary Patient alert and oriented. VSS. New increased dose of carvedilol has helped tremendously with her elevated blood pressures. Patient returned from [facility] with no incidents. Awaiting transfer to [place] tomorrow.   Plan of Care Review   Progress improving

## 2019-11-19 NOTE — PLAN OF CARE
Problem: Patient Care Overview  Goal: Plan of Care Review   11/19/19 1313   Coping/Psychosocial   Plan of Care Reviewed With patient   OTHER   Outcome Summary Pt either reached or almost reached all goals. Indep with bed mobility, able to ambulate up to 300 ft with SBA, some instability R LE no AD.

## 2019-11-19 NOTE — DISCHARGE PLACEMENT REQUEST
"Caitlin Veliz (79 y.o. Female)     Date of Birth Social Security Number Address Home Phone MRN    1940  [facility] [address] [phone] [id number]    Christianity Marital Status          Sabianism        Admission Date Admission Type Admitting Provider Attending Provider Department, Room/Bed    11/13/19 Urgent Naheed Lowe II, DO  [facility] 3G, S367/1    Discharge Date Discharge Disposition Discharge Destination        11/19/2019 Skilled Nursing Facility (DC - External)              Attending Provider:  (none)   Allergies:  Sulfa Antibiotics    Isolation:  None   Infection:  None   Code Status:  No CPR    Ht:  157.5 cm (62.01\")   Wt:  57.4 kg (126 lb 8 oz)    Admission Cmt:  None   Principal Problem:  None                Active Insurance as of 11/13/2019     Primary Coverage     Payor Plan Insurance Group Employer/Plan Group    MEDICARE MEDICARE A & B      Payor Plan Address Payor Plan Phone Number Payor Plan Fax Number Effective Dates    [address] [phone]  3/1/2006 - None Entered    [facility] [id number]       Subscriber Name Subscriber Birth Date Member ID       CAITLIN VELIZ 1940 [id number]           Secondary Coverage     Payor Plan Insurance Group Employer/Plan Group    [place]      Payor Plan Address Payor Plan Phone Number Payor Plan Fax Number Effective Dates    ATTN CLAIMS DEPT [phone]  1/1/2016 - None Entered    [address] [place] [id number]       Subscriber Name Subscriber Birth Date Member ID       CAITLIN VELIZ 1940 [id number]                 Emergency Contacts      (Rel.) Home Phone Work Phone Mobile Phone    Nicolas Veliz (Son) [phone] -- --    Gabriella Mijares (Sister) -- -- [phone]               Discharge Summary      Naheed Lowe II, DO at 11/18/19 1359              [facility] Medicine Services  DISCHARGE SUMMARY    Patient Name: " Aura Fischer  : 1940  MRN: [id number]    Date of Admission: 2019  1:29 AM  Date of Discharge:  2019  Primary Care Physician: Jose Manuel Cabrales MD    Consults     Date and Time Order Name Status Description    2019 0756 Inpatient Infectious Diseases Consult Completed     2019 0315 Inpatient Neurology Consult Stroke Completed           Hospital Course     Presenting Problem:   Encephalopathy [G93.40]  Encephalopathy [G93.40]    Active Hospital Problems    Diagnosis  POA   • Acute metabolic encephalopathy [G93.41]  Yes   • Acute UTI (urinary tract infection) [N39.0]  Yes   • Sepsis, unspecified organism (CMS/HCC) [A41.9]  Yes   • Lactic acidosis [E87.2]  Yes   • Mixed hyperlipidemia [E78.2]  Yes   • Essential hypertension [I10]  Yes   • Acquired hypothyroidism [E03.9]  Yes      Resolved Hospital Problems   No resolved problems to display.          Hospital Course:  Aura Fischer is a 79 y.o. female who was found down nearly 20 hours after last known well. She is found to have metabolic encephalopathy likely due to viral illness vs UTI.    Sepsis with metabolic encephalopathy due to UTI and concomitant viral illness: 78 y/o female admitted with above. Upon arrival she was placed on broad spectrum IV antibiotics and ID and neurology were consulted. She initially underwent neuroimaging with CT perfusion, CTA head and neck which were unremarkable. She also underwent LP which was relatively unremarkable with negative cultures and viral studies. Blood cultures and urine culture were both negative. Given appearance of UA though ID elected to continue treatment of presumed UTI with antibiotics. She will transition to oral ceftin upon d/c for 10 more days. Her mental status had returned to normal at time of d/c.     **Also had markedly uncontrolled HTN for the first part of her stay. Oral antihypertensives were adjusted with much improved at time of dc.    **During her eval for above she  was thought to have abnormal appearing MV on TTE. She underwent MARGARITO which ultimately was thought to be related to aortic insufficiency jet on the anterior leaflet.      Discharge Follow Up Recommendations for labs/diagnostics:   PCP in 1-2 weeks following d/c from SNF.    Day of Discharge     HPI: Up in chair. Had shower this am. Doing very well.     Review of Systems  Gen- No fevers, chills  CV- No chest pain, palpitations  Resp- No cough, dyspnea  GI- No N/V/D, abd pain    Otherwise ROS is negative except as mentioned in the HPI.    Vital Signs:   Temp:  [98 °F (36.7 °C)-99.7 °F (37.6 °C)] 98.8 °F (37.1 °C)  Heart Rate:  [71-92] 75  Resp:  [12-18] 14  BP: ()/() 99/67     Physical Exam:  Constitutional: No acute distress, awake, alert  HENT: NCAT, mucous membranes moist  Respiratory: Clear to auscultation bilaterally, respiratory effort normal   Cardiovascular: RRR, no murmurs, rubs, or gallops, palpable pedal pulses bilaterally  Gastrointestinal: Positive bowel sounds, soft, nontender, nondistended  Musculoskeletal: No bilateral ankle edema  Psychiatric: Appropriate affect, cooperative  Neurologic: Oriented x 3, strength symmetric in all extremities, Cranial Nerves grossly intact to confrontation, speech clear  Skin: No rashes    Pertinent  and/or Most Recent Results     Results from last 7 days   Lab Units 11/15/19  2350 11/15/19  0844 11/13/19  0524   WBC 10*3/mm3  --  13.21* 12.97*   HEMOGLOBIN g/dL  --  12.9 12.4   HEMATOCRIT %  --  39.1 38.3   PLATELETS 10*3/mm3  --  337 293   SODIUM mmol/L  --  137 137   POTASSIUM mmol/L 4.2 2.9* 3.7   CHLORIDE mmol/L  --  101 99   CO2 mmol/L  --  20.0* 22.0   BUN mg/dL  --  12 15   CREATININE mg/dL  --  0.74 0.79   GLUCOSE mg/dL  --  178* 184*   CALCIUM mg/dL  --  7.9* 8.7     Results from last 7 days   Lab Units 11/13/19  0524   BILIRUBIN mg/dL 0.7   ALK PHOS U/L 106   ALT (SGPT) U/L 31   AST (SGOT) U/L 29     Results from last 7 days   Lab Units 11/13/19  6693    CHOLESTEROL mg/dL 139   TRIGLYCERIDES mg/dL 72   HDL CHOL mg/dL 58     Results from last 7 days   Lab Units 11/13/19  0524   TSH uIU/mL 0.508   HEMOGLOBIN A1C % 5.90*   TROPONIN T ng/mL <0.010   LACTATE mmol/L 1.7       Brief Urine Lab Results  (Last result in the past 365 days)      Color   Clarity   Blood   Leuk Est   Nitrite   Protein   CREAT   Urine HCG        11/13/19 0810 Straw Slightly Cloudy Moderate (2+) Small (1+) Negative 30 mg/dL (1+)               Microbiology Results Abnormal     Procedure Component Value - Date/Time    Blood Culture - Blood, Hand, Right [[id number]] Collected:  11/13/19 0533    Lab Status:  Final result Specimen:  Blood from Hand, Right Updated:  11/18/19 0801     Blood Culture No growth at 5 days    Blood Culture - Blood, Arm, Right [[id number]] Collected:  11/13/19 0542    Lab Status:  Final result Specimen:  Blood from Arm, Right Updated:  11/18/19 0801     Blood Culture No growth at 5 days    Culture, CSF - Cerebrospinal Fluid, Lumbar Puncture [[id number]] Collected:  11/13/19 1420    Lab Status:  Final result Specimen:  Cerebrospinal Fluid from Lumbar Puncture Updated:  11/16/19 0712     CSF Culture No growth at 3 days     Gram Stain Few (2+) WBCs seen      No organisms seen    Urine Culture - Urine, Urine, Clean Catch [[id number]]  (Normal) Collected:  11/13/19 0810    Lab Status:  Final result Specimen:  Urine, Clean Catch Updated:  11/14/19 2142     Urine Culture No growth    Meningitis / Encephalitis Panel, PCR - Cerebrospinal Fluid, Lumbar Puncture [[id number]]  (Normal) Collected:  11/13/19 1420    Lab Status:  Final result Specimen:  Cerebrospinal Fluid from Lumbar Puncture Updated:  11/13/19 1628     ESCHERICHIA COLI K1, PCR Not Detected     HAEMOPHILUS INFLUENZAE, PCR Not Detected     LISTERIA MONOCYTOGENES, PCR Not Detected     NEISSERIA MENINGITIDIS, PCR Not Detected     STREPTOCOCCUS AGALACTIAE, PCR Not Detected     STREPTOCOCCUS PNEUMONIAE, PCR Not Detected      CYTOMEGALOVIRUS (CMV), PCR Not Detected     ENTEROVIRUS, PCR Not Detected     HERPES SIMPLEX VIRUS 1 (HSV-1), PCR Not Detected     HERPES SIMPLEX VIRUS 2 (HSV-2), PCR Not Detected     HUMAN PARECHOVIRUS, PCR Not Detected     VARICELLA ZOSTER VIRUS (VZV), PCR Not Detected     CRYPTOCOCCUS NEOFORMANS / GATTII, PCR Not Detected     HUMAN HERPES VIRUS 6 PCR Not Detected    Influenza A & B PCR - Swab, Nasopharynx [[id number]]  (Normal) Collected:  11/13/19 0811    Lab Status:  Final result Specimen:  Swab from Nasopharynx Updated:  11/13/19 1023     Influenza A PCR Not Detected     Influenza B PCR Not Detected          Imaging Results (All)     Procedure Component Value Units Date/Time     Renal Limited [[id number]] Collected:  11/16/19 0827     Updated:  11/17/19 0921    Narrative:       EXAMINATION: US RENAL LIMITED - 11/15/2019     INDICATION: G93.41-Metabolic encephalopathy; R41.841-Cognitive  communication deficit; Z74.09-Other reduced mobility. Urinary tract  infection.     TECHNIQUE: Sonographic imaging is obtained of the kidneys in both the  sagittal and transverse planes.     COMPARISON: None     FINDINGS: Right kidney measures in length from pole to pole 10.4 cm.  There is no solid cortical mass or renal cortical cysts seen within the  right kidney. No hydronephrosis or nephrolithiasis.     The left kidney measures in length from pole to pole 10.1 cm. No solid  cortical mass or renal cortical cysts seen within the left kidney. No  hydronephrosis or nephrolithiasis. Bladder is grossly unremarkable in  appearance. No underlying mass or lesion.     Incidental cystic structure seen in the right upper quadrant previously  noted to be within the liver. The approximate measurement is 10.2 cm.       Impression:       Unremarkable bilateral renal ultrasound.      DICTATED:   11/16/2019  EDITED/ls :   11/16/2019      This report was finalized on 11/17/2019 9:18 AM by Dr. Sherine Aragon MD.       IR Lumbar Puncture  Diag/Thera [[id number]] Collected:  11/13/19 1434     Updated:  11/13/19 1649    Narrative:       EXAMINATION: IR LUMBAR PUNCTURE DIAG/THERA-     INDICATION: fever, confusion, rule out meningitis     TECHNIQUE: 4 seconds of fluoroscopic time was used for this procedure. 1  associated image was saved. Informed consent was obtained from the  patient's son. The patient was placed in the prone position on the  table. The back was prepped and draped in a sterile fashion. 1%  lidocaine was used as a local anesthetic to the skin and subcutaneous  tissues. Under fluoroscopic guidance a 22-gauge spinal needle was  advanced at the L2-L3 level to the CSF space. Approximately 6 cc of  clear and colorless CSF was returned and collected in 4 numbered vials.  Sample vials were labeled and sent to pathology for further analysis.  The needle was removed.     COMPARISON: NONE     FINDINGS: The patient tolerated the procedure well, and no immediate  complications occurred.          Impression:       Successful fluoroscopic guided lumbar puncture as above with  no immediate complications.         This report was finalized on 11/13/2019 4:46 PM by Dr. Angus Correa MD.       CT Angiogram Neck [[id number]] Collected:  11/13/19 0728     Updated:  11/13/19 0858    Narrative:       CTA Neck, CTA Head    INDICATION:    79-year-old female transferred from outside facility for confusion. Last known well on 11/11/2019. Garbled speech. Fever that began today. Dizziness. Prior MRI brain earlier this morning at 0448 hours negative for acute ischemia.    Head and neck CTA:    Neck - Patent cervical CCA/ICA/vertebral arteries. No apparent dissection.   Head - No hemodynamically critical stenosis or vessel cut off. No apparent aneurysm. No dural sinus occlusion. There is heavy atherosclerotic change of the carotid bifurcations bilaterally left greater than right with narrowing of the proximal internal  carotid artery on the left. Estimated stenosis of  at least 50-70%. There is atherosclerotic change of the cavernous and supraclinoid internal carotid arteries left greater than right.    This is a preliminary wet read by Dr. Rodger Muniz at 0718 hours. Final interpretation will be provided by neuroradiology. Please refer to final interpretation for detailed findings and any further recommendations.      CTA Neck, CTA Head final interpretation    INDICATION:   As above    TECHNIQUE:   CT angiogram of the head and neck with nonionic IV contrast. Dose recorded in the patient's chart 3-D reconstructions were obtained and reviewed.  Evaluation for a significant carotid arterial stenosis is based on the NASCET criteria. Radiation dose  reduction techniques included automated exposure control or exposure modulation based on body size. Count of known CT and cardiac nuc med studies performed in previous 12 months: 0.     COMPARISON:   CT perfusion acquired at same time    FINDINGS:   CTA neck:  There are mild atherosclerotic vascular calcifications of the arch. The branch pattern is normal.    There is atherosclerotic plaque at the right carotid bifurcation which is largely calcified involving the distal common proximal internal and external vessels. By NASCET criteria essentially 0% diameter stenosis of the right carotid bifurcation. There is  mild disease in the right carotid siphon with likely mild stenosis.    Left carotid system shows largely calcified plaque at the distal common and proximal internal and external vessels. By NASCET criteria there is about 30% diameter stenosis. There is partially calcified plaque in the left carotid siphon with approximately  moderate stenosis.    Both vertebral arteries are patent. They are codominant.    CTA head:  There is an anterior communicating artery present. There is a tiny left posterior communicator with a approximately 2 mm aneurysm at its origin. It does not meet configuration criteria for an infundibulum. The right A1  vessel is mildly  hypoplastic. No significant sized right posterior communicator is seen. No intracranial vascular cut off. No focal central stenosis allowing for hypoplasia of the right A1 vessel. The dural venous sinuses are patent.      Impression:         1. Densely calcified plaque at the bilateral carotid bifurcations. By NASCET criteria there is 0% diameter stenosis on the right and approximately 30% diameter stenosis on the left. (Degree of estimated stenosis on the left is a change from the  preliminary report; please note the degree of stenosis is difficult to accurately estimate given the density of calcified plaque.)  2. Both vertebral arteries are patent and codominant.  3. No intracranial vascular cutoff or focal central stenosis allowing for hypoplastic right A1 vessel.  4. 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    Signer Name: Avelina Freitas MD   Signed: 11/13/2019 8:55 AM   Workstation Name: [id number]    [facility]    CT Angiogram Head [[id number]] Collected:  11/13/19 0728     Updated:  11/13/19 0858    Narrative:       CTA Neck, CTA Head    INDICATION:    79-year-old female transferred from outside facility for confusion. Last known well on 11/11/2019. Garbled speech. Fever that began today. Dizziness. Prior MRI brain earlier this morning at 0448 hours negative for acute ischemia.    Head and neck CTA:    Neck - Patent cervical CCA/ICA/vertebral arteries. No apparent dissection.   Head - No hemodynamically critical stenosis or vessel cut off. No apparent aneurysm. No dural sinus occlusion. There is heavy atherosclerotic change of the carotid bifurcations bilaterally left greater than right with narrowing of the proximal internal  carotid artery on the left. Estimated stenosis of at least 50-70%. There is atherosclerotic change of the cavernous and supraclinoid internal carotid arteries left greater than right.    This is a preliminary wet read by  Dr. Rodger Muniz at 0718 hours. Final interpretation will be provided by neuroradiology. Please refer to final interpretation for detailed findings and any further recommendations.      CTA Neck, CTA Head final interpretation    INDICATION:   As above    TECHNIQUE:   CT angiogram of the head and neck with nonionic IV contrast. Dose recorded in the patient's chart 3-D reconstructions were obtained and reviewed.  Evaluation for a significant carotid arterial stenosis is based on the NASCET criteria. Radiation dose  reduction techniques included automated exposure control or exposure modulation based on body size. Count of known CT and cardiac nuc med studies performed in previous 12 months: 0.     COMPARISON:   CT perfusion acquired at same time    FINDINGS:   CTA neck:  There are mild atherosclerotic vascular calcifications of the arch. The branch pattern is normal.    There is atherosclerotic plaque at the right carotid bifurcation which is largely calcified involving the distal common proximal internal and external vessels. By NASCET criteria essentially 0% diameter stenosis of the right carotid bifurcation. There is  mild disease in the right carotid siphon with likely mild stenosis.    Left carotid system shows largely calcified plaque at the distal common and proximal internal and external vessels. By NASCET criteria there is about 30% diameter stenosis. There is partially calcified plaque in the left carotid siphon with approximately  moderate stenosis.    Both vertebral arteries are patent. They are codominant.    CTA head:  There is an anterior communicating artery present. There is a tiny left posterior communicator with a approximately 2 mm aneurysm at its origin. It does not meet configuration criteria for an infundibulum. The right A1 vessel is mildly  hypoplastic. No significant sized right posterior communicator is seen. No intracranial vascular cut off. No focal central stenosis allowing for hypoplasia  of the right A1 vessel. The dural venous sinuses are patent.      Impression:         1. Densely calcified plaque at the bilateral carotid bifurcations. By NASCET criteria there is 0% diameter stenosis on the right and approximately 30% diameter stenosis on the left. (Degree of estimated stenosis on the left is a change from the  preliminary report; please note the degree of stenosis is difficult to accurately estimate given the density of calcified plaque.)  2. Both vertebral arteries are patent and codominant.  3. No intracranial vascular cutoff or focal central stenosis allowing for hypoplastic right A1 vessel.  4. 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    Signer Name: Avelina Freitas MD   Signed: 11/13/2019 8:55 AM   Workstation Name: [id number]    [facility]    CT Cerebral Perfusion With & Without Contrast [[id number]] Collected:  11/13/19 0716     Updated:  11/13/19 0719    Narrative:       CT CEREBRAL PERFUSION, WITH AND WITHOUT CONTRAST, 11/13/2019    HISTORY:   Confusion and delirium. Altered level of consciousness. Transferred from outside facility. Fever that began today.    TECHNIQUE:   Axial CT images of the brain without and with intravenous contrast using cerebral perfusion protocol. Post-processing parametric maps were created using RAPID software and reviewed. Radiation dose reduction techniques included automated exposure control  or exposure modulation based on body size. CT and nuclear cardiology exams in the last 12 months: 0.    COMPARISON:   Correlation with MRI of brain same day at 0448 hours.    FINDINGS:   Arterial input function is optimal.     Perfusion maps are symmetric without evidence of cerebral ischemia or core infarction.      Impression:       No focal area of acute ischemia identified.      Signer Name: Rodger Muniz MD   Signed: 11/13/2019 7:16 AM   Workstation Name: [id number]    [facility]    MRI Brain  Without Contrast [[id number]] Collected:  11/13/19 0502     Updated:  11/13/19 0505    Narrative:       MRI BRAIN, INCOMPLETE PARTIAL EXAM, 11/13/2019      HISTORY:    79-year-old female found unresponsive. Mental status change/confusion. MR imaging of the brain or was requested. No CT imaging has been performed here. The patient is incapable of remaining motionless during the study and could not remain in the scanner  for more than several sequences.    TECHNIQUE:  Only diffusion-weighted imaging was successfully performed before the exam was terminated. The sequences are degraded by substantial patient motion artifact.    FINDINGS:  There is no gross evidence of acute ischemia or other restricted diffusion.    On the more T2-weighted sequences, generalized cerebral volume loss and diffuse patchy white matter T2 hyperintensity is noted, nonspecific but most typically associated with chronic small vessel disease. No visible large mass, extra-axial fluid  collection or hydrocephalus.      Impression:       1. Incomplete study limited to only diffusion-weighted imaging.  2. No gross evidence of acute ischemia or other restricted diffusion.  3. Recommend CT imaging if not already performed.    Signer Name: Pascual Sandy MD   Signed: 11/13/2019 5:02 AM   Workstation Name: [facility]    [facility]    XR Chest 1 View [[id number]] Collected:  11/13/19 0411     Updated:  11/13/19 0413    Narrative:       CHEST X-RAY, 11/13/2019      HISTORY:    79-year-old female hospital inpatient with sepsis.      TECHNIQUE:  AP portable chest x-ray.      FINDINGS:  Moderate cardiomegaly. Pulmonary vascularity is normal. The lungs appear clear. No visible pulmonary infiltrate or pleural effusion.      Impression:       No active disease. Cardiomegaly.    Signer Name: Pascual Sandy MD   Signed: 11/13/2019 4:11 AM   Workstation Name: [facility]    [facility]                     Results for orders placed during the hospital encounter of 11/13/19   Adult Transesophageal Echo (MARGARITO) W/ Cont if Necessary Per Protocol    Narrative · The anterior leaflet of the mitral valve is thickened and exhibits only   mild regurgitation. There are no mobile densities associated with this to   suggest fibroblastoma or myxoma. Suspect this thickening may be related to   aortic insufficiency jet on the anterior leaflet..            Discharge Details        Discharge Medications      New Medications      Instructions Start Date   amLODIPine 10 MG tablet  Commonly known as:  NORVASC   10 mg, Oral, Every 24 Hours Scheduled   Start Date:  11/19/2019     aspirin 81 MG chewable tablet   81 mg, Oral, Daily   Start Date:  11/19/2019     carvedilol 12.5 MG tablet  Commonly known as:  COREG   12.5 mg, Oral, 2 Times Daily With Meals      cefuroxime 250 MG tablet  Commonly known as:  CEFTIN   250 mg, Oral, 2 Times Daily      famotidine 20 MG tablet  Commonly known as:  PEPCID   20 mg, Oral, 2 Times Daily Before Meals      lisinopril 40 MG tablet  Commonly known as:  PRINIVIL,ZESTRIL   40 mg, Oral, Daily      pantoprazole 40 MG EC tablet  Commonly known as:  PROTONIX   40 mg, Oral, Daily      saccharomyces boulardii 250 MG capsule  Commonly known as:  FLORASTOR   250 mg, Oral, Daily   Start Date:  11/19/2019        Changes to Medications      Instructions Start Date   atorvastatin 80 MG tablet  Commonly known as:  LIPITOR  What changed:    · medication strength  · how much to take  · when to take this   80 mg, Oral, Nightly         Continue These Medications      Instructions Start Date   alendronate 70 MG tablet  Commonly known as:  FOSAMAX   1 tablet, Oral, Weekly      FLUoxetine 10 MG capsule  Commonly known as:  PROzac   1 capsule, Oral, Daily      levothyroxine 75 MCG tablet  Commonly known as:  SYNTHROID, LEVOTHROID   TAKE 1 TABLET EVERY DAY (NEED MD APPOINTMENT)      promethazine 25 MG tablet  Commonly known as:   PHENERGAN   Oral      RA VITAMIN D-3 125 MCG (5000 UT) capsule  Generic drug:  vitamin d   1 tablet, Oral, Daily         Stop These Medications    trandolapril 2 MG tablet  Commonly known as:  MAVIK            Allergies   Allergen Reactions   • Sulfa Antibiotics          Discharge Disposition: SNF      Diet: Cardiac       CODE STATUS:    Code Status and Medical Interventions:   Ordered at: 11/13/19 0803     Level Of Support Discussed With:    Next of Kin (If No Surrogate)     Code Status:    No CPR     Medical Interventions (Level of Support Prior to Arrest):    Full       No future appointments.        Time Spent on Discharge:  42 minutes    Electronically signed by Naheed Lowe II, DO, 11/18/19, 2:00 PM.      Electronically signed by Naheed Lowe II, DO at 11/19/19 0912

## 2019-11-19 NOTE — THERAPY DISCHARGE NOTE
Patient Name: Aura Fischer  : 1940    MRN: [id number]                              Today's Date: 2019       Admit Date: 2019    Visit Dx:     ICD-10-CM ICD-9-CM   1. Acute metabolic encephalopathy G93.41 348.31   2. Cognitive communication deficit R41.841 799.52   3. Impaired mobility and ADLs Z74.09 799.89     Patient Active Problem List   Diagnosis   • Rectal bleeding   • Healthcare maintenance   • Anemia   • Anxiety disorder   • Gastroesophageal reflux disease without esophagitis   • Mixed hyperlipidemia   • Essential hypertension   • Acquired hypothyroidism   • Recurrent pancreatitis   • Depression   • Osteoporosis   • Acute metabolic encephalopathy   • Acute UTI (urinary tract infection)   • Sepsis, unspecified organism (CMS/HCC)   • Lactic acidosis     Past Medical History:   Diagnosis Date   • Anemia    • Diverticulosis    • Hyperlipidemia    • Hypertension    • Internal hemorrhoid    • Mood disorder (CMS/HCC)    • Thyroid disease      Past Surgical History:   Procedure Laterality Date   • BREAST SURGERY     • CHOLECYSTECTOMY     • HEMORRHOIDECTOMY     • HYSTERECTOMY     • PANCREATECTOMY     • SPLENECTOMY       General Information     Row Name 19 1307          PT Evaluation Time/Intention    Document Type  discharge treatment  -KG     Mode of Treatment  physical therapy  -KG     Row Name 19 1307          General Information    Patient Profile Reviewed?  yes  -KG     Existing Precautions/Restrictions  fall;seizures  -KG     Row Name 19 1307          Cognitive Assessment/Intervention- PT/OT    Orientation Status (Cognition)  oriented x 4  -KG     Row Name 19 1307          Safety Issues, Functional Mobility    Impairments Affecting Function (Mobility)  endurance/activity tolerance;balance  -KG       User Key  (r) = Recorded By, (t) = Taken By, (c) = Cosigned By    Initials Name Provider Type    KG Sherine Christianson Physical Therapist        Mobility     Row Name  11/19/19 1307          Bed Mobility Assessment/Treatment    Bed Mobility Assessment/Treatment  supine-sit;scooting/bridging  -KG     Scooting/Bridging [place] (Bed Mobility)  independent  -KG     Supine-Sit [place] (Bed Mobility)  supervision  -KG     Sit-Supine [place] (Bed Mobility)  supervision  -KG     Assistive Device (Bed Mobility)  bed rails;head of bed elevated  -KG     Row Name 11/19/19 1307          Sit-Stand Transfer    Sit-Stand [place] (Transfers)  supervision  -KG     Assistive Device (Sit-Stand Transfers)  walker, front-wheeled  -KG     Row Name 11/19/19 1307          Gait/Stairs Assessment/Training    Gait/Stairs Assessment/Training  gait/ambulation independence;distance ambulated  -KG     [place] Level (Gait)  stand by assist;verbal cues  -KG     Assistive Device (Gait)  walker, front-wheeled  -KG     Distance in Feet (Gait)  300  -KG     Pattern (Gait)  step-through  -KG     Deviations/Abnormal Patterns (Gait)  base of support, narrow;gait speed decreased  -KG     Bilateral Gait Deviations  forward flexed posture  -KG     Comment (Gait/Stairs)  Pt ambulated 300 ft, no AD, SBA, some instability in right LE, limited by fatigue  -KG       User Key  (r) = Recorded By, (t) = Taken By, (c) = Cosigned By    Initials Name Provider Type    KG Sherine Christianson Physical Therapist        Obj/Interventions     Row Name 11/19/19 1308          Therapeutic Exercise    Lower Extremity Range of Motion (Therapeutic Exercise)  -- heel-toe walking with railing assist, standing hip abduction.    -KG     Exercise Type (Therapeutic Exercise)  AROM (active range of motion)  -KG     Position (Therapeutic Exercise)  standing  -KG     Sets/Reps (Therapeutic Exercise)  x15  -KG     Expected Outcome (Therapeutic Exercise)  improve functional stability  -KG     Row Name 11/19/19 1308          Static Sitting Balance    Level of [place] (Unsupported Sitting, Static Balance)  independent  -KG      Sitting Position (Unsupported Sitting, Static Balance)  sitting in chair  -KG     Time Able to Maintain Position (Unsupported Sitting, Static Balance)  more than 5 minutes  -KG     Row Name 11/19/19 1308          Dynamic Sitting Balance    Level of [place], Reaches Outside Midline (Sitting, Dynamic Balance)  supervision  -KG     Comment, Reaches Outside Midline (Sitting, Dynamic Balance)  sitting on toilet and wiping  -KG       User Key  (r) = Recorded By, (t) = Taken By, (c) = Cosigned By    Initials Name Provider Type    Sherine Dudley Physical Therapist        Goals/Plan     Row Name 11/19/19 1312          Bed Mobility Goal 1 (PT)    Activity/Assistive Device (Bed Mobility Goal 1, PT)  bed mobility activities, all  -KG     [place] Level/Cues Needed (Bed Mobility Goal 1, PT)  independent  -KG     Time Frame (Bed Mobility Goal 1, PT)  long term goal (LTG);10 days  -KG     Progress/Outcomes (Bed Mobility Goal 1, PT)  goal met  -KG     Row Name 11/19/19 1312          Transfer Goal 1 (PT)    Activity/Assistive Device (Transfer Goal 1, PT)  transfers, all;walker, rolling  -KG     [place] Level/Cues Needed (Transfer Goal 1, PT)  conditional independence  -KG     Time Frame (Transfer Goal 1, PT)  long term goal (LTG);10 days  -KG     Progress/Outcome (Transfer Goal 1, PT)  good progress toward goal;goal partially met  -KG     Row Name 11/19/19 1312          Gait Training Goal 1 (PT)    Activity/Assistive Device (Gait Training Goal 1, PT)  gait (walking locomotion);walker, rolling  -KG     [place] Level (Gait Training Goal 1, PT)  conditional independence  -KG     Time Frame (Gait Training Goal 1, PT)  long term goal (LTG);10 days  -KG     Progress/Outcome (Gait Training Goal 1, PT)  goal partially met;good progress toward goal  -KG       User Key  (r) = Recorded By, (t) = Taken By, (c) = Cosigned By    Initials Name Provider Type    Sherine Dudley Physical Therapist        Clinical  Impression     Row Name 11/19/19 1311          Pain Assessment    Additional Documentation  Pain Scale: Numbers Pre/Post-Treatment (Group)  -KG     Row Name 11/19/19 1311          Pain Scale: Numbers Pre/Post-Treatment    Pain Scale: Numbers, Pretreatment  0/10 - no pain  -KG     Pain Scale: Numbers, Post-Treatment  0/10 - no pain  -KG     Row Name 11/19/19 1311          Positioning and Restraints    Pre-Treatment Position  bedside commode  -KG     Post Treatment Position  chair  -KG     In Chair  notified nsg;call light within reach;encouraged to call for assist;exit alarm on;legs elevated  -KG       User Key  (r) = Recorded By, (t) = Taken By, (c) = Cosigned By    Initials Name Provider Type    Sherine Dudley Physical Therapist        Outcome Measures     Row Name 11/19/19 1312          How much help from another person do you currently need...    Turning from your back to your side while in flat bed without using bedrails?  4  -KG     Moving from lying on back to sitting on the side of a flat bed without bedrails?  4  -KG     Moving to and from a bed to a chair (including a wheelchair)?  3  -KG     Standing up from a chair using your arms (e.g., wheelchair, bedside chair)?  4  -KG     Climbing 3-5 steps with a railing?  3  -KG     To walk in hospital room?  3  -KG     AM-PAC 6 Clicks Score (PT)  21  -KG     [facility] Name 11/19/19 1312          Functional Assessment    Outcome Measure Options  AM-PAC 6 Clicks Basic Mobility (PT)  -KG       User Key  (r) = Recorded By, (t) = Taken By, (c) = Cosigned By    Initials Name Provider Type    Sherine Dudley Physical Therapist        Physical Therapy Education     Title: PT OT SLP Therapies (Done)     Topic: Physical Therapy (Done)     Point: Mobility training (Done)     Learning Progress Summary           Patient Acceptance, E,TB, VU by MONSE at 11/19/2019  1:13 PM    Acceptance, E, NR by CHUCK at 11/18/2019  2:02 PM    Acceptance, E, NR by MAXINE at 11/16/2019  3:59 PM     Eager, E, VU by SC at 11/15/2019  3:45 PM    Comment:  reviewed benefits of activity                   Point: Home exercise program (Done)     Learning Progress Summary           Patient Acceptance, E,TB, VU by  at 11/19/2019  1:13 PM    Acceptance, E, NR by  at 11/16/2019  3:59 PM    Eager, E, VU by SC at 11/15/2019  3:45 PM    Comment:  reviewed benefits of activity                   Point: Body mechanics (Done)     Learning Progress Summary           Patient Acceptance, E,TB, VU by  at 11/19/2019  1:13 PM    Acceptance, E, NR by  at 11/18/2019  2:02 PM    Acceptance, E, NR by  at 11/16/2019  3:59 PM    Eager, E, VU by SC at 11/15/2019  3:45 PM    Comment:  reviewed benefits of activity                   Point: Precautions (Done)     Learning Progress Summary           Patient Acceptance, E,TB, VU by  at 11/19/2019  1:13 PM    Acceptance, E, NR by  at 11/18/2019  2:02 PM    Acceptance, E, NR by  at 11/16/2019  3:59 PM    Eager, E, VU by SC at 11/15/2019  3:45 PM    Comment:  reviewed benefits of activity                               User Key     Initials Effective Dates Name Provider Type Discipline    SC 06/19/15 -  Tammie Langley, PT Physical Therapist PT     06/19/15 -  Zina Brito, PT Physical Therapist PT     04/02/18 -  Iris León, PT Physical Therapist PT     08/28/19 -  Sherine Christianson Physical Therapist PT              PT Recommendation and Plan     Plan of Care Reviewed With: patient  Outcome Summary: Pt either reached or almost reached all goals.  Indep with bed mobility, able to ambulate up to 300 ft with SBA, some instability R LE no AD.      Time Calculation:   PT Charges     Row Name 11/19/19 1314             Time Calculation    Start Time  0830  -KG      PT Received On  11/19/19  -KG      PT Goal Re-Cert Due Date  11/25/19  -KG         Time Calculation- PT    Total Timed Code Minutes- PT  25 minute(s)  -KG         Timed Charges    01301 - PT Therapeutic Exercise  Minutes  10  -KG      28604 - Gait Training Minutes   15  -KG        User Key  (r) = Recorded By, (t) = Taken By, (c) = Cosigned By    Initials Name Provider Type    Sherine Dudley Physical Therapist        Therapy Charges for Today     Code Description Service Date Service Provider Modifiers Qty    97088315800 HC PT THER PROC EA 15 MIN 11/19/2019 Sherine Christianson GP 1    [id number] HC GAIT TRAINING EA 15 MIN 11/19/2019 Sherine Christianson GP 1          PT G-Codes  Outcome Measure Options: AM-PAC 6 Clicks Basic Mobility (PT)  AM-PAC 6 Clicks Score (PT): 21  AM-PAC 6 Clicks Score (OT): 20    PT Discharge Summary  Reason for Discharge: Discharge from facility  Outcomes Achieved: Patient able to partially acheive established goals    Sherine Christianson  11/19/2019